# Patient Record
Sex: FEMALE | Race: WHITE | Employment: OTHER | URBAN - METROPOLITAN AREA
[De-identification: names, ages, dates, MRNs, and addresses within clinical notes are randomized per-mention and may not be internally consistent; named-entity substitution may affect disease eponyms.]

---

## 2017-04-10 PROBLEM — Z80.3 FAMILY HISTORY OF BREAST CANCER: Status: ACTIVE | Noted: 2017-04-10

## 2017-09-05 ENCOUNTER — HOSPITAL ENCOUNTER (OUTPATIENT)
Dept: WOMENS IMAGING | Age: 57
Discharge: HOME | End: 2017-09-05
Payer: COMMERCIAL

## 2017-09-05 DIAGNOSIS — Z12.39 SCREENING FOR BREAST CANCER: ICD-10-CM

## 2017-09-05 DIAGNOSIS — Z80.3 FAMILY HISTORY OF BREAST CANCER: ICD-10-CM

## 2017-09-05 PROCEDURE — 77063 BREAST TOMOSYNTHESIS BI: CPT

## 2017-12-06 PROBLEM — H20.9 IRITIS OF BOTH EYES: Status: ACTIVE | Noted: 2017-12-06

## 2018-01-16 ENCOUNTER — ANESTHESIA EVENT (OUTPATIENT)
Dept: OPERATING ROOM | Age: 58
End: 2018-01-16
Payer: COMMERCIAL

## 2018-01-17 ENCOUNTER — HOSPITAL ENCOUNTER (OUTPATIENT)
Age: 58
Setting detail: OUTPATIENT SURGERY
Discharge: HOME HEALTH | End: 2018-01-17
Attending: OPHTHALMOLOGY | Admitting: OPHTHALMOLOGY
Payer: COMMERCIAL

## 2018-01-17 ENCOUNTER — ANESTHESIA (OUTPATIENT)
Dept: OPERATING ROOM | Age: 58
End: 2018-01-17
Payer: COMMERCIAL

## 2018-01-17 VITALS
RESPIRATION RATE: 16 BRPM | WEIGHT: 190 LBS | OXYGEN SATURATION: 100 % | TEMPERATURE: 97.9 F | SYSTOLIC BLOOD PRESSURE: 117 MMHG | HEART RATE: 80 BPM | HEIGHT: 64 IN | BODY MASS INDEX: 32.44 KG/M2 | DIASTOLIC BLOOD PRESSURE: 79 MMHG

## 2018-01-17 VITALS — OXYGEN SATURATION: 98 % | SYSTOLIC BLOOD PRESSURE: 124 MMHG | DIASTOLIC BLOOD PRESSURE: 90 MMHG | TEMPERATURE: 96.8 F

## 2018-01-17 PROBLEM — H20.11: Status: ACTIVE | Noted: 2018-01-17

## 2018-01-17 PROBLEM — H35.371 MACULAR PUCKER, RIGHT EYE: Status: ACTIVE | Noted: 2018-01-17

## 2018-01-17 LAB
EKG ATRIAL RATE: 80 BPM
EKG P AXIS: 33 DEGREES
EKG P-R INTERVAL: 162 MS
EKG Q-T INTERVAL: 408 MS
EKG QRS DURATION: 76 MS
EKG QTC CALCULATION (BAZETT): 470 MS
EKG T AXIS: 2 DEGREES
EKG VENTRICULAR RATE: 80 BPM

## 2018-01-17 PROCEDURE — 6360000002 HC RX W HCPCS: Performed by: ANESTHESIOLOGY

## 2018-01-17 PROCEDURE — 3700000001 HC ADD 15 MINUTES (ANESTHESIA): Performed by: OPHTHALMOLOGY

## 2018-01-17 PROCEDURE — 93005 ELECTROCARDIOGRAM TRACING: CPT

## 2018-01-17 PROCEDURE — 3600000004 HC SURGERY LEVEL 4 BASE: Performed by: OPHTHALMOLOGY

## 2018-01-17 PROCEDURE — 7100000010 HC PHASE II RECOVERY - FIRST 15 MIN: Performed by: OPHTHALMOLOGY

## 2018-01-17 PROCEDURE — 87075 CULTR BACTERIA EXCEPT BLOOD: CPT

## 2018-01-17 PROCEDURE — A6257 TRANSPARENT FILM <= 16 SQ IN: HCPCS | Performed by: OPHTHALMOLOGY

## 2018-01-17 PROCEDURE — 2500000003 HC RX 250 WO HCPCS: Performed by: OPHTHALMOLOGY

## 2018-01-17 PROCEDURE — 3700000000 HC ANESTHESIA ATTENDED CARE: Performed by: OPHTHALMOLOGY

## 2018-01-17 PROCEDURE — 3600000014 HC SURGERY LEVEL 4 ADDTL 15MIN: Performed by: OPHTHALMOLOGY

## 2018-01-17 PROCEDURE — 6360000002 HC RX W HCPCS: Performed by: NURSE ANESTHETIST, CERTIFIED REGISTERED

## 2018-01-17 PROCEDURE — 6360000002 HC RX W HCPCS: Performed by: OPHTHALMOLOGY

## 2018-01-17 PROCEDURE — 6370000000 HC RX 637 (ALT 250 FOR IP): Performed by: OPHTHALMOLOGY

## 2018-01-17 PROCEDURE — 87070 CULTURE OTHR SPECIMN AEROBIC: CPT

## 2018-01-17 PROCEDURE — 2500000003 HC RX 250 WO HCPCS: Performed by: NURSE ANESTHETIST, CERTIFIED REGISTERED

## 2018-01-17 PROCEDURE — 7100000011 HC PHASE II RECOVERY - ADDTL 15 MIN: Performed by: OPHTHALMOLOGY

## 2018-01-17 PROCEDURE — 2580000003 HC RX 258: Performed by: ANESTHESIOLOGY

## 2018-01-17 PROCEDURE — 86403 PARTICLE AGGLUT ANTBDY SCRN: CPT

## 2018-01-17 PROCEDURE — 87205 SMEAR GRAM STAIN: CPT

## 2018-01-17 RX ORDER — SODIUM CHLORIDE, SODIUM LACTATE, POTASSIUM CHLORIDE, CALCIUM CHLORIDE 600; 310; 30; 20 MG/100ML; MG/100ML; MG/100ML; MG/100ML
INJECTION, SOLUTION INTRAVENOUS CONTINUOUS
Status: DISCONTINUED | OUTPATIENT
Start: 2018-01-17 | End: 2018-01-17 | Stop reason: HOSPADM

## 2018-01-17 RX ORDER — MIDAZOLAM HYDROCHLORIDE 1 MG/ML
1 INJECTION INTRAMUSCULAR; INTRAVENOUS EVERY 5 MIN PRN
Status: COMPLETED | OUTPATIENT
Start: 2018-01-17 | End: 2018-01-17

## 2018-01-17 RX ORDER — PHENYLEPHRINE HYDROCHLORIDE 100 MG/ML
1 SOLUTION/ DROPS OPHTHALMIC EVERY 5 MIN PRN
Status: COMPLETED | OUTPATIENT
Start: 2018-01-17 | End: 2018-01-17

## 2018-01-17 RX ORDER — ATROPINE SULFATE 10 MG/ML
1 SOLUTION/ DROPS OPHTHALMIC
Status: COMPLETED | OUTPATIENT
Start: 2018-01-17 | End: 2018-01-17

## 2018-01-17 RX ORDER — CEFAZOLIN SODIUM 500 MG/2.2ML
INJECTION, POWDER, FOR SOLUTION INTRAMUSCULAR; INTRAVENOUS PRN
Status: DISCONTINUED | OUTPATIENT
Start: 2018-01-17 | End: 2018-01-17 | Stop reason: HOSPADM

## 2018-01-17 RX ORDER — BUPIVACAINE HYDROCHLORIDE 7.5 MG/ML
INJECTION, SOLUTION EPIDURAL; RETROBULBAR PRN
Status: DISCONTINUED | OUTPATIENT
Start: 2018-01-17 | End: 2018-01-17 | Stop reason: HOSPADM

## 2018-01-17 RX ORDER — CYCLOPENTOLATE HYDROCHLORIDE 10 MG/ML
1 SOLUTION/ DROPS OPHTHALMIC EVERY 5 MIN PRN
Status: COMPLETED | OUTPATIENT
Start: 2018-01-17 | End: 2018-01-17

## 2018-01-17 RX ORDER — OFLOXACIN 3 MG/ML
1 SOLUTION/ DROPS OPHTHALMIC EVERY 5 MIN PRN
Status: COMPLETED | OUTPATIENT
Start: 2018-01-17 | End: 2018-01-17

## 2018-01-17 RX ORDER — LIDOCAINE HYDROCHLORIDE 10 MG/ML
INJECTION, SOLUTION EPIDURAL; INFILTRATION; INTRACAUDAL; PERINEURAL PRN
Status: DISCONTINUED | OUTPATIENT
Start: 2018-01-17 | End: 2018-01-17 | Stop reason: SDUPTHER

## 2018-01-17 RX ORDER — PROPOFOL 10 MG/ML
INJECTION, EMULSION INTRAVENOUS PRN
Status: DISCONTINUED | OUTPATIENT
Start: 2018-01-17 | End: 2018-01-17 | Stop reason: SDUPTHER

## 2018-01-17 RX ORDER — LIDOCAINE HYDROCHLORIDE 20 MG/ML
INJECTION, SOLUTION INFILTRATION; PERINEURAL PRN
Status: DISCONTINUED | OUTPATIENT
Start: 2018-01-17 | End: 2018-01-17 | Stop reason: HOSPADM

## 2018-01-17 RX ORDER — INDOCYANINE GREEN AND WATER 25 MG
KIT INJECTION PRN
Status: DISCONTINUED | OUTPATIENT
Start: 2018-01-17 | End: 2018-01-17 | Stop reason: HOSPADM

## 2018-01-17 RX ADMIN — PROPOFOL 70 MG: 10 INJECTION, EMULSION INTRAVENOUS at 08:45

## 2018-01-17 RX ADMIN — PHENYLEPHRINE HYDROCHLORIDE 1 DROP: 100 SOLUTION/ DROPS OPHTHALMIC at 07:41

## 2018-01-17 RX ADMIN — OFLOXACIN 1 DROP: 3 SOLUTION OPHTHALMIC at 07:41

## 2018-01-17 RX ADMIN — ATROPINE SULFATE 1 DROP: 10 SOLUTION/ DROPS OPHTHALMIC at 07:56

## 2018-01-17 RX ADMIN — PHENYLEPHRINE HYDROCHLORIDE 1 DROP: 100 SOLUTION/ DROPS OPHTHALMIC at 07:34

## 2018-01-17 RX ADMIN — CYCLOPENTOLATE HYDROCHLORIDE 1 DROP: 10 SOLUTION/ DROPS OPHTHALMIC at 07:47

## 2018-01-17 RX ADMIN — MIDAZOLAM HYDROCHLORIDE 2 MG: 1 INJECTION, SOLUTION INTRAMUSCULAR; INTRAVENOUS at 08:45

## 2018-01-17 RX ADMIN — CYCLOPENTOLATE HYDROCHLORIDE 1 DROP: 10 SOLUTION/ DROPS OPHTHALMIC at 07:41

## 2018-01-17 RX ADMIN — SODIUM CHLORIDE, POTASSIUM CHLORIDE, SODIUM LACTATE AND CALCIUM CHLORIDE: 600; 310; 30; 20 INJECTION, SOLUTION INTRAVENOUS at 07:56

## 2018-01-17 RX ADMIN — CYCLOPENTOLATE HYDROCHLORIDE 1 DROP: 10 SOLUTION/ DROPS OPHTHALMIC at 07:34

## 2018-01-17 RX ADMIN — LIDOCAINE HYDROCHLORIDE 50 MG: 10 INJECTION, SOLUTION EPIDURAL; INFILTRATION; INTRACAUDAL; PERINEURAL at 08:45

## 2018-01-17 RX ADMIN — OFLOXACIN 1 DROP: 3 SOLUTION OPHTHALMIC at 07:35

## 2018-01-17 RX ADMIN — MIDAZOLAM HYDROCHLORIDE 1 MG: 1 INJECTION, SOLUTION INTRAMUSCULAR; INTRAVENOUS at 08:25

## 2018-01-17 RX ADMIN — OFLOXACIN 1 DROP: 3 SOLUTION OPHTHALMIC at 07:47

## 2018-01-17 RX ADMIN — PHENYLEPHRINE HYDROCHLORIDE 1 DROP: 100 SOLUTION/ DROPS OPHTHALMIC at 07:47

## 2018-01-17 RX ADMIN — GENTAMICIN, PREDNISOLONE ACETATE 1 DROP: 3; 10 SUSPENSION/ DROPS OPHTHALMIC at 07:56

## 2018-01-17 ASSESSMENT — PULMONARY FUNCTION TESTS
PIF_VALUE: 0
PIF_VALUE: 1
PIF_VALUE: 0
PIF_VALUE: 1
PIF_VALUE: 0

## 2018-01-17 ASSESSMENT — PAIN SCALES - GENERAL
PAINLEVEL_OUTOF10: 0

## 2018-01-17 ASSESSMENT — PAIN DESCRIPTION - ORIENTATION: ORIENTATION: RIGHT

## 2018-01-17 ASSESSMENT — ENCOUNTER SYMPTOMS
STRIDOR: 0
SHORTNESS OF BREATH: 0

## 2018-01-17 ASSESSMENT — PAIN DESCRIPTION - PAIN TYPE: TYPE: SURGICAL PAIN

## 2018-01-17 ASSESSMENT — PAIN DESCRIPTION - LOCATION: LOCATION: EYE

## 2018-01-17 ASSESSMENT — PAIN - FUNCTIONAL ASSESSMENT
PAIN_FUNCTIONAL_ASSESSMENT: 0-10
PAIN_FUNCTIONAL_ASSESSMENT: 0-10

## 2018-01-17 NOTE — H&P
History and Physical    Pt Name: Devendra Dubon  MRN: [id number]  YOB: 1960  Date of evaluation: 1/17/2018  Primary Care Physician: Swapna Gooden MD    SUBJECTIVE:   History of Chief Complaint:    Devendra Dubon is a 61 y.o. female who is scheduled for right vitrectomy. Pt says she is having this done to remove \"an extra membrane\" . She says she sees shadows around things and blurry for the past couple of months. She has had prior cataract removal as well as YAG procedure bilaterally. Allergies  is allergic to pneumococcal vaccines; protonix [pantoprazole]; and sulfa antibiotics. Medications  Prior to Admission medications    Medication Sig Start Date End Date Taking? Authorizing Provider   fluconazole (DIFLUCAN) 150 MG tablet TAKE ONE TABLET NOW AND ONE IN 72 HOURS. 1/15/18  Yes BABAR Yeager   Cetirizine HCl (ZYRTEC PO) Take 1 tablet by mouth nightly   Yes Historical Provider, MD   etodolac (LODINE) 400 MG tablet TAKE 1 TABLET TWICE A DAY 10/5/17  Yes Swapna Gooden MD   montelukast (SINGULAIR) 10 MG tablet TAKE 1 TABLET AT BEDTIME 8/9/17  Yes Swapna Gooden MD   albuterol sulfate HFA (PROAIR HFA) 108 (90 BASE) MCG/ACT inhaler Inhale 2 puffs into the lungs every 6 hours as needed for Wheezing 4/4/17  Yes Sp Fuentes PA-C   omeprazole (PRILOSEC) 20 MG delayed release capsule Take 1 capsule by mouth 2 times daily 12/14/16  Yes Sp Fuentes PA-C   metroNIDAZOLE (METROCREAM) 0.75 % cream Apply topically 2 times daily. Patient taking differently: Apply topically daily Apply topically 2 times daily.  3/16/16  Yes Swapna Gooden MD   Ascorbic Acid (VITAMIN C) 1000 MG tablet Take 1,000 mg by mouth 2 times daily   Yes Historical Provider, MD   zolpidem (AMBIEN) 10 MG tablet Take by mouth nightly as needed for Sleep (1/2 a pill as needed for sleep)   Yes Historical Provider, MD   aspirin 325 MG tablet Take 325 mg by mouth daily   Yes Historical Provider, MD   Cholecalciferol (VITAMIN D3) 2000 UNITS CAPS Take 1 capsule by mouth daily    Yes Historical Provider, MD   Omega-3 Fatty Acids (FISH OIL) 1000 MG CAPS Take 1,000 mg by mouth 2 times daily   Yes Historical Provider, MD   b complex vitamins capsule Take 1 capsule by mouth daily   Yes Historical Provider, MD   fluticasone (FLONASE) 50 MCG/ACT nasal spray 1 spray by Nasal route daily  Patient taking differently: 1 spray by Nasal route daily as needed  4/4/17   Jaya Thayer PA-C     Past Medical History    has a past medical history of Arthritis; Decreased vision of right eye; Dental bridge present; Deviated septum; Dry skin; GERD (gastroesophageal reflux disease); History of chest pain; Sinus infection; Sleep apnea; Snores; Tingling; and Urgency of urination. Past Surgical History   has a past surgical history that includes knee surgery (Right, 2013); Cataract removal (Bilateral); [place] tooth extraction; eye surgery; e-malignant / benign skin lesion excision; and Colonoscopy. Social History   reports that she has never smoked. She has never used smokeless tobacco.   reports that she drinks alcohol. reports that she does not use drugs. 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 OBJECTIVE:   VITALS:  height is 5' 4\" (1.626 m) and weight is 190 lb (86.2 kg). per [address] & orientated x 3, no acute distress. Friendly. SKIN:  Warm and dry, no rashes   HEAD:  Normocephalic, atraumatic   EYES: PERRLA. EOMI    EARS:  Hearing grossly WNL. NOSE:  Nares patent. No rhinorrhea   MOUTH/THROAT:  Benign  NECK:supple, no lymphadenopathy  LUNGS: Respirations even and non-labored. Clear to auscultation bilaterally, no wheezes, rales, or rhonchi.     CARDIOVASCULAR:

## 2018-01-17 NOTE — ANESTHESIA PRE PROCEDURE
fluticasone (FLONASE) 50 MCG/ACT nasal spray 1 spray by Nasal route daily  Patient taking differently: 1 spray by Nasal route daily as needed  4/4/17   Idalmis Mcguire PA-C       Current medications:    Current Facility-Administered Medications   Medication Dose Route Frequency Provider Last Rate Last Dose    lactated ringers infusion   Intravenous Continuous Sebastian Payan  mL/hr at 01/17/18 0756         Allergies: Allergies   Allergen Reactions    Pneumococcal Vaccines Itching     Itchy, red eyes.      Protonix [Pantoprazole] Hives and Itching    Sulfa Antibiotics Itching       Problem List:    Patient Active Problem List   Diagnosis Code    Abdominal tenderness, epigastric R10.816    Abnormal weight gain R63.5    Allergic rhinitis J30.9    Autonomic nervous system disorder G90.9    Compound nevus D22.9    Contact dermatitis L25.9    Hyperlipidemia E78.5    Insomnia G47.00    Limb pain M79.609    Migraine headache G43.909    Myalgia and myositis [id number]    Neoplasm of uncertain behavior of skin D48.5    Perioral dermatitis L71.0    Polyarthritis M13.0    Shoulder pain, right M25.511    Tension type headache G44.209    Visit for screening mammogram Z12.31    Obstructive sleep apnea syndrome G47.33    Family history of breast cancer Z80.3    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       Past Medical History:        Diagnosis Date    Arthritis     Decreased vision of right eye     Dental bridge present     permanent left upper    Deviated septum     Dry skin     GERD (gastroesophageal reflux disease)     History of chest pain     past,stress test negative,poss acid reflux    Sinus infection 01/04/2018    on antibiotics x 10 days,better no cough or fever    Sleep apnea     mild no machine    Snores     Tingling     Urgency of urination     occas       Past Surgical History:        Procedure Laterality Date    CATARACT REMOVAL Bilateral     COLONOSCOPY      EYE SURGERY      EYE SURGERY Bilateral     YAG- right x3, left x2    KNEE SURGERY Right 2013    orif patella    PRE-MALIGNANT / BENIGN SKIN LESION EXCISION      x2    WISDOM TOOTH EXTRACTION         Social History:    Social History   Substance Use Topics    Smoking status: Never Smoker    Smokeless tobacco: Never Used    Alcohol use 0.0 oz/week                                Counseling given: Not Answered      Vital Signs (Current):   Vitals:    01/12/18 0943 01/17/18 0712 01/17/18 0736   BP:   (!) 130/98   Pulse:   83   Resp:   20   Temp:   98.4 °F (36.9 °C)   TempSrc:   Temporal   SpO2:   97%   Weight: 190 lb (86.2 kg) 190 lb (86.2 kg) 190 lb (86.2 kg)   Height: 5' 4\" (1.626 m) 5' 4\" (1.626 m) 5' 4\" (1.626 m)                                              BP Readings from Last 3 Encounters:   01/17/18 (!) 130/98   12/06/17 126/76   04/04/17 120/78       NPO Status: Time of last liquid consumption: 2330                        Time of last solid consumption: 2030                        Date of last liquid consumption: 01/16/18                        Date of last solid food consumption: 01/16/18    BMI:   Wt Readings from Last 3 Encounters:   01/17/18 190 lb (86.2 kg)   12/06/17 192 lb 3.2 oz (87.2 kg)   04/04/17 181 lb 12.8 oz (82.5 kg)     Body mass index is 32.61 kg/m². CBC: No results found for: WBC, RBC, HGB, HCT, MCV, RDW, PLT    CMP: No results found for: NA, K, CL, CO2, BUN, CREATININE, GFRAA, AGRATIO, LABGLOM, GLUCOSE, PROT, CALCIUM, BILITOT, ALKPHOS, AST, ALT    POC Tests: No results for input(s): POCGLU, POCNA, POCK, POCCL, POCBUN, POCHEMO, POCHCT in the last 72 hours.     Coags: No results found for: PROTIME, INR, APTT    HCG (If Applicable): No results found for: PREGTESTUR, PREGSERUM, HCG, HCGQUANT     ABGs: No results found for: PHART, PO2ART, PMP4VJI, JMH7YAY, BEART, [id number]     Type & Screen (If Applicable):  No results found for: LABABO, LABRH    Anesthesia Evaluation   no history of anesthetic complications:

## 2018-01-18 NOTE — OP NOTE
complications. A complete vitrectomy was then carried out to  limits of visualization under wide-field biomicroscopy. ICG dye was  introduced and removed immediately in its entirety to facilitate staining  of the internal limiting membrane. Under high magnification, a rent was  created in the ILM, and the ILM was peeled in its entirety 360 degrees  around the macula with all overlying epiretinal tissue with 23-gauge  membrane peeling forceps without complications. The eye was examined with  360 degrees of scleral depression with no evidence of new retinal tear or  detachment. The infusion was then turned off, and vancomycin 1 mg/0.1 mL  for a total volume of 0.1 mL was introduced with the 30-gauge needle  through the anterior chamber with an attempt being made to bathe the  capsular remnants and the intraocular lens implant and also achieve  intravitreal delivery. The trocars were removed. The wounds were examined  meticulously for leaks, none were noted. The eye was palpated and noted to  be within normal pressure range. Ancef was instilled. Lid speculum was  removed. The eye was cleaned. Drops and ointments were instilled. The  eye was patched and shielded. SPECIMENS:  1. Right eye undiluted vitreous biopsy. 2.  Right eye undiluted AC aqueous tap biopsy. 3.  Right eye capsular biopsy. DISPOSITION:  The patient was transferred to the PACU without  complications. She was given instructions, prescriptions of medications,  and a followup the next day with [facility]JEREMY.     Carrie Negrete    D: 01/17/2018 9:27:41       T: 01/17/2018 13:39:31     [email or web address]  Job#: [id number]     Doc#: [id number]    CC:

## 2018-01-22 LAB
CULTURE: NORMAL
DIRECT EXAM: NORMAL
Lab: NORMAL
Lab: NORMAL
SPECIMEN DESCRIPTION: NORMAL
SPECIMEN DESCRIPTION: NORMAL
STATUS: NORMAL
STATUS: NORMAL

## 2018-01-23 LAB
CULTURE: ABNORMAL
DIRECT EXAM: ABNORMAL
DIRECT EXAM: ABNORMAL
Lab: ABNORMAL
SPECIMEN DESCRIPTION: ABNORMAL
STATUS: ABNORMAL

## 2018-02-13 ENCOUNTER — HOSPITAL ENCOUNTER (OUTPATIENT)
Age: 58
Setting detail: SPECIMEN
Discharge: HOME | End: 2018-02-13
Payer: COMMERCIAL

## 2018-02-15 LAB — SURGICAL PATHOLOGY REPORT: NORMAL

## 2018-10-03 PROBLEM — N89.8 VAGINAL ITCHING: Status: ACTIVE | Noted: 2018-10-03

## 2018-11-17 PROBLEM — M26.609 TMJ (TEMPOROMANDIBULAR JOINT DISORDER): Status: ACTIVE | Noted: 2018-11-17

## 2019-02-04 ENCOUNTER — OFFICE VISIT (OUTPATIENT)
Dept: PRIMARY CARE CLINIC | Age: 59
End: 2019-02-04
Payer: COMMERCIAL

## 2019-02-04 VITALS
HEART RATE: 102 BPM | OXYGEN SATURATION: 98 % | WEIGHT: 190.2 LBS | BODY MASS INDEX: 32.65 KG/M2 | DIASTOLIC BLOOD PRESSURE: 86 MMHG | SYSTOLIC BLOOD PRESSURE: 132 MMHG

## 2019-02-04 DIAGNOSIS — Z12.39 SCREENING FOR BREAST CANCER: ICD-10-CM

## 2019-02-04 DIAGNOSIS — R20.2 PARESTHESIA OF RIGHT ARM: ICD-10-CM

## 2019-02-04 DIAGNOSIS — Z23 NEED FOR VACCINATION: ICD-10-CM

## 2019-02-04 DIAGNOSIS — Z80.3 FAMILY HISTORY OF BREAST CANCER IN MOTHER: ICD-10-CM

## 2019-02-04 DIAGNOSIS — M25.511 ACUTE PAIN OF RIGHT SHOULDER: Primary | ICD-10-CM

## 2019-02-04 PROCEDURE — 90688 IIV4 VACCINE SPLT 0.5 ML IM: CPT | Performed by: PHYSICIAN ASSISTANT

## 2019-02-04 PROCEDURE — 90471 IMMUNIZATION ADMIN: CPT | Performed by: PHYSICIAN ASSISTANT

## 2019-02-04 PROCEDURE — 99213 OFFICE O/P EST LOW 20 MIN: CPT | Performed by: PHYSICIAN ASSISTANT

## 2019-02-04 RX ORDER — CYCLOBENZAPRINE HCL 10 MG
10 TABLET ORAL NIGHTLY PRN
Qty: 30 TABLET | Refills: 0 | Status: SHIPPED | OUTPATIENT
Start: 2019-02-04 | End: 2019-02-14

## 2019-02-04 RX ORDER — ETODOLAC 400 MG/1
TABLET, FILM COATED ORAL
Qty: 180 TABLET | Refills: 1 | Status: SHIPPED | OUTPATIENT
Start: 2019-02-04 | End: 2019-08-03 | Stop reason: SDUPTHER

## 2019-02-04 RX ORDER — MONTELUKAST SODIUM 10 MG/1
10 TABLET ORAL NIGHTLY
COMMUNITY
End: 2020-04-06 | Stop reason: SDUPTHER

## 2019-02-04 RX ORDER — PREDNISONE 20 MG/1
40 TABLET ORAL DAILY
Qty: 10 TABLET | Refills: 0 | Status: SHIPPED | OUTPATIENT
Start: 2019-02-04 | End: 2019-02-09

## 2019-02-08 ENCOUNTER — HOSPITAL ENCOUNTER (OUTPATIENT)
Dept: GENERAL RADIOLOGY | Age: 59
Discharge: HOME | End: 2019-02-10
Payer: COMMERCIAL

## 2019-02-08 ENCOUNTER — HOSPITAL ENCOUNTER (OUTPATIENT)
Dept: MAMMOGRAPHY | Age: 59
Discharge: HOME | End: 2019-02-10
Payer: COMMERCIAL

## 2019-02-08 DIAGNOSIS — Z12.39 SCREENING FOR BREAST CANCER: ICD-10-CM

## 2019-02-08 DIAGNOSIS — M54.10 RADICULOPATHY OF ARM: ICD-10-CM

## 2019-02-08 DIAGNOSIS — M25.511 ACUTE PAIN OF RIGHT SHOULDER: ICD-10-CM

## 2019-02-08 DIAGNOSIS — Z80.3 FAMILY HISTORY OF BREAST CANCER IN MOTHER: ICD-10-CM

## 2019-02-08 PROCEDURE — 77067 SCR MAMMO BI INCL CAD: CPT

## 2019-02-08 PROCEDURE — 73030 X-RAY EXAM OF SHOULDER: CPT

## 2019-05-20 ENCOUNTER — OFFICE VISIT (OUTPATIENT)
Dept: PRIMARY CARE CLINIC | Age: 59
End: 2019-05-20
Payer: COMMERCIAL

## 2019-05-20 VITALS
SYSTOLIC BLOOD PRESSURE: 136 MMHG | WEIGHT: 190.2 LBS | OXYGEN SATURATION: 98 % | HEART RATE: 94 BPM | DIASTOLIC BLOOD PRESSURE: 84 MMHG | BODY MASS INDEX: 32.65 KG/M2

## 2019-05-20 DIAGNOSIS — M79.632 LEFT FOREARM PAIN: Primary | ICD-10-CM

## 2019-05-20 DIAGNOSIS — M25.542 METACARPOPHALANGEAL JOINT PAIN OF LEFT HAND: ICD-10-CM

## 2019-05-20 PROCEDURE — 99213 OFFICE O/P EST LOW 20 MIN: CPT | Performed by: PHYSICIAN ASSISTANT

## 2019-05-20 RX ORDER — CETIRIZINE HYDROCHLORIDE 10 MG/1
10 TABLET ORAL DAILY
COMMUNITY
End: 2022-04-22 | Stop reason: ALTCHOICE

## 2019-05-20 RX ORDER — SODIUM FLUORIDE 6 MG/ML
PASTE, DENTIFRICE DENTAL
COMMUNITY
Start: 2019-05-18 | End: 2021-06-08

## 2019-05-20 ASSESSMENT — PATIENT HEALTH QUESTIONNAIRE - PHQ9
1. LITTLE INTEREST OR PLEASURE IN DOING THINGS: 0
SUM OF ALL RESPONSES TO PHQ9 QUESTIONS 1 & 2: 0
SUM OF ALL RESPONSES TO PHQ QUESTIONS 1-9: 0
SUM OF ALL RESPONSES TO PHQ QUESTIONS 1-9: 0
2. FEELING DOWN, DEPRESSED OR HOPELESS: 0

## 2019-05-20 NOTE — PROGRESS NOTES
[facility]  [address] [id number] [facility]  Dept: Melyssa Dupont is a 60 y.o. female who presents today for her medical conditions/complaints as noted below. Chief Complaint   Patient presents with    Joint Swelling     Pt states lt painful wrist with knuckle swelling. HPI:     HPI   Shoulder pain improved with PT. Right hand dominant. Pain in dorsal left forearm for a couple months. Says it is constant; not worse with movement. Slightly better over the last few weeks. No known injury. Can't think of a repetitive motion she does with her left hand- hasn't done hooking lately. 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 Pulled a lot of weeds and put out mulch about 2 weeks ago. Left Middle finger MCP joint swelling since then- hurts to pull up pants and use hands to push self up. Aches like toothache all the time. Decreased ROM. No n/t in the hand. Was warm previously. Used ice. No personal hx of gout. Rare alcohol. Not much red meat. No personal or family hx of RA. Chronic thumb pain. Taking Lodine daily. Also taking Tylenol almost daily bid the last couple weeks, which also helps.          LDL Calculated (mg/dL)   Date Value   12/07/2017 138       (goal LDL is <100)   No results found for: AST, ALT, BUN  BP Readings from Last 3 Encounters:   05/20/19 136/84   02/04/19 132/86   11/07/18 (!) 132/90          (goal 120/80)    Past Medical History:   Diagnosis Date    Arthritis     Decreased vision of right eye     Dental bridge present     permanent left upper    Deviated septum     Dry skin     GERD (gastroesophageal reflux disease)     History of chest pain     past,stress test negative,poss acid reflux    Sinus infection 01/04/2018    on antibiotics x 10 days,better no cough or fever    Sleep apnea     mild no machine    Snores     Tingling     Urgency of urination     occas Past Surgical History:   Procedure Laterality Date    CATARACT REMOVAL Bilateral     COLONOSCOPY      EYE SURGERY      EYE SURGERY Bilateral     YAG- right x3, left x2    KNEE SURGERY Right 2013    orif patella    [place] RELEAS VITREOUS,SUBRET/CHOROID FLUID Right 1/17/2018    VITRECTOMY 23 GAUGE, membrane peel performed by Maxim Aviles MD at [facility] PRE-MALIG[facility]NT / [address]      x2    VITRECTOMY Right 01/17/2018    WISDOM TOOTH EXTRACTION         Family History   Problem Relation Age of Onset    Cancer Mother         breast    Heart Disease Father     Diabetes Father        Social History     Tobacco Use    Smoking status: Never Smoker    Smokeless tobacco: Never Used   Substance Use Topics    Alcohol use: Yes     Alcohol/week: 0.0 oz      Current Outpatient Medications   Medication Sig Dispense Refill    cetirizine (ZYRTEC) 10 MG tablet Take 10 mg by mouth daily      Elastic Bandages & Supports (WRIST SPLINT) MISC Wear on left wrist daily for 2 weeks 1 each 0    montelukast (SINGULAIR) 10 MG tablet Take 10 mg by mouth nightly      etodolac (LODINE) 400 MG tablet TAKE 1 TABLET TWICE A  tablet 1    Ascorbic Acid (VITAMIN C) 1000 MG tablet Take 1,000 mg by mouth 2 times daily      zolpidem (AMBIEN) 10 MG tablet Take by mouth nightly as needed for Sleep (1/2 a pill as needed for sleep)      aspirin 325 MG tablet Take 325 mg by mouth daily      Omega-3 Fatty Acids (FISH OIL) 1000 MG CAPS Take 1,000 mg by mouth 2 times daily      b complex vitamins capsule Take 1 capsule by mouth daily      PREVIDENT 5000 BOOSTER PLUS 1.1 % PSTE        No current facility-administered medications for this visit. Allergies   Allergen Reactions    Pneumococcal Vaccines Itching     Itchy, red eyes.      Protonix [Pantoprazole] Hives and Itching    Sulfa Antibiotics Itching       Health Maintenance   Topic Date Due    HIV screen  03/20/1974    Shingles Vaccine (1 of 2) 03/20/2009    Diabetes screen  12/07/2020    Breast cancer screen  02/08/2021    Cervical cancer screen  02/22/2021    Lipid screen  12/07/2022    DTaP/Tdap/Td vaccine (3 - Td) 01/13/2023    Colon cancer screen colonoscopy  02/13/2028    Flu vaccine  Completed    Hepatitis C screen  Completed       Subjective:      Review of Systems   Musculoskeletal: Positive for arthralgias (left middle finger MCP joint), joint swelling and myalgias (left forearm). Neurological: Negative for numbness. Objective:     /84   Pulse 94   Wt 190 lb 3.2 oz (86.3 kg)   SpO2 98%   BMI 32.65 kg/m²   Physical Exam   Constitutional: She appears well-developed and well-nourished. No distress. HENT:   Head: Normocephalic and atraumatic. Cardiovascular: Normal rate, regular rhythm and normal heart sounds. Pulmonary/Chest: Effort normal and breath sounds normal.   Musculoskeletal:        Left wrist: She exhibits normal range of motion, no tenderness and no bony tenderness. Arms:       Left hand: She exhibits decreased range of motion (normal flexion, decreased extension of middle finger). She exhibits no tenderness, no bony tenderness, normal capillary refill and no swelling. Decreased strength with middle finger extension    Skin: She is not diaphoretic. Nursing note and vitals reviewed. Assessment:       Diagnosis Orders   1. Left forearm pain  XR RADIUS ULNA LEFT (2 VIEWS)    Elastic Bandages & Supports (WRIST SPLINT) MISC   2. Metacarpophalangeal joint pain of left hand          Plan:    1. Forearm pain- Xray since this has been going on for months. Try neutral wrist splint for 2 weeks to see if it is a tendonitis. 2. Left middle finger MCP joint pain- no redness, warmth, or swelling today. Did not order xray yet since she has no tenderness, but would do xray if it persists. Continue Lodine/Tylenol for pain. Return if symptoms worsen or fail to improve.     Orders Placed This Encounter   Procedures    XR RADIUS ULNA LEFT (2 VIEWS)     Standing Status:   Future     Standing Expiration Date:   5/20/2020     Order Specific Question:   Reason for exam:     Answer:   left dorsal distal forearm pain for about 3 months     Orders Placed This Encounter   Medications    Elastic Bandages & Supports (WRIST SPLINT) MISC     Sig: Wear on left wrist daily for 2 weeks     Dispense:  1 each     Refill:  0       Patient given educationalmaterials - see patient instructions. Discussed use, benefit, and side effectsof prescribed medications. All patient questions answered. Pt voiced understanding. Reviewed health maintenance. Instructed to continue current medications, diet andexercise. Patient agreed with treatment plan. Follow up as directed.      Electronicallysigned by Dennys Herrera PA-C on 5/26/2019 at 8:17 AM

## 2019-07-11 ENCOUNTER — HOSPITAL ENCOUNTER (OUTPATIENT)
Dept: GENERAL RADIOLOGY | Age: 59
Discharge: HOME | End: 2019-07-13
Payer: COMMERCIAL

## 2019-07-11 ENCOUNTER — HOSPITAL ENCOUNTER (OUTPATIENT)
Age: 59
Discharge: HOME | End: 2019-07-13
Payer: COMMERCIAL

## 2019-07-11 DIAGNOSIS — M79.632 LEFT FOREARM PAIN: ICD-10-CM

## 2019-07-11 PROCEDURE — 73090 X-RAY EXAM OF FOREARM: CPT

## 2019-08-03 DIAGNOSIS — R20.2 PARESTHESIA OF RIGHT ARM: ICD-10-CM

## 2019-08-03 DIAGNOSIS — M25.511 ACUTE PAIN OF RIGHT SHOULDER: ICD-10-CM

## 2019-08-05 RX ORDER — ETODOLAC 400 MG/1
TABLET, FILM COATED ORAL
Qty: 180 TABLET | Refills: 1 | Status: SHIPPED | OUTPATIENT
Start: 2019-08-05 | End: 2021-06-08

## 2019-08-23 ENCOUNTER — OFFICE VISIT (OUTPATIENT)
Dept: PRIMARY CARE CLINIC | Age: 59
End: 2019-08-23
Payer: COMMERCIAL

## 2019-08-23 ENCOUNTER — HOSPITAL ENCOUNTER (OUTPATIENT)
Age: 59
Discharge: HOME | End: 2019-08-23
Payer: COMMERCIAL

## 2019-08-23 VITALS
BODY MASS INDEX: 32.66 KG/M2 | HEART RATE: 103 BPM | RESPIRATION RATE: 16 BRPM | WEIGHT: 184.3 LBS | DIASTOLIC BLOOD PRESSURE: 88 MMHG | SYSTOLIC BLOOD PRESSURE: 122 MMHG | HEIGHT: 63 IN | OXYGEN SATURATION: 98 %

## 2019-08-23 DIAGNOSIS — R10.11 RUQ PAIN: Primary | ICD-10-CM

## 2019-08-23 DIAGNOSIS — R11.2 NON-INTRACTABLE VOMITING WITH NAUSEA, UNSPECIFIED VOMITING TYPE: ICD-10-CM

## 2019-08-23 DIAGNOSIS — R10.12 ACUTE LUQ PAIN: ICD-10-CM

## 2019-08-23 DIAGNOSIS — R10.11 RUQ PAIN: ICD-10-CM

## 2019-08-23 LAB
ABSOLUTE EOS #: 0.3 K/UL (ref 0–0.44)
ABSOLUTE IMMATURE GRANULOCYTE: <0.03 K/UL (ref 0–0.3)
ABSOLUTE LYMPH #: 2.61 K/UL (ref 1.1–3.7)
ABSOLUTE MONO #: 0.46 K/UL (ref 0.1–1.2)
ALBUMIN SERPL-MCNC: 4.5 G/DL (ref 3.5–5.2)
ALBUMIN/GLOBULIN RATIO: 1.6 (ref 1–2.5)
ALP BLD-CCNC: 65 U/L (ref 35–104)
ALT SERPL-CCNC: 26 U/L (ref 5–33)
AMYLASE: 39 U/L (ref 28–100)
ANION GAP SERPL CALCULATED.3IONS-SCNC: 14 MMOL/L (ref 9–17)
AST SERPL-CCNC: 20 U/L
BASOPHILS # BLD: 1 % (ref 0–2)
BASOPHILS ABSOLUTE: 0.06 K/UL (ref 0–0.2)
BILIRUB SERPL-MCNC: 0.38 MG/DL (ref 0.3–1.2)
BILIRUBIN DIRECT: 0.11 MG/DL
BILIRUBIN, INDIRECT: 0.27 MG/DL (ref 0–1)
BUN BLDV-MCNC: 15 MG/DL (ref 8–23)
BUN/CREAT BLD: NORMAL (ref 9–20)
CALCIUM SERPL-MCNC: 9.5 MG/DL (ref 8.6–10.4)
CHLORIDE BLD-SCNC: 101 MMOL/L (ref 98–107)
CO2: 27 MMOL/L (ref 20–31)
CREAT SERPL-MCNC: 0.73 MG/DL (ref 0.5–0.9)
DIFFERENTIAL TYPE: ABNORMAL
EOSINOPHILS RELATIVE PERCENT: 4 % (ref 1–4)
GFR AFRICAN AMERICAN: >60 ML/MIN
GFR NON-AFRICAN AMERICAN: >60 ML/MIN
GFR SERPL CREATININE-BSD FRML MDRD: NORMAL ML/MIN/{1.73_M2}
GFR SERPL CREATININE-BSD FRML MDRD: NORMAL ML/MIN/{1.73_M2}
GLOBULIN: NORMAL G/DL (ref 1.5–3.8)
GLUCOSE BLD-MCNC: 84 MG/DL (ref 70–99)
HCT VFR BLD CALC: 42.9 % (ref 36.3–47.1)
HEMOGLOBIN: 13.5 G/DL (ref 11.9–15.1)
IMMATURE GRANULOCYTES: 0 %
LIPASE: 21 U/L (ref 13–60)
LYMPHOCYTES # BLD: 38 % (ref 24–43)
MCH RBC QN AUTO: 28.1 PG (ref 25.2–33.5)
MCHC RBC AUTO-ENTMCNC: 31.5 G/DL (ref 28.4–34.8)
MCV RBC AUTO: 89.2 FL (ref 82.6–102.9)
MONOCYTES # BLD: 7 % (ref 3–12)
NRBC AUTOMATED: 0 PER 100 WBC
PDW BLD-RTO: 14.6 % (ref 11.8–14.4)
PLATELET # BLD: 342 K/UL (ref 138–453)
PLATELET ESTIMATE: ABNORMAL
PMV BLD AUTO: 10 FL (ref 8.1–13.5)
POTASSIUM SERPL-SCNC: 4.2 MMOL/L (ref 3.7–5.3)
RBC # BLD: 4.81 M/UL (ref 3.95–5.11)
RBC # BLD: ABNORMAL 10*6/UL
SEG NEUTROPHILS: 50 % (ref 36–65)
SEGMENTED NEUTROPHILS ABSOLUTE COUNT: 3.36 K/UL (ref 1.5–8.1)
SODIUM BLD-SCNC: 142 MMOL/L (ref 135–144)
TOTAL PROTEIN: 7.4 G/DL (ref 6.4–8.3)
WBC # BLD: 6.8 K/UL (ref 3.5–11.3)
WBC # BLD: ABNORMAL 10*3/UL

## 2019-08-23 PROCEDURE — 36415 COLL VENOUS BLD VENIPUNCTURE: CPT

## 2019-08-23 PROCEDURE — 85025 COMPLETE CBC W/AUTO DIFF WBC: CPT

## 2019-08-23 PROCEDURE — 80048 BASIC METABOLIC PNL TOTAL CA: CPT

## 2019-08-23 PROCEDURE — 80076 HEPATIC FUNCTION PANEL: CPT

## 2019-08-23 PROCEDURE — 83690 ASSAY OF LIPASE: CPT

## 2019-08-23 PROCEDURE — 99213 OFFICE O/P EST LOW 20 MIN: CPT | Performed by: FAMILY MEDICINE

## 2019-08-23 PROCEDURE — 82150 ASSAY OF AMYLASE: CPT

## 2019-08-23 ASSESSMENT — ENCOUNTER SYMPTOMS
SORE THROAT: 0
RHINORRHEA: 0
VOMITING: 1
COUGH: 0
SHORTNESS OF BREATH: 0
WHEEZING: 0
DIARRHEA: 0
EYE REDNESS: 0
ABDOMINAL PAIN: 1
BLOOD IN STOOL: 0
EYE DISCHARGE: 0
NAUSEA: 1

## 2019-08-23 NOTE — PROGRESS NOTES
[address]  [address]  LOWER LEVEL [place]  [address] [address]  Dept: [phone]    Latosha Erickson is a 60 y.o. female who presents today for her medical conditions/complaintsas noted below. Chief Complaint   Patient presents with    Other     Bowel issues       HPI:     HPI  Patient states has been having recurrent vomiting approximately 1 hour after eating. States occurring about twice a month. Normally her bowel movements are on a very regular basis. He has had no melena or hematochezia. States has been having some dry mouth. Denies any change in weight. No mucus in the stool. Otherwise unremarkable.     LDL Calculated (mg/dL)   Date Value   12/07/2017 138       (goal LDL is <100)   No results found for: AST, ALT, BUN  BP Readings from Last 3 Encounters:   08/23/19 122/88   05/20/19 136/84   02/04/19 132/86          (goal 120/80)    Past Medical History:   Diagnosis Date    Arthritis     Decreased vision of right eye     Dental bridge present     permanent left upper    Deviated septum     Dry skin     GERD (gastroesophageal reflux disease)     History of chest pain     past,stress test negative,poss acid reflux    Sinus infection 01/04/2018    on antibiotics x 10 days,better no cough or fever    Sleep apnea     mild no machine    Snores     Tingling     Urgency of urination     occas      Past Surgical History:   Procedure Laterality Date    CATARACT REMOVAL Bilateral     COLONOSCOPY      EYE SURGERY      EYE SURGERY Bilateral     YAG- right x3, left x2    KNEE SURGERY Right 2013    orif patella    [place] RELEAS VITREOUS,SUBRET/CHOROID FLUID Right 1/17/2018    VITRECTOMY 23 GAUGE, membrane peel performed by Niecy Clarke MD at [address] PRE-MALIGNANT / [address]      x2    VITRECTOMY Right 01/17/2018    WISDOM TOOTH EXTRACTION         Family History   Problem Relation Age of Onset    Cancer Mother

## 2019-08-27 DIAGNOSIS — R10.12 ACUTE LUQ PAIN: ICD-10-CM

## 2019-08-27 DIAGNOSIS — R11.2 NON-INTRACTABLE VOMITING WITH NAUSEA, UNSPECIFIED VOMITING TYPE: ICD-10-CM

## 2019-08-27 DIAGNOSIS — R10.11 RUQ PAIN: ICD-10-CM

## 2019-09-13 DIAGNOSIS — R10.816 EPIGASTRIC ABDOMINAL TENDERNESS, REBOUND TENDERNESS PRESENCE NOT SPECIFIED: Primary | ICD-10-CM

## 2019-09-18 ENCOUNTER — OFFICE VISIT (OUTPATIENT)
Dept: PRIMARY CARE CLINIC | Age: 59
End: 2019-09-18
Payer: COMMERCIAL

## 2019-09-18 VITALS
OXYGEN SATURATION: 97 % | DIASTOLIC BLOOD PRESSURE: 82 MMHG | BODY MASS INDEX: 31.72 KG/M2 | HEART RATE: 98 BPM | WEIGHT: 180.8 LBS | SYSTOLIC BLOOD PRESSURE: 132 MMHG

## 2019-09-18 DIAGNOSIS — J40 BRONCHITIS: Primary | ICD-10-CM

## 2019-09-18 PROCEDURE — 99213 OFFICE O/P EST LOW 20 MIN: CPT | Performed by: FAMILY MEDICINE

## 2019-09-18 RX ORDER — FLUCONAZOLE 150 MG/1
150 TABLET ORAL ONCE
Qty: 1 TABLET | Refills: 0 | Status: SHIPPED | OUTPATIENT
Start: 2019-09-18 | End: 2019-09-18

## 2019-09-18 RX ORDER — AZITHROMYCIN 250 MG/1
250 TABLET, FILM COATED ORAL SEE ADMIN INSTRUCTIONS
Qty: 6 TABLET | Refills: 0 | Status: SHIPPED | OUTPATIENT
Start: 2019-09-18 | End: 2019-09-23

## 2019-09-18 RX ORDER — GUAIFENESIN AND PSEUDOEPHEDRINE HCL 1200; 120 MG/1; MG/1
1 TABLET, EXTENDED RELEASE ORAL 2 TIMES DAILY
Qty: 20 TABLET | Refills: 0 | Status: SHIPPED | OUTPATIENT
Start: 2019-09-18 | End: 2019-10-01 | Stop reason: SDUPTHER

## 2019-09-18 ASSESSMENT — ENCOUNTER SYMPTOMS
DIARRHEA: 0
SHORTNESS OF BREATH: 1
COUGH: 1
VOMITING: 0
ABDOMINAL PAIN: 0
SORE THROAT: 0
EYE DISCHARGE: 0
EYE REDNESS: 0
NAUSEA: 0
WHEEZING: 0
RHINORRHEA: 0

## 2019-09-18 NOTE — PROGRESS NOTES
[facility]  [address] [id number]  Dept: Melyssa Dupont is a 60 y.o. female who presents today for her medical conditions/complaintsas noted below. Chief Complaint   Patient presents with    Head Congestion    Cough    Chest Congestion       HPI:     HPI  Pt states started yesterday with sore throat, headache, cough, pain in shoulders. Have sob. Some chills. No fever. States feels like when she had pneumonia last spring. No wheeze. Tried claritin.      LDL Calculated (mg/dL)   Date Value   12/07/2017 138       (goal LDL is <100)   AST (U/L)   Date Value   08/23/2019 20     ALT (U/L)   Date Value   08/23/2019 26     BUN (mg/dL)   Date Value   08/23/2019 15     BP Readings from Last 3 Encounters:   09/18/19 132/82   08/23/19 122/88   05/20/19 136/84          (goal 120/80)    Past Medical History:   Diagnosis Date    Arthritis     Decreased vision of right eye     Dental bridge present     permanent left upper    Deviated septum     Dry skin     GERD (gastroesophageal reflux disease)     History of chest pain     past,stress test negative,poss acid reflux    Sinus infection 01/04/2018    on antibiotics x 10 days,better no cough or fever    Sleep apnea     mild no machine    Snores     Tingling     Urgency of urination     occas      Past Surgical History:   Procedure Laterality Date    CATARACT REMOVAL Bilateral     COLONOSCOPY      EYE SURGERY      EYE SURGERY Bilateral     YAG- right x3, left x2    KNEE SURGERY Right 2013    orif patella    [place] RELEAS VITREOUS,SUBRET/CHOROID FLUID Right 1/17/2018    VITRECTOMY 23 GAUGE, membrane peel performed by Tiny Iraheta MD at [facility] PRE-MALIGNANT / [address] [place] [address] Avenue      x2    VITRECTOMY Right 01/17/2018    WISDOM TOOTH EXTRACTION         Family History   Problem Relation Age of Onset    Cancer Mother         breast    Heart Disease Father    [employer]

## 2019-09-23 ENCOUNTER — APPOINTMENT (OUTPATIENT)
Dept: NUCLEAR MEDICINE | Age: 59
End: 2019-09-23
Payer: COMMERCIAL

## 2019-10-01 RX ORDER — GUAIFENESIN AND PSEUDOEPHEDRINE HCL 1200; 120 MG/1; MG/1
1 TABLET, EXTENDED RELEASE ORAL 2 TIMES DAILY
Qty: 20 TABLET | Refills: 0 | Status: SHIPPED | OUTPATIENT
Start: 2019-10-01 | End: 2019-12-11

## 2019-10-09 ENCOUNTER — TELEPHONE (OUTPATIENT)
Dept: PRIMARY CARE CLINIC | Age: 59
End: 2019-10-09

## 2019-10-09 DIAGNOSIS — J40 BRONCHITIS: Primary | ICD-10-CM

## 2019-10-10 ENCOUNTER — HOSPITAL ENCOUNTER (OUTPATIENT)
Dept: GENERAL RADIOLOGY | Age: 59
Discharge: HOME | End: 2019-10-12
Payer: COMMERCIAL

## 2019-10-10 ENCOUNTER — HOSPITAL ENCOUNTER (OUTPATIENT)
Age: 59
Discharge: HOME | End: 2019-10-12
Payer: COMMERCIAL

## 2019-10-10 ENCOUNTER — HOSPITAL ENCOUNTER (OUTPATIENT)
Age: 59
Discharge: HOME | End: 2019-10-10
Payer: COMMERCIAL

## 2019-10-10 DIAGNOSIS — J40 BRONCHITIS: ICD-10-CM

## 2019-10-10 LAB
-: NORMAL
REASON FOR REJECTION: NORMAL
ZZ NTE CLEAN UP: ORDERED TEST: NORMAL
ZZ NTE WITH NAME CLEAN UP: SPECIMEN SOURCE: NORMAL

## 2019-10-10 PROCEDURE — 86738 MYCOPLASMA ANTIBODY: CPT

## 2019-10-10 PROCEDURE — 71046 X-RAY EXAM CHEST 2 VIEWS: CPT

## 2019-10-10 PROCEDURE — 36415 COLL VENOUS BLD VENIPUNCTURE: CPT

## 2019-10-11 ENCOUNTER — HOSPITAL ENCOUNTER (OUTPATIENT)
Age: 59
Setting detail: SPECIMEN
Discharge: HOME | End: 2019-10-11
Payer: COMMERCIAL

## 2019-10-11 LAB
ABSOLUTE EOS #: 0.2 K/UL (ref 0–0.44)
ABSOLUTE IMMATURE GRANULOCYTE: <0.03 K/UL (ref 0–0.3)
ABSOLUTE LYMPH #: 2 K/UL (ref 1.1–3.7)
ABSOLUTE MONO #: 0.29 K/UL (ref 0.1–1.2)
BASOPHILS # BLD: 1 % (ref 0–2)
BASOPHILS ABSOLUTE: 0.04 K/UL (ref 0–0.2)
DIFFERENTIAL TYPE: NORMAL
EOSINOPHILS RELATIVE PERCENT: 4 % (ref 1–4)
HCT VFR BLD CALC: 42.8 % (ref 36.3–47.1)
HEMOGLOBIN: 13.7 G/DL (ref 11.9–15.1)
IMMATURE GRANULOCYTES: 0 %
LYMPHOCYTES # BLD: 35 % (ref 24–43)
MCH RBC QN AUTO: 28.4 PG (ref 25.2–33.5)
MCHC RBC AUTO-ENTMCNC: 32 G/DL (ref 28.4–34.8)
MCV RBC AUTO: 88.8 FL (ref 82.6–102.9)
MONOCYTES # BLD: 5 % (ref 3–12)
MYCOPLASMA PNEUMONIAE IGM: 0.27
NRBC AUTOMATED: 0 PER 100 WBC
PDW BLD-RTO: 14.3 % (ref 11.8–14.4)
PLATELET # BLD: 383 K/UL (ref 138–453)
PLATELET ESTIMATE: NORMAL
PMV BLD AUTO: 10.3 FL (ref 8.1–13.5)
RBC # BLD: 4.82 M/UL (ref 3.95–5.11)
RBC # BLD: NORMAL 10*6/UL
SEG NEUTROPHILS: 55 % (ref 36–65)
SEGMENTED NEUTROPHILS ABSOLUTE COUNT: 3.15 K/UL (ref 1.5–8.1)
WBC # BLD: 5.7 K/UL (ref 3.5–11.3)
WBC # BLD: NORMAL 10*3/UL

## 2019-10-11 PROCEDURE — 85025 COMPLETE CBC W/AUTO DIFF WBC: CPT

## 2019-10-24 ENCOUNTER — OFFICE VISIT (OUTPATIENT)
Dept: PRIMARY CARE CLINIC | Age: 59
End: 2019-10-24
Payer: COMMERCIAL

## 2019-10-24 VITALS
OXYGEN SATURATION: 98 % | BODY MASS INDEX: 31.62 KG/M2 | SYSTOLIC BLOOD PRESSURE: 136 MMHG | TEMPERATURE: 98.3 F | WEIGHT: 180.2 LBS | DIASTOLIC BLOOD PRESSURE: 88 MMHG | HEART RATE: 91 BPM

## 2019-10-24 DIAGNOSIS — J20.9 ACUTE BRONCHITIS, UNSPECIFIED ORGANISM: Primary | ICD-10-CM

## 2019-10-24 DIAGNOSIS — Z71.1 PERSON WITH FEARED COMPLAINT, NO DIAGNOSIS MADE: ICD-10-CM

## 2019-10-24 PROCEDURE — 99213 OFFICE O/P EST LOW 20 MIN: CPT | Performed by: PHYSICIAN ASSISTANT

## 2019-10-24 RX ORDER — OMEPRAZOLE 20 MG/1
20 CAPSULE, DELAYED RELEASE ORAL DAILY
Qty: 90 CAPSULE | Refills: 1 | Status: SHIPPED | OUTPATIENT
Start: 2019-10-24 | End: 2020-04-06

## 2019-10-24 RX ORDER — DOXYCYCLINE HYCLATE 100 MG/1
100 CAPSULE ORAL 2 TIMES DAILY
Qty: 20 CAPSULE | Refills: 0 | Status: SHIPPED | OUTPATIENT
Start: 2019-10-24 | End: 2019-11-03

## 2019-10-24 RX ORDER — PREDNISONE 20 MG/1
40 TABLET ORAL DAILY
Qty: 10 TABLET | Refills: 0 | Status: SHIPPED | OUTPATIENT
Start: 2019-10-24 | End: 2019-10-29

## 2019-10-24 RX ORDER — BENZONATATE 100 MG/1
100 CAPSULE ORAL 3 TIMES DAILY PRN
Qty: 30 CAPSULE | Refills: 0 | Status: SHIPPED | OUTPATIENT
Start: 2019-10-24 | End: 2020-11-12

## 2019-10-24 RX ORDER — ALBUTEROL SULFATE 2.5 MG/3ML
2.5 SOLUTION RESPIRATORY (INHALATION) EVERY 6 HOURS PRN
Qty: 60 VIAL | Refills: 1 | Status: SHIPPED | OUTPATIENT
Start: 2019-10-24 | End: 2020-11-12

## 2019-10-24 ASSESSMENT — ENCOUNTER SYMPTOMS
COUGH: 1
CHEST TIGHTNESS: 1
WHEEZING: 1
SHORTNESS OF BREATH: 1

## 2019-10-31 ASSESSMENT — ENCOUNTER SYMPTOMS: SORE THROAT: 0

## 2019-11-11 ENCOUNTER — HOSPITAL ENCOUNTER (OUTPATIENT)
Dept: NUCLEAR MEDICINE | Age: 59
Discharge: HOME | End: 2019-11-13
Payer: COMMERCIAL

## 2019-11-11 VITALS — HEIGHT: 64 IN | WEIGHT: 177 LBS | BODY MASS INDEX: 30.22 KG/M2

## 2019-11-11 DIAGNOSIS — R10.816 EPIGASTRIC ABDOMINAL TENDERNESS, REBOUND TENDERNESS PRESENCE NOT SPECIFIED: ICD-10-CM

## 2019-11-11 PROCEDURE — 3430000000 HC RX DIAGNOSTIC RADIOPHARMACEUTICAL: Performed by: FAMILY MEDICINE

## 2019-11-11 PROCEDURE — A9537 TC99M MEBROFENIN: HCPCS | Performed by: FAMILY MEDICINE

## 2019-11-11 PROCEDURE — 78227 HEPATOBIL SYST IMAGE W/DRUG: CPT

## 2019-11-11 PROCEDURE — 2580000003 HC RX 258: Performed by: FAMILY MEDICINE

## 2019-11-11 RX ORDER — SODIUM CHLORIDE 0.9 % (FLUSH) 0.9 %
10 SYRINGE (ML) INJECTION PRN
Status: DISCONTINUED | OUTPATIENT
Start: 2019-11-11 | End: 2019-11-14 | Stop reason: HOSPADM

## 2019-11-11 RX ADMIN — Medication 5.5 MILLICURIE: at 10:40

## 2019-11-11 RX ADMIN — Medication 10 ML: at 10:40

## 2019-11-25 ENCOUNTER — TELEPHONE (OUTPATIENT)
Dept: PRIMARY CARE CLINIC | Age: 59
End: 2019-11-25

## 2019-11-25 RX ORDER — FLUCONAZOLE 150 MG/1
150 TABLET ORAL
Qty: 2 TABLET | Refills: 0 | Status: SHIPPED | OUTPATIENT
Start: 2019-11-25 | End: 2019-11-27

## 2019-12-11 ENCOUNTER — OFFICE VISIT (OUTPATIENT)
Dept: PRIMARY CARE CLINIC | Age: 59
End: 2019-12-11
Payer: COMMERCIAL

## 2019-12-11 VITALS
OXYGEN SATURATION: 98 % | WEIGHT: 182.6 LBS | HEART RATE: 87 BPM | DIASTOLIC BLOOD PRESSURE: 80 MMHG | SYSTOLIC BLOOD PRESSURE: 122 MMHG | TEMPERATURE: 98.5 F | BODY MASS INDEX: 31.34 KG/M2

## 2019-12-11 DIAGNOSIS — Z23 NEED FOR VACCINATION: ICD-10-CM
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
## 2019-12-11 DIAGNOSIS — R68.2 DRY MOUTH: ICD-10-CM

## 2019-12-11 PROCEDURE — 99213 OFFICE O/P EST LOW 20 MIN: CPT | Performed by: PHYSICIAN ASSISTANT

## 2019-12-11 PROCEDURE — 90471 IMMUNIZATION ADMIN: CPT | Performed by: PHYSICIAN ASSISTANT

## 2019-12-11 PROCEDURE — 90686 IIV4 VACC NO PRSV 0.5 ML IM: CPT | Performed by: PHYSICIAN ASSISTANT

## 2019-12-11 RX ORDER — BUPROPION HYDROCHLORIDE 150 MG/1
150 TABLET, EXTENDED RELEASE ORAL 2 TIMES DAILY
COMMUNITY
End: 2020-11-12

## 2020-02-03 ENCOUNTER — OFFICE VISIT (OUTPATIENT)
Dept: PRIMARY CARE CLINIC | Age: 60
End: 2020-02-03
Payer: COMMERCIAL

## 2020-02-03 ENCOUNTER — TELEPHONE (OUTPATIENT)
Dept: PRIMARY CARE CLINIC | Age: 60
End: 2020-02-03

## 2020-02-03 VITALS
HEIGHT: 64 IN | WEIGHT: 182.8 LBS | TEMPERATURE: 98.7 F | BODY MASS INDEX: 31.21 KG/M2 | OXYGEN SATURATION: 97 % | HEART RATE: 92 BPM | SYSTOLIC BLOOD PRESSURE: 124 MMHG | DIASTOLIC BLOOD PRESSURE: 70 MMHG

## 2020-02-03 PROBLEM — Z91.09 ENVIRONMENTAL ALLERGIES: Status: ACTIVE | Noted: 2020-02-03

## 2020-02-03 PROCEDURE — 99213 OFFICE O/P EST LOW 20 MIN: CPT | Performed by: NURSE PRACTITIONER

## 2020-02-03 RX ORDER — AMOXICILLIN AND CLAVULANATE POTASSIUM 875; 125 MG/1; MG/1
1 TABLET, FILM COATED ORAL 2 TIMES DAILY
Qty: 20 TABLET | Refills: 0 | Status: SHIPPED | OUTPATIENT
Start: 2020-02-03 | End: 2020-02-13

## 2020-02-03 RX ORDER — FLUCONAZOLE 150 MG/1
150 TABLET ORAL WEEKLY
Qty: 2 TABLET | Refills: 0 | Status: SHIPPED | OUTPATIENT
Start: 2020-02-03 | End: 2020-11-12

## 2020-02-03 ASSESSMENT — ENCOUNTER SYMPTOMS
COUGH: 1
HOARSE VOICE: 1
SINUS PAIN: 1
EYE PAIN: 0
NAUSEA: 0
DIARRHEA: 0
EYE REDNESS: 0
SHORTNESS OF BREATH: 0
SORE THROAT: 1
SINUS PRESSURE: 1

## 2020-02-03 ASSESSMENT — PATIENT HEALTH QUESTIONNAIRE - PHQ9
2. FEELING DOWN, DEPRESSED OR HOPELESS: 0
SUM OF ALL RESPONSES TO PHQ QUESTIONS 1-9: 0
1. LITTLE INTEREST OR PLEASURE IN DOING THINGS: 0
SUM OF ALL RESPONSES TO PHQ QUESTIONS 1-9: 0
SUM OF ALL RESPONSES TO PHQ9 QUESTIONS 1 & 2: 0

## 2020-02-03 NOTE — PROGRESS NOTES
VITRECTOMY 23 GAUGE, membrane peel performed by Chin Horne MD at [facility] / [address]      x2    VITRECTOMY Right 01/17/2018    WISDOM TOOTH EXTRACTION         Family History   Problem Relation Age of Onset    Cancer Mother         breast    Heart Disease Father     Diabetes Father        Social History     Tobacco Use    Smoking status: Never Smoker    Smokeless tobacco: Never Used   Substance Use Topics    Alcohol use:  Yes     Alcohol/week: 0.0 standard drinks      Current Outpatient Medications   Medication Sig Dispense Refill    amoxicillin-clavulanate (AUGMENTIN) 875-125 MG per tablet Take 1 tablet by mouth 2 times daily for 10 days 20 tablet 0    fluconazole (DIFLUCAN) 150 MG tablet Take 1 tablet by mouth once a week 2 tablet 0    buPROPion (WELLBUTRIN SR) 150 MG extended release tablet Take 150 mg by mouth 2 times daily      omeprazole (PRILOSEC) 20 MG delayed release capsule Take 1 capsule by mouth Daily 90 capsule 1    albuterol (PROVENTIL) (2.5 MG/3ML) 0.083% nebulizer solution Take 3 mLs by nebulization every 6 hours as needed for Wheezing or Shortness of Breath 60 vial 1    albuterol sulfate (PROAIR RESPICLICK) 516 (90 Base) MCG/ACT aerosol powder inhalation Inhale 2 puffs into the lungs every 6 hours as needed for Wheezing or Shortness of Breath 1 Inhaler 1    etodolac (LODINE) 400 MG tablet TAKE 1 TABLET TWICE A  tablet 1    cetirizine (ZYRTEC) 10 MG tablet Take 10 mg by mouth daily      PREVIDENT 5000 BOOSTER PLUS 1.1 % PSTE       Elastic Bandages & Supports (WRIST SPLINT) MISC Wear on left wrist daily for 2 weeks 1 each 0    montelukast (SINGULAIR) 10 MG tablet Take 10 mg by mouth nightly      Ascorbic Acid (VITAMIN C) 1000 MG tablet Take 1,000 mg by mouth 2 times daily      zolpidem (AMBIEN) 10 MG tablet Take by mouth nightly as needed for Sleep (1/2 a pill as needed for sleep)      aspirin 325 MG tablet Take 325 mg by mouth daily Physical Exam  Constitutional:       Appearance: She is obese. HENT:      Head: Normocephalic and atraumatic. Right Ear: Tympanic membrane, ear canal and external ear normal.      Left Ear: Tympanic membrane, ear canal and external ear normal.      Nose: Mucosal edema and congestion present. Right Sinus: Maxillary sinus tenderness present. No frontal sinus tenderness. Left Sinus: Maxillary sinus tenderness present. No frontal sinus tenderness. Mouth/Throat:      Mouth: Mucous membranes are moist.      Pharynx: Oropharynx is clear. Eyes:      Conjunctiva/sclera: Conjunctivae normal.      Pupils: Pupils are equal, round, and reactive to light. Neck:      Musculoskeletal: Normal range of motion and neck supple. Vascular: No carotid bruit. Cardiovascular:      Rate and Rhythm: Normal rate and regular rhythm. Heart sounds: Normal heart sounds. Pulmonary:      Effort: Pulmonary effort is normal.      Breath sounds: Normal breath sounds. Abdominal:      General: Bowel sounds are normal.      Palpations: Abdomen is soft. Skin:     General: Skin is warm and dry. Capillary Refill: Capillary refill takes less than 2 seconds. Neurological:      Mental Status: She is alert and oriented to person, place, and time. Cranial Nerves: No cranial nerve deficit. Psychiatric:         Mood and Affect: Mood normal.         Thought Content: Thought content normal.         Judgment: Judgment normal.         Assessment:       Diagnosis Orders   1. Antibiotic-induced yeast infection  fluconazole (DIFLUCAN) 150 MG tablet   2. Suppurative otitis media of right ear, unspecified chronicity  amoxicillin-clavulanate (AUGMENTIN) 875-125 MG per tablet        Plan:    amoxicillin-clavulanate  Fluconazole  Benzonatate     Return if symptoms worsen or fail to improve. No orders of the defined types were placed in this encounter.     Orders Placed This Encounter   Medications    amoxicillin-clavulanate (AUGMENTIN) 875-125 MG per tablet     Sig: Take 1 tablet by mouth 2 times daily for 10 days     Dispense:  20 tablet     Refill:  0    fluconazole (DIFLUCAN) 150 MG tablet     Sig: Take 1 tablet by mouth once a week     Dispense:  2 tablet     Refill:  0       Patient given educationalmaterials - see patient instructions. Discussed use, benefit, and side effectsof prescribed medications. All patient questions answered. Pt voiced understanding. Reviewed health maintenance. Instructed to continue current medications, diet andexercise. Patient agreed with treatment plan. Follow up as directed.      Electronicallysigned by COLTON Jimenez CNP on 2/3/2020 at 11:51 AM

## 2020-02-03 NOTE — PATIENT INSTRUCTIONS
Patient Education        Saline Nasal Washes: Care Instructions  Your Care Instructions  Saline nasal washes help keep the nasal passages open by washing out thick or dried mucus. This simple remedy can help relieve symptoms of allergies, sinusitis, and colds. It also can make the nose feel more comfortable by keeping the mucous membranes moist. You may notice a little burning sensation in your nose the first few times you use the solution, but this usually gets better in a few days. Follow-up care is a key part of your treatment and safety. Be sure to make and go to all appointments, and call your doctor if you are having problems. It's also a good idea to know your test results and keep a list of the medicines you take. How can you care for yourself at home? · You can buy premixed saline solution in a squeeze bottle or other sinus rinse products at a drugstore. Read and follow the instructions on the label. · You also can make your own saline solution by adding 1 teaspoon of salt and 1 teaspoon of baking soda to 2 cups of distilled water. · If you use a homemade solution, pour a small amount into a clean bowl. Using a rubber bulb syringe, squeeze the syringe and place the tip in the salt water. Pull a small amount of the salt water into the syringe by relaxing your hand. · Sit down with your head tilted slightly back. Do not lie down. Put the tip of the bulb syringe or the squeeze bottle a little way into one of your nostrils. Gently drip or squirt a few drops into the nostril. Repeat with the other nostril. Some sneezing and gagging are normal at first.  · Gently blow your nose. · Wipe the syringe or bottle tip clean after each use. · Repeat this 2 or 3 times a day. · Use nasal washes gently if you have nosebleeds often. When should you call for help?   Watch closely for changes in your health, and be sure to contact your doctor if:    · You often get nosebleeds.     · You have problems doing the nasal washes. Where can you learn more? Go to [email or web address]. org and sign in to your [employer] account. Enter [phone] in the [facility] box to learn more about \"Saline Nasal Washes: Care Instructions. \"     If you do not have an account, please click on the \"Sign Up Now\" link. Current as of: July 28, 2019  Content Version: 12.3  © 8054-3937 [employer]. Care instructions adapted under license by [facility] ([facility]). If you have questions about a medical condition or this instruction, always ask your healthcare professional. [address] any warranty or liability for your use of this information.

## 2020-04-06 RX ORDER — MONTELUKAST SODIUM 10 MG/1
10 TABLET ORAL NIGHTLY
Qty: 90 TABLET | Refills: 1 | Status: SHIPPED | OUTPATIENT
Start: 2020-04-06 | End: 2020-10-05

## 2020-04-06 RX ORDER — OMEPRAZOLE 20 MG/1
CAPSULE, DELAYED RELEASE ORAL
Qty: 90 CAPSULE | Refills: 3 | Status: SHIPPED | OUTPATIENT
Start: 2020-04-06 | End: 2021-06-22

## 2020-04-06 RX ORDER — BUPROPION HCL 150 MG
TABLET,SUSTAINED-RELEASE 12 HR ORAL
Qty: 180 TABLET | Refills: 1 | Status: SHIPPED | OUTPATIENT
Start: 2020-04-06 | End: 2020-11-12

## 2020-06-29 ENCOUNTER — TELEPHONE (OUTPATIENT)
Dept: PRIMARY CARE CLINIC | Age: 60
End: 2020-06-29

## 2020-06-29 RX ORDER — FLUCONAZOLE 150 MG/1
150 TABLET ORAL DAILY
Qty: 2 TABLET | Refills: 0 | Status: SHIPPED | OUTPATIENT
Start: 2020-06-29 | End: 2020-11-12

## 2020-09-28 ENCOUNTER — NURSE TRIAGE (OUTPATIENT)
Dept: OTHER | Facility: CLINIC | Age: 60
End: 2020-09-28

## 2020-10-01 ENCOUNTER — OFFICE VISIT (OUTPATIENT)
Dept: PRIMARY CARE CLINIC | Age: 60
End: 2020-10-01
Payer: COMMERCIAL

## 2020-10-01 VITALS
WEIGHT: 184.6 LBS | HEART RATE: 97 BPM | BODY MASS INDEX: 31.51 KG/M2 | TEMPERATURE: 98.2 F | SYSTOLIC BLOOD PRESSURE: 148 MMHG | DIASTOLIC BLOOD PRESSURE: 100 MMHG | HEIGHT: 64 IN | OXYGEN SATURATION: 97 %

## 2020-10-01 PROCEDURE — 99214 OFFICE O/P EST MOD 30 MIN: CPT | Performed by: FAMILY MEDICINE

## 2020-10-01 PROCEDURE — 90471 IMMUNIZATION ADMIN: CPT | Performed by: FAMILY MEDICINE

## 2020-10-01 PROCEDURE — 90686 IIV4 VACC NO PRSV 0.5 ML IM: CPT | Performed by: FAMILY MEDICINE

## 2020-10-01 RX ORDER — AMOXICILLIN AND CLAVULANATE POTASSIUM 875; 125 MG/1; MG/1
1 TABLET, FILM COATED ORAL 2 TIMES DAILY
Qty: 20 TABLET | Refills: 0 | Status: SHIPPED | OUTPATIENT
Start: 2020-10-01 | End: 2020-10-11

## 2020-10-01 RX ORDER — FLUCONAZOLE 150 MG/1
150 TABLET ORAL WEEKLY
Qty: 2 TABLET | Refills: 0 | Status: SHIPPED | OUTPATIENT
Start: 2020-10-01 | End: 2020-11-12

## 2020-10-01 ASSESSMENT — PATIENT HEALTH QUESTIONNAIRE - PHQ9
1. LITTLE INTEREST OR PLEASURE IN DOING THINGS: 0
2. FEELING DOWN, DEPRESSED OR HOPELESS: 0
SUM OF ALL RESPONSES TO PHQ9 QUESTIONS 1 & 2: 0
SUM OF ALL RESPONSES TO PHQ QUESTIONS 1-9: 0
SUM OF ALL RESPONSES TO PHQ QUESTIONS 1-9: 0

## 2020-10-01 ASSESSMENT — ENCOUNTER SYMPTOMS
SHORTNESS OF BREATH: 0
EYE DISCHARGE: 0
NAUSEA: 0
ABDOMINAL PAIN: 0
EYE REDNESS: 0
DIARRHEA: 0
COUGH: 0
WHEEZING: 0
RHINORRHEA: 0
SORE THROAT: 0
VOMITING: 0

## 2020-10-01 NOTE — PROGRESS NOTES
[facility] PRIMARY CARE  [address] [facility] [place] [address]  Dept: Melyssa Dupont is a 63 y.o. female who presents today for her medical conditions/complaintsas noted below. Chief Complaint   Patient presents with    Other     Pain right side of throat    Flu Vaccine       HPI:     HPI  Pt with pain on right side of throat. States started around June. No mass or swelling. States feels \"thick\". Pain worse in am.  No smoking. Patient states he has been having persistent headaches as well.     LDL Calculated (mg/dL)   Date Value   12/07/2017 138       (goal LDL is <100)   AST (U/L)   Date Value   08/23/2019 20     ALT (U/L)   Date Value   08/23/2019 26     BUN (mg/dL)   Date Value   08/23/2019 15     BP Readings from Last 3 Encounters:   10/01/20 (!) 148/100   02/03/20 124/70   12/11/19 122/80          (goal 120/80)    Past Medical History:   Diagnosis Date    Arthritis     Decreased vision of right eye     Dental bridge present     permanent left upper    Deviated septum     Dry skin     GERD (gastroesophageal reflux disease)     History of chest pain     past,stress test negative,poss acid reflux    Sinus infection 01/04/2018    on antibiotics x 10 days,better no cough or fever    Sleep apnea     mild no machine    Snores     Tingling     Urgency of urination     occas      Past Surgical History:   Procedure Laterality Date    CATARACT REMOVAL Bilateral     COLONOSCOPY      EYE SURGERY      EYE SURGERY Bilateral     YAG- right x3, left x2    KNEE SURGERY Right 2013    orif patella    [place] RELEAS VITREOUS,SUBRET/CHOROID FLUID Right 1/17/2018    VITRECTOMY 23 GAUGE, membrane peel performed by Sue Monte MD at [address] [facility] / [address]      x2    VITRECTOMY Right 01/17/2018    WISDOM TOOTH EXTRACTION         Family History   Problem Relation Age of Onset    Cancer Mother         breast    Heart Disease Father     Diabetes Father        Social History     Tobacco Use    Smoking status: Never Smoker    Smokeless tobacco: Never Used   Substance Use Topics    Alcohol use:  Yes     Alcohol/week: 0.0 standard drinks      Current Outpatient Medications   Medication Sig Dispense Refill    amoxicillin-clavulanate (AUGMENTIN) 875-125 MG per tablet Take 1 tablet by mouth 2 times daily for 10 days 20 tablet 0    fluconazole (DIFLUCAN) 150 MG tablet Take 1 tablet by mouth once a week 2 tablet 0    fluconazole (DIFLUCAN) 150 MG tablet Take 1 tablet by mouth daily 2 tablet 0    omeprazole (PRILOSEC) 20 MG delayed release capsule TAKE 1 CAPSULE DAILY 90 capsule 3    montelukast (SINGULAIR) 10 MG tablet Take 1 tablet by mouth nightly 90 tablet 1    WELLBUTRIN  MG extended release tablet TAKE 1 TABLET TWICE A  tablet 1    fluconazole (DIFLUCAN) 150 MG tablet Take 1 tablet by mouth once a week 2 tablet 0    buPROPion (WELLBUTRIN SR) 150 MG extended release tablet Take 150 mg by mouth 2 times daily      albuterol (PROVENTIL) (2.5 MG/3ML) 0.083% nebulizer solution Take 3 mLs by nebulization every 6 hours as needed for Wheezing or Shortness of Breath 60 vial 1    benzonatate (TESSALON) 100 MG capsule Take 1 capsule by mouth 3 times daily as needed for Cough 30 capsule 0    albuterol sulfate (PROAIR RESPICLICK) 074 (90 Base) MCG/ACT aerosol powder inhalation Inhale 2 puffs into the lungs every 6 hours as needed for Wheezing or Shortness of Breath 1 Inhaler 1    etodolac (LODINE) 400 MG tablet TAKE 1 TABLET TWICE A  tablet 1    cetirizine (ZYRTEC) 10 MG tablet Take 10 mg by mouth daily      PREVIDENT 5000 BOOSTER PLUS 1.1 % PSTE       Elastic Bandages & Supports (WRIST SPLINT) MISC Wear on left wrist daily for 2 weeks 1 each 0    Ascorbic Acid (VITAMIN C) 1000 MG tablet Take 1,000 mg by mouth 2 times daily      zolpidem (AMBIEN) 10 MG tablet Take by mouth nightly as needed for Sleep (1/2 a pill as needed for sleep)      aspirin 325 MG tablet Take 325 mg by mouth daily      Omega-3 Fatty Acids (FISH OIL) 1000 MG CAPS Take 1,000 mg by mouth 2 times daily      b complex vitamins capsule Take 1 capsule by mouth daily       No current facility-administered medications for this visit. Allergies   Allergen Reactions    Protonix [Pantoprazole] Hives and Itching    Sulfa Antibiotics Itching       Health Maintenance   Topic Date Due    HIV screen  03/20/1974    Shingles Vaccine (2 of 3) 02/03/2016    Flu vaccine (1) 09/01/2020    Breast cancer screen  02/08/2021    Cervical cancer screen  02/22/2021    Lipid screen  12/07/2022    DTaP/Tdap/Td vaccine (4 - Td) 01/13/2024    Colon cancer screen colonoscopy  02/13/2028    Hepatitis C screen  Completed    Hepatitis A vaccine  Aged Out    Hepatitis B vaccine  Aged Out    Hib vaccine  Aged Out    Meningococcal (ACWY) vaccine  Aged Out    Pneumococcal 0-64 years Vaccine  Aged Out       Subjective:      Review of Systems   Constitutional: Negative for chills and fever. HENT: Positive for postnasal drip. Negative for rhinorrhea and sore throat. Eyes: Negative for discharge and redness. Respiratory: Negative for cough, shortness of breath and wheezing. Cardiovascular: Negative for chest pain and palpitations. Gastrointestinal: Negative for abdominal pain, diarrhea, nausea and vomiting. Genitourinary: Negative for dysuria and frequency. Musculoskeletal: Negative for arthralgias and myalgias. Neurological: Negative for dizziness, light-headedness and headaches. Psychiatric/Behavioral: Negative for sleep disturbance. Objective:     BP (!) 148/100   Pulse 97   Temp 98.2 °F (36.8 °C)   Ht 5' 3.96\" (1.625 m)   Wt 184 lb 9.6 oz (83.7 kg)   SpO2 97%   BMI 31.73 kg/m²   Physical Exam  Vitals signs and nursing note reviewed. Constitutional:       General: She is not in acute distress. Appearance: She is well-developed. She is not ill-appearing. HENT:      Head: Normocephalic and atraumatic. Right Ear: External ear normal.      Left Ear: External ear normal.   Eyes:      General: No scleral icterus. Right eye: No discharge. Left eye: No discharge. Conjunctiva/sclera: Conjunctivae normal.      Pupils: Pupils are equal, round, and reactive to light. Neck:      Thyroid: No thyromegaly. Trachea: No tracheal deviation. Comments: Slight fullness noted on the right side of the neck but no discrete mass could be appreciated. Cardiovascular:      Rate and Rhythm: Normal rate and regular rhythm. Heart sounds: Normal heart sounds. Pulmonary:      Effort: Pulmonary effort is normal. No respiratory distress. Breath sounds: Normal breath sounds. No wheezing. Lymphadenopathy:      Cervical: No cervical adenopathy. Skin:     General: Skin is warm. Findings: No rash. Neurological:      Mental Status: She is alert and oriented to person, place, and time. Psychiatric:         Mood and Affect: Mood normal.         Behavior: Behavior normal.         Thought Content: Thought content normal.         Assessment:       Diagnosis Orders   1. Need for vaccination  INFLUENZA, QUADV, 0.5ML, 6 MO AND OLDER, IM, PF, PREFILL SYR OR SDV (FLUZONE QUADV, PF)   2. Encounter for screening mammogram for malignant neoplasm of breast  RIC DIGITAL SCREEN W OR WO CAD BILATERAL   3. Neck fullness  CBC With Auto Differential    Sedimentation Rate    CT SOFT TISSUE NECK W CONTRAST    amoxicillin-clavulanate (AUGMENTIN) 875-125 MG per tablet    TSH    T4, Free   4. Encounter for lipid screening for cardiovascular disease  Lipid, Fasting   5. Annual physical exam  Basic Metabolic Panel, Fasting    fluconazole (DIFLUCAN) 150 MG tablet        Plan:    Blood work ordered. Mammogram ordered. Soft tissue CT neck. Augmentin for potential infectious lymphadenopathy. Flu shot today.     Return in about 6 months (around 4/1/2021). Orders Placed This Encounter   Procedures    RIC DIGITAL SCREEN W OR WO CAD BILATERAL     Standing Status:   Future     Standing Expiration Date:   12/1/2021     Order Specific Question:   Reason for exam:     Answer:   screen    CT SOFT TISSUE NECK W CONTRAST     Standing Status:   Future     Standing Expiration Date:   10/1/2021     Order Specific Question:   Reason for exam:     Answer:   right sided neck fullness    INFLUENZA, QUADV, 0.5ML, 6 MO AND OLDER, IM, PF, PREFILL SYR OR SDV (FLUZONE QUADV, PF)    Lipid, Fasting     Standing Status:   Future     Standing Expiration Date:   10/1/2021    Basic Metabolic Panel, Fasting     Standing Status:   Future     Standing Expiration Date:   10/1/2021    CBC With Auto Differential     Standing Status:   Future     Standing Expiration Date:   10/1/2021    Sedimentation Rate     Standing Status:   Future     Standing Expiration Date:   10/1/2021    TSH     Standing Status:   Future     Standing Expiration Date:   10/1/2021    T4, Free     Standing Status:   Future     Standing Expiration Date:   10/1/2021     Orders Placed This Encounter   Medications    amoxicillin-clavulanate (AUGMENTIN) 875-125 MG per tablet     Sig: Take 1 tablet by mouth 2 times daily for 10 days     Dispense:  20 tablet     Refill:  0    fluconazole (DIFLUCAN) 150 MG tablet     Sig: Take 1 tablet by mouth once a week     Dispense:  2 tablet     Refill:  0       Patient given educationalmaterials - see patient instructions. Discussed use, benefit, and side effectsof prescribed medications. All patient questions answered. Pt voiced understanding. Reviewed health maintenance. Instructed to continue current medications, diet andexercise. Patient agreed with treatment plan. Follow up as directed.      Electronicallysigned by Willian Brunner, MD on 10/1/2020 at 10:09 AM

## 2020-10-03 ENCOUNTER — HOSPITAL ENCOUNTER (OUTPATIENT)
Age: 60
Discharge: HOME | End: 2020-10-03
Payer: COMMERCIAL

## 2020-10-03 LAB
ABSOLUTE EOS #: 0.24 K/UL (ref 0–0.44)
ABSOLUTE IMMATURE GRANULOCYTE: <0.03 K/UL (ref 0–0.3)
ABSOLUTE LYMPH #: 1.76 K/UL (ref 1.1–3.7)
ABSOLUTE MONO #: 0.28 K/UL (ref 0.1–1.2)
ANION GAP SERPL CALCULATED.3IONS-SCNC: 13 MMOL/L (ref 9–17)
BASOPHILS # BLD: 1 % (ref 0–2)
BASOPHILS ABSOLUTE: 0.06 K/UL (ref 0–0.2)
BUN BLDV-MCNC: 13 MG/DL (ref 8–23)
BUN/CREAT BLD: NORMAL (ref 9–20)
CALCIUM SERPL-MCNC: 9.7 MG/DL (ref 8.6–10.4)
CHLORIDE BLD-SCNC: 103 MMOL/L (ref 98–107)
CHOLESTEROL, FASTING: 195 MG/DL
CHOLESTEROL/HDL RATIO: 4
CO2: 23 MMOL/L (ref 20–31)
CREAT SERPL-MCNC: 0.59 MG/DL (ref 0.5–0.9)
DIFFERENTIAL TYPE: ABNORMAL
EOSINOPHILS RELATIVE PERCENT: 4 % (ref 1–4)
GFR AFRICAN AMERICAN: >60 ML/MIN
GFR NON-AFRICAN AMERICAN: >60 ML/MIN
GFR SERPL CREATININE-BSD FRML MDRD: NORMAL ML/MIN/{1.73_M2}
GFR SERPL CREATININE-BSD FRML MDRD: NORMAL ML/MIN/{1.73_M2}
GLUCOSE FASTING: 98 MG/DL (ref 70–99)
HCT VFR BLD CALC: 43.8 % (ref 36.3–47.1)
HDLC SERPL-MCNC: 49 MG/DL
HEMOGLOBIN: 14.1 G/DL (ref 11.9–15.1)
IMMATURE GRANULOCYTES: 0 %
LDL CHOLESTEROL: 104 MG/DL (ref 0–130)
LYMPHOCYTES # BLD: 29 % (ref 24–43)
MCH RBC QN AUTO: 28.3 PG (ref 25.2–33.5)
MCHC RBC AUTO-ENTMCNC: 32.2 G/DL (ref 28.4–34.8)
MCV RBC AUTO: 87.8 FL (ref 82.6–102.9)
MONOCYTES # BLD: 5 % (ref 3–12)
NRBC AUTOMATED: 0 PER 100 WBC
PDW BLD-RTO: 14.6 % (ref 11.8–14.4)
PLATELET # BLD: 367 K/UL (ref 138–453)
PLATELET ESTIMATE: ABNORMAL
PMV BLD AUTO: 10.2 FL (ref 8.1–13.5)
POTASSIUM SERPL-SCNC: 4.7 MMOL/L (ref 3.7–5.3)
RBC # BLD: 4.99 M/UL (ref 3.95–5.11)
RBC # BLD: ABNORMAL 10*6/UL
SEDIMENTATION RATE, ERYTHROCYTE: 20 MM (ref 0–30)
SEG NEUTROPHILS: 61 % (ref 36–65)
SEGMENTED NEUTROPHILS ABSOLUTE COUNT: 3.78 K/UL (ref 1.5–8.1)
SODIUM BLD-SCNC: 139 MMOL/L (ref 135–144)
THYROXINE, FREE: 1.3 NG/DL (ref 0.93–1.7)
TRIGLYCERIDE, FASTING: 212 MG/DL
TSH SERPL DL<=0.05 MIU/L-ACNC: 0.81 MIU/L (ref 0.3–5)
VLDLC SERPL CALC-MCNC: ABNORMAL MG/DL (ref 1–30)
WBC # BLD: 6.1 K/UL (ref 3.5–11.3)
WBC # BLD: ABNORMAL 10*3/UL

## 2020-10-03 PROCEDURE — 84439 ASSAY OF FREE THYROXINE: CPT

## 2020-10-03 PROCEDURE — 84443 ASSAY THYROID STIM HORMONE: CPT

## 2020-10-03 PROCEDURE — 85652 RBC SED RATE AUTOMATED: CPT

## 2020-10-03 PROCEDURE — 85025 COMPLETE CBC W/AUTO DIFF WBC: CPT

## 2020-10-03 PROCEDURE — 80061 LIPID PANEL: CPT

## 2020-10-03 PROCEDURE — 80048 BASIC METABOLIC PNL TOTAL CA: CPT

## 2020-10-03 PROCEDURE — 36415 COLL VENOUS BLD VENIPUNCTURE: CPT

## 2020-10-05 RX ORDER — MONTELUKAST SODIUM 10 MG/1
TABLET ORAL
Qty: 90 TABLET | Refills: 3 | Status: SHIPPED | OUTPATIENT
Start: 2020-10-05 | End: 2022-03-08

## 2020-10-15 ENCOUNTER — HOSPITAL ENCOUNTER (OUTPATIENT)
Dept: CT IMAGING | Age: 60
Discharge: HOME | End: 2020-10-17
Payer: COMMERCIAL

## 2020-10-15 PROCEDURE — 2580000003 HC RX 258: Performed by: FAMILY MEDICINE

## 2020-10-15 PROCEDURE — 6360000004 HC RX CONTRAST MEDICATION: Performed by: FAMILY MEDICINE

## 2020-10-15 PROCEDURE — 70491 CT SOFT TISSUE NECK W/DYE: CPT

## 2020-10-15 RX ORDER — 0.9 % SODIUM CHLORIDE 0.9 %
80 INTRAVENOUS SOLUTION INTRAVENOUS ONCE
Status: COMPLETED | OUTPATIENT
Start: 2020-10-15 | End: 2020-10-15

## 2020-10-15 RX ORDER — SODIUM CHLORIDE 0.9 % (FLUSH) 0.9 %
10 SYRINGE (ML) INJECTION ONCE
Status: COMPLETED | OUTPATIENT
Start: 2020-10-15 | End: 2020-10-15

## 2020-10-15 RX ADMIN — SODIUM CHLORIDE 80 ML: 9 INJECTION, SOLUTION INTRAVENOUS at 11:02

## 2020-10-15 RX ADMIN — Medication 10 ML: at 11:01

## 2020-10-15 RX ADMIN — IOHEXOL 75 ML: 350 INJECTION, SOLUTION INTRAVENOUS at 11:01

## 2020-11-12 ENCOUNTER — HOSPITAL ENCOUNTER (OUTPATIENT)
Age: 60
Discharge: HOME | End: 2020-11-12
Payer: COMMERCIAL

## 2020-11-12 ENCOUNTER — OFFICE VISIT (OUTPATIENT)
Dept: PRIMARY CARE CLINIC | Age: 60
End: 2020-11-12
Payer: COMMERCIAL

## 2020-11-12 ENCOUNTER — HOSPITAL ENCOUNTER (OUTPATIENT)
Dept: GENERAL RADIOLOGY | Age: 60
Discharge: HOME | End: 2020-11-14
Payer: COMMERCIAL

## 2020-11-12 VITALS
WEIGHT: 184.8 LBS | TEMPERATURE: 97.2 F | DIASTOLIC BLOOD PRESSURE: 84 MMHG | BODY MASS INDEX: 31.76 KG/M2 | SYSTOLIC BLOOD PRESSURE: 130 MMHG

## 2020-11-12 PROCEDURE — 73502 X-RAY EXAM HIP UNI 2-3 VIEWS: CPT

## 2020-11-12 PROCEDURE — 72100 X-RAY EXAM L-S SPINE 2/3 VWS: CPT

## 2020-11-12 PROCEDURE — 99214 OFFICE O/P EST MOD 30 MIN: CPT | Performed by: NURSE PRACTITIONER

## 2020-11-12 RX ORDER — PREDNISONE 20 MG/1
20 TABLET ORAL 2 TIMES DAILY
Qty: 6 TABLET | Refills: 0 | Status: SHIPPED | OUTPATIENT
Start: 2020-11-12 | End: 2020-11-15

## 2020-11-12 RX ORDER — CYCLOBENZAPRINE HCL 5 MG
5 TABLET ORAL 2 TIMES DAILY PRN
Qty: 10 TABLET | Refills: 0 | Status: SHIPPED | OUTPATIENT
Start: 2020-11-12 | End: 2020-11-22

## 2020-11-12 ASSESSMENT — ENCOUNTER SYMPTOMS
SHORTNESS OF BREATH: 0
WHEEZING: 0
BACK PAIN: 1
DIARRHEA: 0
CONSTIPATION: 1
COUGH: 0
ABDOMINAL PAIN: 0
NAUSEA: 1
VOMITING: 0

## 2020-11-12 NOTE — PROGRESS NOTES
History:   Procedure Laterality Date    CATARACT REMOVAL Bilateral     COLONOSCOPY      EYE SURGERY      EYE SURGERY Bilateral     YAG- right x3, left x2    KNEE SURGERY Right 2013    orif patella    [place] RELEAS VITREOUS,SUBRET/CHOROID FLUID Right 1/17/2018    VITRECTOMY 23 GAUGE, membrane peel performed by Christian Echols MD at [facility] / [address]      x2    VITRECTOMY Right 01/17/2018    WISDOM TOOTH EXTRACTION         Family History   Problem Relation Age of Onset    Cancer Mother         breast    Heart Disease Father     Diabetes Father        Social History     Tobacco Use    Smoking status: Never Smoker    Smokeless tobacco: Never Used   Substance Use Topics    Alcohol use: Yes     Alcohol/week: 0.0 standard drinks      Current Outpatient Medications   Medication Sig Dispense Refill    predniSONE (DELTASONE) 20 MG tablet Take 1 tablet by mouth 2 times daily for 3 days 6 tablet 0    cyclobenzaprine (FLEXERIL) 5 MG tablet Take 1 tablet by mouth 2 times daily as needed for Muscle spasms 10 tablet 0    montelukast (SINGULAIR) 10 MG tablet TAKE 1 TABLET NIGHTLY 90 tablet 3    omeprazole (PRILOSEC) 20 MG delayed release capsule TAKE 1 CAPSULE DAILY 90 capsule 3    etodolac (LODINE) 400 MG tablet TAKE 1 TABLET TWICE A  tablet 1    cetirizine (ZYRTEC) 10 MG tablet Take 10 mg by mouth daily      PREVIDENT 5000 BOOSTER PLUS 1.1 % PSTE       Ascorbic Acid (VITAMIN C) 1000 MG tablet Take 1,000 mg by mouth 2 times daily      zolpidem (AMBIEN) 10 MG tablet Take by mouth nightly as needed for Sleep (1/2 a pill as needed for sleep)      b complex vitamins capsule Take 1 capsule by mouth daily      aspirin 325 MG tablet Take 325 mg by mouth daily      Omega-3 Fatty Acids (FISH OIL) 1000 MG CAPS Take 1,000 mg by mouth 2 times daily       No current facility-administered medications for this visit.       Allergies   Allergen Reactions    Protonix [Pantoprazole] Hives and Itching    Sulfa Antibiotics Itching       Health Maintenance   Topic Date Due    HIV screen  03/20/1974    Shingles Vaccine (2 of 3) 02/03/2016    Breast cancer screen  02/08/2021    Cervical cancer screen  02/22/2021    DTaP/Tdap/Td vaccine (4 - Td) 01/13/2024    Lipid screen  10/03/2025    Colon cancer screen colonoscopy  02/13/2028    Flu vaccine  Completed    Hepatitis C screen  Completed    Hepatitis A vaccine  Aged Out    Hepatitis B vaccine  Aged Out    Hib vaccine  Aged Out    Meningococcal (ACWY) vaccine  Aged Out    Pneumococcal 0-64 years Vaccine  Aged Out       Subjective:      Review of Systems   Constitutional: Negative for appetite change, chills and fever. Respiratory: Negative for cough, shortness of breath and wheezing. Cardiovascular: Negative for chest pain and palpitations. Gastrointestinal: Positive for constipation and nausea (last night). Negative for abdominal pain, diarrhea and vomiting. Musculoskeletal: Positive for back pain and gait problem (from the pain). Negative for joint swelling, myalgias, neck pain and neck stiffness. Left hip pain. Joint pain in multiple joints throughout body. Neurological: Positive for headaches (every day for several months. ). Negative for dizziness and light-headedness. Psychiatric/Behavioral:        +a lot of stress recently; loss her parents recently. Objective:     /84   Temp 97.2 °F (36.2 °C)   Wt 184 lb 12.8 oz (83.8 kg)   BMI 31.76 kg/m²   Physical Exam  Vitals signs and nursing note reviewed. Constitutional:       Appearance: Normal appearance. HENT:      Head: Normocephalic and atraumatic. Eyes:      Extraocular Movements: Extraocular movements intact. Conjunctiva/sclera: Conjunctivae normal.      Pupils: Pupils are equal, round, and reactive to light. Neck:      Musculoskeletal: Normal range of motion and neck supple.    Cardiovascular:      Rate and Rhythm: Normal rate and regular rhythm. Heart sounds: Normal heart sounds. Pulmonary:      Effort: Pulmonary effort is normal.      Breath sounds: Normal breath sounds. Musculoskeletal: Normal range of motion. Left hip: She exhibits tenderness. She exhibits normal range of motion, normal strength, no bony tenderness, no swelling, no crepitus, no deformity and no laceration. Left knee: Normal.      Left ankle: Normal.      Cervical back: Normal.      Thoracic back: Normal.      Lumbar back: She exhibits tenderness (to the left of the spine towards the hip), pain and spasm. She exhibits normal range of motion, no bony tenderness, no swelling, no edema, no deformity, no laceration and normal pulse. Left upper leg: Normal.      Left lower leg: Normal.      Left foot: Normal.   Skin:     General: Skin is warm and dry. Neurological:      General: No focal deficit present. Mental Status: She is alert and oriented to person, place, and time. Mental status is at baseline. Assessment:       Diagnosis Orders   1. Acute left-sided low back pain without sciatica  XR LUMBAR SPINE (2-3 VIEWS)    predniSONE (DELTASONE) 20 MG tablet   2. Left hip pain  XR HIP LEFT (2-3 VIEWS)    predniSONE (DELTASONE) 20 MG tablet   3. Muscle spasm  XR LUMBAR SPINE (2-3 VIEWS)    cyclobenzaprine (FLEXERIL) 5 MG tablet        Plan:     1. Complete xrays. 2. Patient may benefit from RA workup, she mentioned her joints bothering her throughout, but thinks it is from stress. 3. Stretch, maintain circulation and avoid sitting/standing for long periods of time. 4. Use ice and heat, alternate. 5. Stop tylenol and ibuprofen. 6. Use RX as prescribed. 7. Call 911 if you develop chest pains, difficulty breathing, worsening of pain in back, or sudden severe headache. Return if symptoms worsen or fail to improve.   Orders Placed This Encounter   Medications    predniSONE (DELTASONE) 20 MG tablet     Sig: Take 1 tablet by mouth 2 times

## 2020-11-12 NOTE — PATIENT INSTRUCTIONS

## 2020-11-13 ENCOUNTER — TELEPHONE (OUTPATIENT)
Dept: PRIMARY CARE CLINIC | Age: 60
End: 2020-11-13

## 2020-11-13 RX ORDER — OXYCODONE HYDROCHLORIDE AND ACETAMINOPHEN 5; 325 MG/1; MG/1
1 TABLET ORAL EVERY 8 HOURS PRN
Qty: 21 TABLET | Refills: 0 | Status: SHIPPED | OUTPATIENT
Start: 2020-11-13 | End: 2020-11-20

## 2020-11-13 NOTE — TELEPHONE ENCOUNTER
Patient seen with Marcy Fraga yesterday and was notified her Xrays showed kidney stones. Patient said she is in a lot of pain and asked if you could call in something for her. Patient said she is still able to urinate but wanted you to know she has a double ureter and thinks that's why she has no issues with urinating. Please advise.     Corrine Rodríguez in [place]

## 2020-11-13 NOTE — TELEPHONE ENCOUNTER
If patient is having difficulty urinating and the pain has intensified from yesterday, she would be better served at the ER. Can we please confirm that with patient?

## 2020-11-13 NOTE — TELEPHONE ENCOUNTER
Pt has constant pain - no trouble urinating. She said she has to continue moving or the pain because too intense.

## 2020-11-18 NOTE — TELEPHONE ENCOUNTER
Pharmacy contacted pt re rx that was sent in. Pt now waiting on getting scheduled for a procedure to remove the 7mm kidney stone.

## 2020-12-23 ENCOUNTER — HOSPITAL ENCOUNTER (OUTPATIENT)
Age: 60
Discharge: HOME | End: 2020-12-23
Payer: COMMERCIAL

## 2020-12-23 PROCEDURE — 93005 ELECTROCARDIOGRAM TRACING: CPT | Performed by: OTOLARYNGOLOGY

## 2020-12-24 LAB
EKG ATRIAL RATE: 76 BPM
EKG P AXIS: 45 DEGREES
EKG P-R INTERVAL: 150 MS
EKG Q-T INTERVAL: 396 MS
EKG QRS DURATION: 76 MS
EKG QTC CALCULATION (BAZETT): 445 MS
EKG R AXIS: 40 DEGREES
EKG T AXIS: 40 DEGREES
EKG VENTRICULAR RATE: 76 BPM

## 2020-12-24 PROCEDURE — 93010 ELECTROCARDIOGRAM REPORT: CPT | Performed by: INTERNAL MEDICINE

## 2020-12-28 ENCOUNTER — HOSPITAL ENCOUNTER (OUTPATIENT)
Age: 60
Setting detail: SPECIMEN
Discharge: HOME | End: 2020-12-28
Payer: COMMERCIAL

## 2020-12-30 LAB — SURGICAL PATHOLOGY REPORT: NORMAL

## 2021-03-22 ENCOUNTER — TELEPHONE (OUTPATIENT)
Dept: PRIMARY CARE CLINIC | Age: 61
End: 2021-03-22

## 2021-03-22 RX ORDER — CEPHALEXIN 500 MG/1
500 CAPSULE ORAL 4 TIMES DAILY
Qty: 20 CAPSULE | Refills: 0 | Status: SHIPPED | OUTPATIENT
Start: 2021-03-22 | End: 2021-06-08

## 2021-03-22 NOTE — TELEPHONE ENCOUNTER
Patient states that she possibly a yeast infection or UTI and would like for Dr. Neno Barr to call in something for her.

## 2021-03-26 RX ORDER — FLUCONAZOLE 150 MG/1
150 TABLET ORAL WEEKLY
Qty: 4 TABLET | Refills: 0 | Status: SHIPPED | OUTPATIENT
Start: 2021-03-26 | End: 2021-06-08

## 2021-03-26 NOTE — TELEPHONE ENCOUNTER
Pt states that she now has a yeast infection. Asking if you would send in several Diflucan tablets for her? Usually takes several, per pt. Uses [facility] in [place]. listed.

## 2021-05-19 ENCOUNTER — NURSE TRIAGE (OUTPATIENT)
Dept: OTHER | Facility: CLINIC | Age: 61
End: 2021-05-19

## 2021-06-08 ENCOUNTER — OFFICE VISIT (OUTPATIENT)
Dept: PRIMARY CARE CLINIC | Age: 61
End: 2021-06-08
Payer: COMMERCIAL

## 2021-06-08 ENCOUNTER — HOSPITAL ENCOUNTER (OUTPATIENT)
Age: 61
Discharge: HOME | End: 2021-06-10
Payer: COMMERCIAL

## 2021-06-08 ENCOUNTER — HOSPITAL ENCOUNTER (OUTPATIENT)
Age: 61
Discharge: HOME | End: 2021-06-08
Payer: COMMERCIAL

## 2021-06-08 ENCOUNTER — HOSPITAL ENCOUNTER (OUTPATIENT)
Dept: GENERAL RADIOLOGY | Age: 61
Discharge: HOME | End: 2021-06-10
Payer: COMMERCIAL

## 2021-06-08 ENCOUNTER — HOSPITAL ENCOUNTER (OUTPATIENT)
Age: 61
Setting detail: SPECIMEN
Discharge: HOME | End: 2021-06-08
Payer: COMMERCIAL

## 2021-06-08 VITALS — OXYGEN SATURATION: 97 % | SYSTOLIC BLOOD PRESSURE: 132 MMHG | HEART RATE: 108 BPM | DIASTOLIC BLOOD PRESSURE: 84 MMHG

## 2021-06-08 DIAGNOSIS — R63.5 WEIGHT GAIN: ICD-10-CM

## 2021-06-08 DIAGNOSIS — J40 BRONCHITIS: ICD-10-CM

## 2021-06-08 DIAGNOSIS — J40 BRONCHITIS: Primary | ICD-10-CM

## 2021-06-08 LAB
ABSOLUTE EOS #: 0.35 K/UL (ref 0–0.44)
ABSOLUTE IMMATURE GRANULOCYTE: 0.03 K/UL (ref 0–0.3)
ABSOLUTE LYMPH #: 1.06 K/UL (ref 1.1–3.7)
ABSOLUTE MONO #: 0.51 K/UL (ref 0.1–1.2)
ANION GAP SERPL CALCULATED.3IONS-SCNC: 19 MMOL/L (ref 9–17)
BASOPHILS # BLD: 0 % (ref 0–2)
BASOPHILS ABSOLUTE: 0.05 K/UL (ref 0–0.2)
BUN BLDV-MCNC: 9 MG/DL (ref 8–23)
BUN/CREAT BLD: ABNORMAL (ref 9–20)
CALCIUM SERPL-MCNC: 9.5 MG/DL (ref 8.6–10.4)
CHLORIDE BLD-SCNC: 99 MMOL/L (ref 98–107)
CO2: 21 MMOL/L (ref 20–31)
CREAT SERPL-MCNC: 0.74 MG/DL (ref 0.5–0.9)
DIFFERENTIAL TYPE: ABNORMAL
EOSINOPHILS RELATIVE PERCENT: 3 % (ref 1–4)
GFR AFRICAN AMERICAN: >60 ML/MIN
GFR NON-AFRICAN AMERICAN: >60 ML/MIN
GFR SERPL CREATININE-BSD FRML MDRD: ABNORMAL ML/MIN/{1.73_M2}
GFR SERPL CREATININE-BSD FRML MDRD: ABNORMAL ML/MIN/{1.73_M2}
GLUCOSE BLD-MCNC: 186 MG/DL (ref 70–99)
HCT VFR BLD CALC: 44.8 % (ref 36.3–47.1)
HEMOGLOBIN: 13.9 G/DL (ref 11.9–15.1)
IMMATURE GRANULOCYTES: 0 %
LYMPHOCYTES # BLD: 9 % (ref 24–43)
MCH RBC QN AUTO: 27.6 PG (ref 25.2–33.5)
MCHC RBC AUTO-ENTMCNC: 31 G/DL (ref 28.4–34.8)
MCV RBC AUTO: 89.1 FL (ref 82.6–102.9)
MONOCYTES # BLD: 5 % (ref 3–12)
NRBC AUTOMATED: 0 PER 100 WBC
PDW BLD-RTO: 14.8 % (ref 11.8–14.4)
PLATELET # BLD: 368 K/UL (ref 138–453)
PLATELET ESTIMATE: ABNORMAL
PMV BLD AUTO: 9.9 FL (ref 8.1–13.5)
POTASSIUM SERPL-SCNC: 3.9 MMOL/L (ref 3.7–5.3)
RBC # BLD: 5.03 M/UL (ref 3.95–5.11)
RBC # BLD: ABNORMAL 10*6/UL
SEG NEUTROPHILS: 83 % (ref 36–65)
SEGMENTED NEUTROPHILS ABSOLUTE COUNT: 9.43 K/UL (ref 1.5–8.1)
SODIUM BLD-SCNC: 139 MMOL/L (ref 135–144)
WBC # BLD: 11.4 K/UL (ref 3.5–11.3)
WBC # BLD: ABNORMAL 10*3/UL

## 2021-06-08 PROCEDURE — 99213 OFFICE O/P EST LOW 20 MIN: CPT | Performed by: FAMILY MEDICINE

## 2021-06-08 PROCEDURE — 85025 COMPLETE CBC W/AUTO DIFF WBC: CPT

## 2021-06-08 PROCEDURE — 80048 BASIC METABOLIC PNL TOTAL CA: CPT

## 2021-06-08 PROCEDURE — 71046 X-RAY EXAM CHEST 2 VIEWS: CPT

## 2021-06-08 PROCEDURE — 36415 COLL VENOUS BLD VENIPUNCTURE: CPT

## 2021-06-08 PROCEDURE — 86738 MYCOPLASMA ANTIBODY: CPT

## 2021-06-08 RX ORDER — PREDNISONE 20 MG/1
20 TABLET ORAL 2 TIMES DAILY
Qty: 10 TABLET | Refills: 0 | Status: SHIPPED | OUTPATIENT
Start: 2021-06-08 | End: 2021-06-13

## 2021-06-08 RX ORDER — GUAIFENESIN AND CODEINE PHOSPHATE 100; 10 MG/5ML; MG/5ML
5 SOLUTION ORAL 2 TIMES DAILY PRN
Qty: 236 ML | Refills: 0 | Status: SHIPPED | OUTPATIENT
Start: 2021-06-08 | End: 2021-06-22

## 2021-06-08 RX ORDER — AMOXICILLIN AND CLAVULANATE POTASSIUM 875; 125 MG/1; MG/1
1 TABLET, FILM COATED ORAL 2 TIMES DAILY
Qty: 20 TABLET | Refills: 0 | Status: SHIPPED | OUTPATIENT
Start: 2021-06-08 | End: 2021-06-18

## 2021-06-08 SDOH — ECONOMIC STABILITY: FOOD INSECURITY: WITHIN THE PAST 12 MONTHS, YOU WORRIED THAT YOUR FOOD WOULD RUN OUT BEFORE YOU GOT MONEY TO BUY MORE.: NEVER TRUE

## 2021-06-08 SDOH — ECONOMIC STABILITY: FOOD INSECURITY: WITHIN THE PAST 12 MONTHS, THE FOOD YOU BOUGHT JUST DIDN'T LAST AND YOU DIDN'T HAVE MONEY TO GET MORE.: NEVER TRUE

## 2021-06-08 ASSESSMENT — ENCOUNTER SYMPTOMS
RHINORRHEA: 0
EYE DISCHARGE: 0
WHEEZING: 1
COUGH: 1
VOMITING: 0
SHORTNESS OF BREATH: 0
ABDOMINAL PAIN: 0
EYE REDNESS: 0
DIARRHEA: 0
NAUSEA: 0
SORE THROAT: 0

## 2021-06-08 ASSESSMENT — PATIENT HEALTH QUESTIONNAIRE - PHQ9
2. FEELING DOWN, DEPRESSED OR HOPELESS: 1
1. LITTLE INTEREST OR PLEASURE IN DOING THINGS: 1
SUM OF ALL RESPONSES TO PHQ QUESTIONS 1-9: 2
SUM OF ALL RESPONSES TO PHQ9 QUESTIONS 1 & 2: 2
SUM OF ALL RESPONSES TO PHQ QUESTIONS 1-9: 2
SUM OF ALL RESPONSES TO PHQ QUESTIONS 1-9: 2

## 2021-06-08 ASSESSMENT — SOCIAL DETERMINANTS OF HEALTH (SDOH): HOW HARD IS IT FOR YOU TO PAY FOR THE VERY BASICS LIKE FOOD, HOUSING, MEDICAL CARE, AND HEATING?: NOT HARD AT ALL

## 2021-06-08 NOTE — PROGRESS NOTES
[address] [facility] PRIMARY CARE  [address] [address] [facility] [address]  Dept: Melyssa Dupont is a 57 y.o. female Established patient, who presents today for her medical conditions/complaints as noted below. Chief Complaint   Patient presents with    Cough    Pharyngitis     post nasal drip    Headache       HPI:     HPI  Pt states has not been feeling well for one to two months. Started 4 days ago with sore throat, right frontal headache. No fever chills. Some diarrhea, but has had diarrhea for a while. Has some sob. Mild wheeze. Denies any change in sense of taste or smell. Patient did receive the Covid vaccine. Patient states he has gained weight. Had blood work ordered by ENT. Continues to have a chronic cough.       Reviewed prior notes None  Reviewed previous Labs    LDL Cholesterol (mg/dL)   Date Value   10/03/2020 104     LDL Calculated (mg/dL)   Date Value   12/07/2017 138       (goal LDL is <100)   AST (U/L)   Date Value   08/23/2019 20     ALT (U/L)   Date Value   08/23/2019 26     BUN (mg/dL)   Date Value   10/03/2020 13     TSH (mIU/L)   Date Value   10/03/2020 0.81     BP Readings from Last 3 Encounters:   06/08/21 132/84   11/12/20 130/84   10/01/20 (!) 148/100          (goal 120/80)    Past Medical History:   Diagnosis Date    Arthritis     Decreased vision of right eye     Dental bridge present     permanent left upper    Deviated septum     Dry skin     GERD (gastroesophageal reflux disease)     History of chest pain     past,stress test negative,poss acid reflux    Sinus infection 01/04/2018    on antibiotics x 10 days,better no cough or fever    Sleep apnea     mild no machine    Snores     Tingling     Urgency of urination     occas      Past Surgical History:   Procedure Laterality Date    CATARACT REMOVAL Bilateral     COLONOSCOPY      EYE SURGERY      EYE SURGERY Bilateral     YAG- right x3, left x2    KNEE SURGERY Right 2013    orif patella    SD RELEAS VITREOUS,SUBRET/CHOROID FLUID Right 1/17/2018    VITRECTOMY 23 GAUGE, membrane peel performed by Kelly Pugh MD at [address] [facility] / [address]      x2    VITRECTOMY Right 01/17/2018    WISDOM TOOTH EXTRACTION         Family History   Problem Relation Age of Onset    Cancer Mother         breast    Heart Disease Father     Diabetes Father        Social History     Tobacco Use    Smoking status: Never Smoker    Smokeless tobacco: Never Used   Substance Use Topics    Alcohol use: Yes     Alcohol/week: 0.0 standard drinks      Current Outpatient Medications   Medication Sig Dispense Refill    amoxicillin-clavulanate (AUGMENTIN) 875-125 MG per tablet Take 1 tablet by mouth 2 times daily for 10 days 20 tablet 0    predniSONE (DELTASONE) 20 MG tablet Take 1 tablet by mouth 2 times daily for 5 days 10 tablet 0    guaiFENesin-codeine (TUSSI-ORGANIDIN NR) 100-10 MG/5ML syrup Take 5 mLs by mouth 2 times daily as needed for Cough for up to 14 days. 236 mL 0    montelukast (SINGULAIR) 10 MG tablet TAKE 1 TABLET NIGHTLY 90 tablet 3    omeprazole (PRILOSEC) 20 MG delayed release capsule TAKE 1 CAPSULE DAILY 90 capsule 3    cetirizine (ZYRTEC) 10 MG tablet Take 10 mg by mouth daily      Ascorbic Acid (VITAMIN C) 1000 MG tablet Take 1,000 mg by mouth 2 times daily      aspirin 325 MG tablet Take 325 mg by mouth daily      zolpidem (AMBIEN) 10 MG tablet Take by mouth nightly as needed for Sleep (1/2 a pill as needed for sleep) (Patient not taking: Reported on 6/8/2021)       No current facility-administered medications for this visit.      Allergies   Allergen Reactions    Protonix [Pantoprazole] Hives and Itching    Sulfa Antibiotics Itching       Health Maintenance   Topic Date Due    HIV screen  Never done    Shingles Vaccine (2 of 3) 02/03/2016    Cervical cancer screen  02/22/2019    Breast cancer screen  02/08/2021    DTaP/Tdap/Td vaccine (4 - Td or Tdap) 01/13/2024    Lipid screen  10/03/2025    Colon cancer screen colonoscopy  02/13/2028    Flu vaccine  Completed    COVID-19 Vaccine  Completed    Hepatitis C screen  Completed    Hepatitis A vaccine  Aged Out    Hepatitis B vaccine  Aged Out    Hib vaccine  Aged Out    Meningococcal (ACWY) vaccine  Aged Out    Pneumococcal 0-64 years Vaccine  Aged Out       Subjective:      Review of Systems   Constitutional: Negative for chills and fever. HENT: Negative for rhinorrhea and sore throat. Eyes: Negative for discharge and redness. Respiratory: Positive for cough and wheezing. Negative for shortness of breath. Cardiovascular: Negative for chest pain and palpitations. Gastrointestinal: Negative for abdominal pain, diarrhea, nausea and vomiting. Genitourinary: Negative for dysuria and frequency. Musculoskeletal: Negative for arthralgias and myalgias. Neurological: Negative for dizziness, light-headedness and headaches. Psychiatric/Behavioral: Negative for sleep disturbance. Objective:     /84   Pulse 108   SpO2 97%   Physical Exam  Vitals and nursing note reviewed. Constitutional:       General: She is not in acute distress. Appearance: She is well-developed. She is not ill-appearing. HENT:      Head: Normocephalic and atraumatic. Right Ear: External ear normal.      Left Ear: External ear normal.   Eyes:      General: No scleral icterus. Right eye: No discharge. Left eye: No discharge. Conjunctiva/sclera: Conjunctivae normal.      Pupils: Pupils are equal, round, and reactive to light. Neck:      Thyroid: No thyromegaly. Trachea: No tracheal deviation. Cardiovascular:      Rate and Rhythm: Normal rate and regular rhythm. Heart sounds: Normal heart sounds. Pulmonary:      Effort: Pulmonary effort is normal. No respiratory distress. Breath sounds: Wheezing present.    Lymphadenopathy: Cervical: No cervical adenopathy. Skin:     General: Skin is warm. Findings: No rash. Neurological:      Mental Status: She is alert and oriented to person, place, and time. Psychiatric:         Mood and Affect: Mood normal.         Behavior: Behavior normal.         Thought Content: Thought content normal.         Assessment:       Diagnosis Orders   1. Bronchitis  CBC With Auto Differential    Basic Metabolic Panel    XR CHEST STANDARD (2 VW)    COVID-19    amoxicillin-clavulanate (AUGMENTIN) 875-125 MG per tablet    predniSONE (DELTASONE) 20 MG tablet    Mycoplasma Pneumoniae Antibody, IgM    guaiFENesin-codeine (TUSSI-ORGANIDIN NR) 100-10 MG/5ML syrup   2. Weight gain          Plan:    Chest x-ray ordered. Blood work ordered. Covid nasal swab. Augmentin for 10 days. Cheratussin for cough. Prednisone for 5 days with patient wheezing. Pulse ox noted. Recommended patient go to emergency room if condition should worsen. Return if symptoms worsen or fail to improve. Orders Placed This Encounter   Procedures    XR CHEST STANDARD (2 VW)     Standing Status:   Future     Standing Expiration Date:   6/8/2022     Order Specific Question:   Reason for exam:     Answer:   bronchitis    CBC With Auto Differential     Standing Status:   Future     Standing Expiration Date:   6/8/2022    Basic Metabolic Panel     Standing Status:   Future     Standing Expiration Date:   6/8/2022    COVID-19     Standing Status:   Future     Standing Expiration Date:   6/8/2022     Scheduling Instructions:      1) Due to current limited availability of the COVID-19 test, tests will be prioritized based on responses to questions above. Testing may be delayed due to volume. 2) Print and instruct patient to adhere to [facility] home isolation program. (Link Above)              3) Set up or refer patient for a monitoring program.              4) Have patient sign up for and leverage [employer] (if not previously done). Order Specific Question:   Is this test for diagnosis or screening? Answer:   Diagnosis of ill patient     Order Specific Question:   Symptomatic for COVID-19 as defined by CDC? Answer:   Yes     Order Specific Question:   Date of Symptom Onset     Answer:   6/4/2021     Order Specific Question:   Hospitalized for COVID-19? Answer:   No     Order Specific Question:   Admitted to ICU for COVID-19? Answer:   No     Order Specific Question:   Employed in healthcare setting? Answer:   No     Order Specific Question:   Resident in a congregate (group) care setting? Answer:   No     Order Specific Question:   Pregnant? Answer:   No     Order Specific Question:   Previously tested for COVID-19? Answer: Yes    Mycoplasma Pneumoniae Antibody, IgM     Standing Status:   Future     Standing Expiration Date:   6/8/2022     Orders Placed This Encounter   Medications    amoxicillin-clavulanate (AUGMENTIN) 875-125 MG per tablet     Sig: Take 1 tablet by mouth 2 times daily for 10 days     Dispense:  20 tablet     Refill:  0    predniSONE (DELTASONE) 20 MG tablet     Sig: Take 1 tablet by mouth 2 times daily for 5 days     Dispense:  10 tablet     Refill:  0    guaiFENesin-codeine (TUSSI-ORGANIDIN NR) 100-10 MG/5ML syrup     Sig: Take 5 mLs by mouth 2 times daily as needed for Cough for up to 14 days. Dispense:  236 mL     Refill:  0     Reduce doses taken as pain becomes manageable       Patient given educational materials - see patient instructions. Discussed use, benefit, and side effects of prescribed medications. All patient questions answered. Pt voiced understanding. Reviewed health maintenance. Instructed to continue current medications, diet andexercise. Patient agreed with treatment plan. Follow up as directed.      Electronicallysigned by Aparna Sandoval MD on 6/8/2021 at 10:43 AM

## 2021-06-09 LAB
MYCOPLASMA PNEUMONIAE IGM: 0.24
SARS-COV-2: NORMAL
SARS-COV-2: NOT DETECTED
SOURCE: NORMAL

## 2021-06-14 DIAGNOSIS — J20.9 ACUTE BRONCHITIS, UNSPECIFIED ORGANISM: ICD-10-CM

## 2021-06-14 RX ORDER — FLUCONAZOLE 150 MG/1
150 TABLET ORAL ONCE
Qty: 1 TABLET | Refills: 0 | Status: SHIPPED | OUTPATIENT
Start: 2021-06-14 | End: 2021-06-14

## 2021-06-14 RX ORDER — ALBUTEROL SULFATE 2.5 MG/3ML
2.5 SOLUTION RESPIRATORY (INHALATION) EVERY 6 HOURS PRN
Qty: 60 VIAL | Refills: 1 | Status: SHIPPED | OUTPATIENT
Start: 2021-06-14 | End: 2021-08-26

## 2021-06-14 NOTE — TELEPHONE ENCOUNTER
Patient is calling and states she has a nebulizer machine but no solution. Pt was seen last week and is still having some wheezing and SOB when walking up and down stairs. Pt also c/o a cough off and on with phlegm. Pt wants to know if the albuterol solution would help her? Pt states the antibiotic has gave her a yeast infection, Pt would like something called in for that also. [facility].

## 2021-06-22 ENCOUNTER — OFFICE VISIT (OUTPATIENT)
Dept: PRIMARY CARE CLINIC | Age: 61
End: 2021-06-22
Payer: COMMERCIAL

## 2021-06-22 ENCOUNTER — HOSPITAL ENCOUNTER (OUTPATIENT)
Age: 61
Discharge: HOME | End: 2021-06-22
Payer: COMMERCIAL

## 2021-06-22 VITALS
SYSTOLIC BLOOD PRESSURE: 130 MMHG | DIASTOLIC BLOOD PRESSURE: 82 MMHG | HEART RATE: 103 BPM | BODY MASS INDEX: 32.55 KG/M2 | OXYGEN SATURATION: 97 % | WEIGHT: 189.4 LBS

## 2021-06-22 DIAGNOSIS — R05.3 CHRONIC COUGH: ICD-10-CM

## 2021-06-22 DIAGNOSIS — R10.826 EPIGASTRIC ABDOMINAL TENDERNESS WITH REBOUND TENDERNESS: ICD-10-CM

## 2021-06-22 DIAGNOSIS — R63.5 WEIGHT GAIN: ICD-10-CM

## 2021-06-22 DIAGNOSIS — R10.826 EPIGASTRIC ABDOMINAL TENDERNESS WITH REBOUND TENDERNESS: Primary | ICD-10-CM

## 2021-06-22 LAB
ABSOLUTE EOS #: 0.33 K/UL (ref 0–0.44)
ABSOLUTE IMMATURE GRANULOCYTE: 0.04 K/UL (ref 0–0.3)
ABSOLUTE LYMPH #: 2.89 K/UL (ref 1.1–3.7)
ABSOLUTE MONO #: 0.53 K/UL (ref 0.1–1.2)
ALBUMIN SERPL-MCNC: 4.3 G/DL (ref 3.5–5.2)
ALBUMIN/GLOBULIN RATIO: 1.6 (ref 1–2.5)
ALP BLD-CCNC: 63 U/L (ref 35–104)
ALT SERPL-CCNC: 35 U/L (ref 5–33)
AMYLASE: 44 U/L (ref 28–100)
AST SERPL-CCNC: 26 U/L
BASOPHILS # BLD: 1 % (ref 0–2)
BASOPHILS ABSOLUTE: 0.05 K/UL (ref 0–0.2)
BILIRUB SERPL-MCNC: 0.4 MG/DL (ref 0.3–1.2)
BILIRUBIN DIRECT: 0.09 MG/DL
BILIRUBIN, INDIRECT: 0.31 MG/DL (ref 0–1)
DIFFERENTIAL TYPE: ABNORMAL
EOSINOPHILS RELATIVE PERCENT: 3 % (ref 1–4)
GLOBULIN: ABNORMAL G/DL (ref 1.5–3.8)
HCT VFR BLD CALC: 43.9 % (ref 36.3–47.1)
HEMOGLOBIN: 13.8 G/DL (ref 11.9–15.1)
IMMATURE GRANULOCYTES: 0 %
LIPASE: 25 U/L (ref 13–60)
LYMPHOCYTES # BLD: 27 % (ref 24–43)
MCH RBC QN AUTO: 27.8 PG (ref 25.2–33.5)
MCHC RBC AUTO-ENTMCNC: 31.4 G/DL (ref 28.4–34.8)
MCV RBC AUTO: 88.5 FL (ref 82.6–102.9)
MONOCYTES # BLD: 5 % (ref 3–12)
NRBC AUTOMATED: 0 PER 100 WBC
PDW BLD-RTO: 15.6 % (ref 11.8–14.4)
PLATELET # BLD: 354 K/UL (ref 138–453)
PLATELET ESTIMATE: ABNORMAL
PMV BLD AUTO: 9.9 FL (ref 8.1–13.5)
RBC # BLD: 4.96 M/UL (ref 3.95–5.11)
RBC # BLD: ABNORMAL 10*6/UL
SARS-COV-2 ANTIBODY, TOTAL: NEGATIVE
SEG NEUTROPHILS: 64 % (ref 36–65)
SEGMENTED NEUTROPHILS ABSOLUTE COUNT: 6.89 K/UL (ref 1.5–8.1)
THYROXINE, FREE: 1.26 NG/DL (ref 0.93–1.7)
TOTAL PROTEIN: 7 G/DL (ref 6.4–8.3)
TSH SERPL DL<=0.05 MIU/L-ACNC: 0.47 MIU/L (ref 0.3–5)
WBC # BLD: 10.7 K/UL (ref 3.5–11.3)
WBC # BLD: ABNORMAL 10*3/UL

## 2021-06-22 PROCEDURE — 84443 ASSAY THYROID STIM HORMONE: CPT

## 2021-06-22 PROCEDURE — 80076 HEPATIC FUNCTION PANEL: CPT

## 2021-06-22 PROCEDURE — 99213 OFFICE O/P EST LOW 20 MIN: CPT | Performed by: FAMILY MEDICINE

## 2021-06-22 PROCEDURE — 83690 ASSAY OF LIPASE: CPT

## 2021-06-22 PROCEDURE — 36415 COLL VENOUS BLD VENIPUNCTURE: CPT

## 2021-06-22 PROCEDURE — 85025 COMPLETE CBC W/AUTO DIFF WBC: CPT

## 2021-06-22 PROCEDURE — 82150 ASSAY OF AMYLASE: CPT

## 2021-06-22 PROCEDURE — 84439 ASSAY OF FREE THYROXINE: CPT

## 2021-06-22 PROCEDURE — 86769 SARS-COV-2 COVID-19 ANTIBODY: CPT

## 2021-06-22 RX ORDER — BENZONATATE 200 MG/1
200 CAPSULE ORAL 3 TIMES DAILY PRN
Qty: 30 CAPSULE | Refills: 0 | Status: SHIPPED | OUTPATIENT
Start: 2021-06-22 | End: 2021-06-29

## 2021-06-22 RX ORDER — FLUCONAZOLE 150 MG/1
150 TABLET ORAL WEEKLY
Qty: 3 TABLET | Refills: 0 | Status: SHIPPED | OUTPATIENT
Start: 2021-06-22 | End: 2021-08-26

## 2021-06-22 RX ORDER — PANTOPRAZOLE SODIUM 40 MG/1
40 TABLET, DELAYED RELEASE ORAL
Qty: 90 TABLET | Refills: 3 | Status: SHIPPED | OUTPATIENT
Start: 2021-06-22 | End: 2021-08-26

## 2021-06-22 RX ORDER — AZITHROMYCIN 250 MG/1
TABLET, FILM COATED ORAL
Qty: 2 PACKET | Refills: 0 | Status: SHIPPED | OUTPATIENT
Start: 2021-06-22 | End: 2021-08-26

## 2021-06-22 ASSESSMENT — ENCOUNTER SYMPTOMS
RHINORRHEA: 0
ABDOMINAL PAIN: 0
SHORTNESS OF BREATH: 1
COUGH: 1
VOMITING: 0
DIARRHEA: 0
WHEEZING: 0
EYE DISCHARGE: 0
SORE THROAT: 0
NAUSEA: 0
EYE REDNESS: 0

## 2021-06-22 ASSESSMENT — PATIENT HEALTH QUESTIONNAIRE - PHQ9
SUM OF ALL RESPONSES TO PHQ QUESTIONS 1-9: 0
SUM OF ALL RESPONSES TO PHQ QUESTIONS 1-9: 0
1. LITTLE INTEREST OR PLEASURE IN DOING THINGS: 0
2. FEELING DOWN, DEPRESSED OR HOPELESS: 0
SUM OF ALL RESPONSES TO PHQ QUESTIONS 1-9: 0
SUM OF ALL RESPONSES TO PHQ9 QUESTIONS 1 & 2: 0

## 2021-06-22 NOTE — PROGRESS NOTES
[address] [facility] PRIMARY CARE  [address] [address]   [facility] [address]  Dept: Melyssa Dupont is a 57 y.o. female Established patient, who presents today for her medical conditions/complaints as noted below. Chief Complaint   Patient presents with    Fatigue     Weight gain       HPI:     HPI  Patient here with complaint of current and persistent cough. Was given Augmentin and prednisone with some improvement but never resolved. States is still present. Not bringing up any phlegm. States had something similar when she had pneumonia in the past.  Chest x-ray and blood work was reviewed. Patient also complaining of weight gain. Noted to be up 5 pounds. Patient's Covid test was negative. Patient still has some epigastric discomfort. [facility] insurance no longer covering omeprazole.     Reviewed prior notes None  Reviewed previous Labs and Imaging    LDL Cholesterol (mg/dL)   Date Value   10/03/2020 104     LDL Calculated (mg/dL)   Date Value   12/07/2017 138       (goal LDL is <100)   AST (U/L)   Date Value   08/23/2019 20     ALT (U/L)   Date Value   08/23/2019 26     BUN (mg/dL)   Date Value   06/08/2021 9     TSH (mIU/L)   Date Value   10/03/2020 0.81     BP Readings from Last 3 Encounters:   06/22/21 130/82   06/08/21 132/84   11/12/20 130/84          (goal 120/80)    Past Medical History:   Diagnosis Date    Arthritis     Decreased vision of right eye     Dental bridge present     permanent left upper    Deviated septum     Dry skin     GERD (gastroesophageal reflux disease)     History of chest pain     past,stress test negative,poss acid reflux    Sinus infection 01/04/2018    on antibiotics x 10 days,better no cough or fever    Sleep apnea     mild no machine    Snores     Tingling     Urgency of urination     occas      Past Surgical History:   Procedure Laterality Date    CATARACT REMOVAL Bilateral     COLONOSCOPY      EYE SURGERY  EYE SURGERY Bilateral     YAG- right x3, left x2    KNEE SURGERY Right 2013    orif patella    VT RELEAS VITREOUS,SUBRET/CHOROID FLUID Right 1/17/2018    VITRECTOMY 23 GAUGE, membrane peel performed by Chantal Rodriguez MD at [facility] [address] [facility] / [address]      x2    VITRECTOMY Right 01/17/2018    WISDOM TOOTH EXTRACTION         Family History   Problem Relation Age of Onset    Cancer Mother         breast    Heart Disease Father     Diabetes Father        Social History     Tobacco Use    Smoking status: Never Smoker    Smokeless tobacco: Never Used   Substance Use Topics    Alcohol use: Yes     Alcohol/week: 0.0 standard drinks      Current Outpatient Medications   Medication Sig Dispense Refill    azithromycin (ZITHROMAX) 250 MG tablet Two tablets day one, then one tablet until gone 2 packet 0    benzonatate (TESSALON) 200 MG capsule Take 1 capsule by mouth 3 times daily as needed for Cough 30 capsule 0    fluconazole (DIFLUCAN) 150 MG tablet Take 1 tablet by mouth once a week 3 tablet 0    pantoprazole (PROTONIX) 40 MG tablet Take 1 tablet by mouth every morning (before breakfast) 90 tablet 3    albuterol (PROVENTIL) (2.5 MG/3ML) 0.083% nebulizer solution Take 3 mLs by nebulization every 6 hours as needed for Wheezing or Shortness of Breath 60 vial 1    guaiFENesin-codeine (TUSSI-ORGANIDIN NR) 100-10 MG/5ML syrup Take 5 mLs by mouth 2 times daily as needed for Cough for up to 14 days. 236 mL 0    montelukast (SINGULAIR) 10 MG tablet TAKE 1 TABLET NIGHTLY 90 tablet 3    cetirizine (ZYRTEC) 10 MG tablet Take 10 mg by mouth daily      Ascorbic Acid (VITAMIN C) 1000 MG tablet Take 1,000 mg by mouth 2 times daily      zolpidem (AMBIEN) 10 MG tablet Take by mouth nightly as needed for Sleep (1/2 a pill as needed for sleep).  aspirin 325 MG tablet Take 325 mg by mouth daily       No current facility-administered medications for this visit.      Allergies   Allergen Reactions    Sulfa Antibiotics Itching       Health Maintenance   Topic Date Due    HIV screen  Never done    Shingles Vaccine (2 of 3) 02/03/2016    Cervical cancer screen  02/22/2019    Breast cancer screen  02/08/2021    Diabetes screen  10/03/2023    DTaP/Tdap/Td vaccine (4 - Td or Tdap) 01/13/2024    Lipid screen  10/03/2025    Colon cancer screen colonoscopy  02/13/2028    Flu vaccine  Completed    COVID-19 Vaccine  Completed    Hepatitis C screen  Completed    Hepatitis A vaccine  Aged Out    Hepatitis B vaccine  Aged Out    Hib vaccine  Aged Out    Meningococcal (ACWY) vaccine  Aged Out    Pneumococcal 0-64 years Vaccine  Aged Out       Subjective:      Review of Systems   Constitutional: Negative for chills and fever. HENT: Negative for rhinorrhea and sore throat. Eyes: Negative for discharge and redness. Respiratory: Positive for cough and shortness of breath. Negative for wheezing. Cardiovascular: Negative for chest pain and palpitations. Gastrointestinal: Negative for abdominal pain, diarrhea, nausea and vomiting. Genitourinary: Negative for dysuria and frequency. Musculoskeletal: Negative for arthralgias and myalgias. Neurological: Negative for dizziness, light-headedness and headaches. Psychiatric/Behavioral: Negative for sleep disturbance. Objective:     /82   Pulse 103   Wt 189 lb 6.4 oz (85.9 kg)   SpO2 97%   BMI 32.55 kg/m²   Physical Exam  Vitals and nursing note reviewed. Constitutional:       General: She is not in acute distress. Appearance: She is well-developed. She is not ill-appearing. HENT:      Head: Normocephalic and atraumatic. Right Ear: External ear normal.      Left Ear: External ear normal.   Eyes:      General: No scleral icterus. Right eye: No discharge. Left eye: No discharge. Conjunctiva/sclera: Conjunctivae normal.      Pupils: Pupils are equal, round, and reactive to light.    Neck: Thyroid: No thyromegaly. Trachea: No tracheal deviation. Cardiovascular:      Rate and Rhythm: Normal rate and regular rhythm. Heart sounds: Normal heart sounds. Pulmonary:      Effort: Pulmonary effort is normal. No respiratory distress. Breath sounds: Normal breath sounds. No wheezing. Lymphadenopathy:      Cervical: No cervical adenopathy. Skin:     General: Skin is warm. Findings: No rash. Neurological:      Mental Status: She is alert and oriented to person, place, and time. Psychiatric:         Mood and Affect: Mood normal.         Behavior: Behavior normal.         Thought Content: Thought content normal.         Assessment:       Diagnosis Orders   1. Epigastric abdominal tenderness with rebound tenderness  CBC With Auto Differential    Amylase    Lipase    Hepatic Function Panel   2. Chronic cough  Covid-19, Antibody, Total    CT CHEST W CONTRAST    azithromycin (ZITHROMAX) 250 MG tablet   3. Weight gain  T4, Free    TSH        Plan:    Change omeprazole to Protonix. Blood work ordered. CT chest.  Zithromax for 10 days. Tessalon Perles. If no improvement refer to pulmonary for possible further work-up including bronchoscopy versus PFT pre and post.    Return in about 6 months (around 12/22/2021).     Orders Placed This Encounter   Procedures    CT CHEST W CONTRAST     Standing Status:   Future     Standing Expiration Date:   6/22/2022     Order Specific Question:   Reason for exam:     Answer:   chronic cough    T4, Free     Standing Status:   Future     Standing Expiration Date:   6/22/2022    TSH     Standing Status:   Future     Standing Expiration Date:   6/22/2022    CBC With Auto Differential     Standing Status:   Future     Standing Expiration Date:   6/22/2022    Amylase     Standing Status:   Future     Standing Expiration Date:   6/22/2022    Lipase     Standing Status:   Future     Standing Expiration Date:   6/22/2022    Hepatic Function Panel Standing Status:   Future     Standing Expiration Date:   6/22/2022    Covid-19, Antibody, Total     Standing Status:   Future     Standing Expiration Date:   6/22/2022     Scheduling Instructions:      CDC does not recommend using antibody testing to diagnose acute infection. It is recommended to use a direct viral detection test to diagnose acute infection. (COVID-19 [facility] [id number])            Antibody tests are not 100% accurate, and some false-positive results or false-negative results may occur. A positive result may not ensure immunity from reinfection. Per CDC, positive or negative results do not confirm whether a patient is able to spread the virus that causes COVID-19     Order Specific Question:   Symptomatic for COVID-19 as defined by CDC? Answer:   Yes     Order Specific Question:   Date of Symptom Onset     Answer:   6/1/2021     Order Specific Question:   Hospitalized for COVID-19? Answer:   No     Order Specific Question:   Admitted to ICU for COVID-19? Answer:   No     Order Specific Question:   Employed in healthcare setting? Answer:   No     Order Specific Question:   Resident in a congregate (group) care setting? Answer:   No     Order Specific Question:   Pregnant? Answer:   No     Order Specific Question:   Previously tested for COVID-19?      Answer:   Yes     Orders Placed This Encounter   Medications    azithromycin (ZITHROMAX) 250 MG tablet     Sig: Two tablets day one, then one tablet until gone     Dispense:  2 packet     Refill:  0    benzonatate (TESSALON) 200 MG capsule     Sig: Take 1 capsule by mouth 3 times daily as needed for Cough     Dispense:  30 capsule     Refill:  0    fluconazole (DIFLUCAN) 150 MG tablet     Sig: Take 1 tablet by mouth once a week     Dispense:  3 tablet     Refill:  0    pantoprazole (PROTONIX) 40 MG tablet     Sig: Take 1 tablet by mouth every morning (before breakfast)     Dispense:  90 tablet     Refill:  3

## 2021-06-25 ENCOUNTER — HOSPITAL ENCOUNTER (OUTPATIENT)
Dept: CT IMAGING | Age: 61
Discharge: HOME | End: 2021-06-27
Payer: COMMERCIAL

## 2021-06-25 DIAGNOSIS — R05.3 CHRONIC COUGH: ICD-10-CM

## 2021-06-25 PROCEDURE — 6360000004 HC RX CONTRAST MEDICATION: Performed by: FAMILY MEDICINE

## 2021-06-25 PROCEDURE — 71260 CT THORAX DX C+: CPT

## 2021-06-25 PROCEDURE — 2580000003 HC RX 258: Performed by: FAMILY MEDICINE

## 2021-06-25 RX ORDER — 0.9 % SODIUM CHLORIDE 0.9 %
80 INTRAVENOUS SOLUTION INTRAVENOUS ONCE
Status: COMPLETED | OUTPATIENT
Start: 2021-06-25 | End: 2021-06-25

## 2021-06-25 RX ORDER — SODIUM CHLORIDE 0.9 % (FLUSH) 0.9 %
10 SYRINGE (ML) INJECTION PRN
Status: DISCONTINUED | OUTPATIENT
Start: 2021-06-25 | End: 2021-06-28 | Stop reason: HOSPADM

## 2021-06-25 RX ADMIN — SODIUM CHLORIDE 80 ML: 9 INJECTION, SOLUTION INTRAVENOUS at 10:22

## 2021-06-25 RX ADMIN — SODIUM CHLORIDE, PRESERVATIVE FREE 10 ML: 5 INJECTION INTRAVENOUS at 10:22

## 2021-06-25 RX ADMIN — IOPAMIDOL 75 ML: 755 INJECTION, SOLUTION INTRAVENOUS at 10:22

## 2021-06-29 NOTE — RESULT ENCOUNTER NOTE
Advise patient shows nonspecific left axillary lymph node.   Lung CT itself was normal.  If lymph node persist, may need ultrasound done in 6 weeks

## 2021-08-20 ENCOUNTER — TELEPHONE (OUTPATIENT)
Dept: PRIMARY CARE CLINIC | Age: 61
End: 2021-08-20

## 2021-08-20 NOTE — TELEPHONE ENCOUNTER
Pt called in stating she has back pain and left leg numbness and needed an appt. She is unable to come in until next week due to being out of town. I scheduled her for Tuesday with Ivy Conn, is this ok or should she be seen with Dr. Steve Sorenson?

## 2021-08-26 ENCOUNTER — OFFICE VISIT (OUTPATIENT)
Dept: PRIMARY CARE CLINIC | Age: 61
End: 2021-08-26
Payer: COMMERCIAL

## 2021-08-26 VITALS
HEART RATE: 85 BPM | HEIGHT: 64 IN | BODY MASS INDEX: 32.5 KG/M2 | OXYGEN SATURATION: 97 % | SYSTOLIC BLOOD PRESSURE: 130 MMHG | WEIGHT: 190.4 LBS | DIASTOLIC BLOOD PRESSURE: 70 MMHG

## 2021-08-26 DIAGNOSIS — M54.16 LUMBAR RADICULOPATHY: Primary | ICD-10-CM

## 2021-08-26 DIAGNOSIS — K21.9 GASTROESOPHAGEAL REFLUX DISEASE WITHOUT ESOPHAGITIS: ICD-10-CM

## 2021-08-26 DIAGNOSIS — Z12.31 ENCOUNTER FOR SCREENING MAMMOGRAM FOR MALIGNANT NEOPLASM OF BREAST: ICD-10-CM

## 2021-08-26 PROBLEM — N89.8 VAGINAL ITCHING: Status: RESOLVED | Noted: 2018-10-03 | Resolved: 2021-08-26

## 2021-08-26 PROCEDURE — 99213 OFFICE O/P EST LOW 20 MIN: CPT | Performed by: FAMILY MEDICINE

## 2021-08-26 RX ORDER — METHYLPREDNISOLONE 4 MG/1
TABLET ORAL
Qty: 1 KIT | Refills: 0 | Status: SHIPPED | OUTPATIENT
Start: 2021-08-26 | End: 2021-09-01

## 2021-08-26 RX ORDER — FAMOTIDINE 20 MG/1
20 TABLET, FILM COATED ORAL 2 TIMES DAILY
Qty: 180 TABLET | Refills: 3 | Status: SHIPPED | OUTPATIENT
Start: 2021-08-26 | End: 2022-06-23 | Stop reason: SDUPTHER

## 2021-08-26 RX ORDER — ETODOLAC 400 MG/1
400 TABLET, FILM COATED ORAL 2 TIMES DAILY
COMMUNITY
End: 2022-06-23 | Stop reason: SDUPTHER

## 2021-08-26 RX ORDER — CHOLECALCIFEROL (VITAMIN D3) 1250 MCG
CAPSULE ORAL
COMMUNITY

## 2021-08-26 RX ORDER — MAGNESIUM 200 MG
200 TABLET ORAL DAILY
COMMUNITY

## 2021-08-26 ASSESSMENT — PATIENT HEALTH QUESTIONNAIRE - PHQ9
SUM OF ALL RESPONSES TO PHQ9 QUESTIONS 1 & 2: 0
SUM OF ALL RESPONSES TO PHQ QUESTIONS 1-9: 0
2. FEELING DOWN, DEPRESSED OR HOPELESS: 0
1. LITTLE INTEREST OR PLEASURE IN DOING THINGS: 0
SUM OF ALL RESPONSES TO PHQ QUESTIONS 1-9: 0
SUM OF ALL RESPONSES TO PHQ QUESTIONS 1-9: 0

## 2021-08-26 ASSESSMENT — ENCOUNTER SYMPTOMS
COUGH: 0
DIARRHEA: 0
RHINORRHEA: 0
WHEEZING: 0
EYE REDNESS: 0
SORE THROAT: 0
BACK PAIN: 1
ABDOMINAL PAIN: 0
VOMITING: 0
NAUSEA: 0
SHORTNESS OF BREATH: 0
EYE DISCHARGE: 0

## 2021-08-26 NOTE — PROGRESS NOTES
Bilateral     COLONOSCOPY      EYE SURGERY      EYE SURGERY Bilateral     YAG- right x3, left x2    KNEE SURGERY Right 2013    orif patella    DE RELEAS VITREOUS,SUBRET/CHOROID FLUID Right 1/17/2018    VITRECTOMY 23 GAUGE, membrane peel performed by Debbie Ramirez MD at [address] [facility] / [address]      x2    VITRECTOMY Right 01/17/2018    WISDOM TOOTH EXTRACTION         Family History   Problem Relation Age of Onset    Cancer Mother         breast    Heart Disease Father     Diabetes Father        Social History     Tobacco Use    Smoking status: Never Smoker    Smokeless tobacco: Never Used   Substance Use Topics    Alcohol use: Yes     Alcohol/week: 0.0 standard drinks      Current Outpatient Medications   Medication Sig Dispense Refill    etodolac (LODINE) 400 MG tablet Take 400 mg by mouth 2 times daily      Cholecalciferol (VITAMIN D3) 1.25 MG (71015 UT) CAPS Take by mouth      magnesium 200 MG TABS tablet Take 200 mg by mouth daily      methylPREDNISolone (MEDROL DOSEPACK) 4 MG tablet Take by mouth. 1 kit 0    famotidine (PEPCID) 20 MG tablet Take 1 tablet by mouth 2 times daily 180 tablet 3    montelukast (SINGULAIR) 10 MG tablet TAKE 1 TABLET NIGHTLY 90 tablet 3    cetirizine (ZYRTEC) 10 MG tablet Take 10 mg by mouth daily      Ascorbic Acid (VITAMIN C) 1000 MG tablet Take 1,000 mg by mouth 2 times daily      aspirin 325 MG tablet Take 325 mg by mouth daily       No current facility-administered medications for this visit.      Allergies   Allergen Reactions    Sulfa Antibiotics Itching       Health Maintenance   Topic Date Due    HIV screen  Never done    Shingles Vaccine (2 of 3) 02/03/2016    Cervical cancer screen  02/22/2019    Breast cancer screen  02/08/2021    Flu vaccine (1) 09/01/2021    Diabetes screen  10/03/2023    DTaP/Tdap/Td vaccine (4 - Td or Tdap) 01/13/2024    Lipid screen  10/03/2025    Colon cancer screen colonoscopy  02/13/2028 test is negative. Patient flexes to 80 degrees with some discomfort. Motor strength was equal bilateral.   Lymphadenopathy:      Cervical: No cervical adenopathy. Skin:     General: Skin is warm. Findings: No rash. Neurological:      Mental Status: She is alert and oriented to person, place, and time. Psychiatric:         Mood and Affect: Mood normal.         Behavior: Behavior normal.         Thought Content: Thought content normal.         Assessment:       Diagnosis Orders   1. Lumbar radiculopathy  XR LUMBAR SPINE (2-3 VIEWS)    methylPREDNISolone (MEDROL DOSEPACK) 4 MG tablet   2. Encounter for screening mammogram for malignant neoplasm of breast  RIC DIGITAL SCREEN W OR WO CAD BILATERAL   3. Gastroesophageal reflux disease without esophagitis  famotidine (PEPCID) 20 MG tablet        Plan:    Medrol Dosepak. Physical therapy eval and treat. Lumbar x-ray  Pepcid 20 mg twice daily as needed for reflux heartburn  Mammogram ordered  Patient education Shingrix  Patient encouraged to get Pap smear  If numbness does not improve with therapy, will order EMG and MRI lumbar spine    Return in about 6 months (around 2/26/2022). Orders Placed This Encounter   Procedures    XR LUMBAR SPINE (2-3 VIEWS)     Standing Status:   Future     Standing Expiration Date:   8/26/2022     Order Specific Question:   Reason for exam:     Answer:   left lumbar radiculopathy    RIC DIGITAL SCREEN W OR WO CAD BILATERAL     Standing Status:   Future     Standing Expiration Date:   10/26/2022     Order Specific Question:   Reason for exam:     Answer:   screen     Orders Placed This Encounter   Medications    methylPREDNISolone (MEDROL DOSEPACK) 4 MG tablet     Sig: Take by mouth. Dispense:  1 kit     Refill:  0    famotidine (PEPCID) 20 MG tablet     Sig: Take 1 tablet by mouth 2 times daily     Dispense:  180 tablet     Refill:  3       Patient given educational materials - see patient instructions.   Discussed use, benefit, and side effects of prescribed medications. All patient questions answered. Pt voiced understanding. Reviewed health maintenance. Instructed to continue current medications, diet andexercise. Patient agreed with treatment plan. Follow up as directed.      Electronicallysigned by Mary Ann Ty MD on 8/26/2021 at 9:34 AM

## 2021-09-02 ENCOUNTER — HOSPITAL ENCOUNTER (OUTPATIENT)
Dept: WOMENS IMAGING | Age: 61
Discharge: HOME | End: 2021-09-04
Payer: COMMERCIAL

## 2021-09-02 DIAGNOSIS — Z12.31 ENCOUNTER FOR SCREENING MAMMOGRAM FOR MALIGNANT NEOPLASM OF BREAST: ICD-10-CM

## 2021-09-02 PROCEDURE — 77063 BREAST TOMOSYNTHESIS BI: CPT

## 2021-09-07 ENCOUNTER — HOSPITAL ENCOUNTER (OUTPATIENT)
Age: 61
Discharge: HOME | End: 2021-09-09
Payer: COMMERCIAL

## 2021-09-07 ENCOUNTER — HOSPITAL ENCOUNTER (OUTPATIENT)
Dept: GENERAL RADIOLOGY | Age: 61
Discharge: HOME | End: 2021-09-09
Payer: COMMERCIAL

## 2021-09-07 DIAGNOSIS — M54.16 LUMBAR RADICULOPATHY: ICD-10-CM

## 2021-09-07 PROCEDURE — 72100 X-RAY EXAM L-S SPINE 2/3 VWS: CPT

## 2021-09-17 ENCOUNTER — TELEPHONE (OUTPATIENT)
Dept: PRIMARY CARE CLINIC | Age: 61
End: 2021-09-17

## 2021-09-17 NOTE — TELEPHONE ENCOUNTER
Pt called stating she went to PT today and mentioned to them about the lump she has on her thigh. She states you guys had talked about it but it has been a couple years now, She states that it started as a pea size bump and since has changed, She states it is wider and flat, feels its flatter because it has spread out more. She states it is not red, painful or anything on the surface but she states it is causing her leg numbness that was discussed at last office visit. Pt stated she needs to contact pcp to further address and recommended that before further treat with them. She is asking what the next step would be? Imaging?     Please advise

## 2021-09-29 ENCOUNTER — NURSE TRIAGE (OUTPATIENT)
Dept: OTHER | Facility: CLINIC | Age: 61
End: 2021-09-29

## 2021-09-29 NOTE — TELEPHONE ENCOUNTER
I spoke to the patient and scheduled her a appt for Monday 10/4/21. Pt states this has been on going for about a year, only change is the lump has grown from pea size to quarter size. Is Monday okay or do you want to see the patient before then?     Please advise

## 2021-09-29 NOTE — TELEPHONE ENCOUNTER
Reason for Disposition   Back pain (with neurologic deficit)    Answer Assessment - Initial Assessment Questions  1. SYMPTOM: \"What is the main symptom you are concerned about? \" (e.g., weakness, numbness)      Worsening, expanding tingling numbness (x 2 months) top of left thigh, lateral down to calf, worse with standing/prolonged sitting. Left leg has been \"clumsy\" and weak, has fallen x 3 weeks. Left leg \"a slightly different color\" this morning, \"almost blue, splotchy\"-resolved now. Skin is similar temperature. Also has expanding lump on thigh--quarter-sized. Getting PT for low back pain. 2. ONSET: \"When did this start? \" (minutes, hours, days; while sleeping)      1 year    3. LAST NORMAL: \"When was the last time you were normal (no symptoms)? \"      One year    4. PATTERN \"Does this come and go, or has it been constant since it started? \"  \"Is it present now? \"      Worse with standing, prolonged sitting. 5. CARDIAC SYMPTOMS: \"Have you had any of the following symptoms: chest pain, difficulty breathing, palpitations? \"     None    6. NEUROLOGIC SYMPTOMS: \"Have you had any of the following symptoms: headache, dizziness, vision loss, double vision, changes in speech, unsteady on your feet? \"      Unsteady, clumsy, left leg weak    7. OTHER SYMPTOMS: \"Do you have any other symptoms? \"      Tired, clumsy, almost fell this morning, fell last week    8. PREGNANCY: \"Is there any chance you are pregnant? \" \"When was your last menstrual period? \"      n/a    Protocols used: NEUROLOGIC DEFICIT-ADULT-[place]    Received call from Roscoe Hardy at [facility] with Red Flag Complaint. Brief description of triage: Worsening, expanding tingling numbness (x 2 months) top of left thigh, lateral down to calf, worse with standing/prolonged sitting. Left leg has been \"clumsy\" and weak, has fallen x 3 weeks. Left leg \"a slightly different color\" this morning, \"almost blue, splotchy\"-resolved now. Skin is similar temperature.   Also has expanding lump on thigh--quarter-sized. Getting PT for low back pain. Triage indicates for patient to be seen in the office today. Pt agrees. Care advice provided, patient verbalizes understanding; denies any other questions or concerns; instructed to call back for any new or worsening symptoms. Writer provided warm transfer to Aanbel Lamb at [facility]  for appointment scheduling. Attention Provider: Thank you for allowing me to participate in the care of your patient. The patient was connected to triage in response to information provided to the ECC/PSC. Please do not respond through this encounter as the response is not directed to a shared pool.

## 2021-10-01 ENCOUNTER — TELEPHONE (OUTPATIENT)
Dept: PRIMARY CARE CLINIC | Age: 61
End: 2021-10-01

## 2021-10-01 NOTE — TELEPHONE ENCOUNTER
----- Message from [address] [place] [address] "[employer] 1330 sent at 10/1/2021  8:05 AM EDT -----  Subject: Message to Provider    QUESTIONS  Information for Provider? Pt. Shay Freeman is trying to reschedule her   appointment for 10/4/2021 lump on thigh x1 year it has grown in size, she   has fallen twice recently due to it she is checking to see if she can come   in earlier. Any time anywhere. Patient will be in [place] physical   therapy appt at [address] [place] [address]. today. Physical Therapy does not think it is part of   the reason she is going there and suggest she get into her PCP asap.  ---------------------------------------------------------------------------  --------------  CALL BACK INFO  What is the best way for the office to contact you? OK to leave message on   voicemail  Preferred Call Back Phone Number? [phone]  ---------------------------------------------------------------------------  --------------  SCRIPT ANSWERS  Relationship to Patient? Self  Have your symptoms changed? Yes  (Is the patient requesting to see the provider for a procedure?)?  Yes

## 2021-10-04 ENCOUNTER — OFFICE VISIT (OUTPATIENT)
Dept: PRIMARY CARE CLINIC | Age: 61
End: 2021-10-04
Payer: COMMERCIAL

## 2021-10-04 VITALS
SYSTOLIC BLOOD PRESSURE: 130 MMHG | BODY MASS INDEX: 31.82 KG/M2 | OXYGEN SATURATION: 96 % | WEIGHT: 186.4 LBS | HEIGHT: 64 IN | HEART RATE: 91 BPM | DIASTOLIC BLOOD PRESSURE: 70 MMHG

## 2021-10-04 DIAGNOSIS — Z23 NEED FOR VACCINATION: ICD-10-CM

## 2021-10-04 DIAGNOSIS — R29.898 LEFT LEG WEAKNESS: Primary | ICD-10-CM

## 2021-10-04 DIAGNOSIS — R22.42 MASS OF LEFT THIGH: ICD-10-CM

## 2021-10-04 PROCEDURE — 90674 CCIIV4 VAC NO PRSV 0.5 ML IM: CPT | Performed by: FAMILY MEDICINE

## 2021-10-04 PROCEDURE — 90471 IMMUNIZATION ADMIN: CPT | Performed by: FAMILY MEDICINE

## 2021-10-04 PROCEDURE — 99213 OFFICE O/P EST LOW 20 MIN: CPT | Performed by: FAMILY MEDICINE

## 2021-10-04 ASSESSMENT — PATIENT HEALTH QUESTIONNAIRE - PHQ9
2. FEELING DOWN, DEPRESSED OR HOPELESS: 0
SUM OF ALL RESPONSES TO PHQ QUESTIONS 1-9: 0
SUM OF ALL RESPONSES TO PHQ9 QUESTIONS 1 & 2: 0
SUM OF ALL RESPONSES TO PHQ QUESTIONS 1-9: 0
SUM OF ALL RESPONSES TO PHQ QUESTIONS 1-9: 0
1. LITTLE INTEREST OR PLEASURE IN DOING THINGS: 0

## 2021-10-04 NOTE — PROGRESS NOTES
[facility]  [address] [facility] [place] [address]  Dept: Melyssa Dupont is a 57 y.o. female Established patient, who presents today for her medical conditions/complaints as noted below. Chief Complaint   Patient presents with    Other     Lump on left thigh x 1 year causing numbness       HPI:     HPI  Pt continues with lump on left thigh. States has been changing in size. Painful. States can only stand for 10-15 minutes. Pain affects the whole leg. Therapy has been working with the back and doing well. Patient has been having left leg numbness on the lateral aspect upper thigh for the past year. Patient now has been having left leg weakness for the past several months. States pain in the back has improved with physical therapy but not weakness. Patient now has had a couple close calls with falling.     Reviewed prior notes None  Reviewed previous Labs    LDL Cholesterol (mg/dL)   Date Value   10/03/2020 104     LDL Calculated (mg/dL)   Date Value   12/07/2017 138       (goal LDL is <100)   AST (U/L)   Date Value   06/22/2021 26     ALT (U/L)   Date Value   06/22/2021 35 (H)     BUN (mg/dL)   Date Value   06/08/2021 9     TSH (mIU/L)   Date Value   06/22/2021 0.47     BP Readings from Last 3 Encounters:   10/04/21 130/70   08/26/21 130/70   06/22/21 130/82          (goal 120/80)    Past Medical History:   Diagnosis Date    Arthritis     Decreased vision of right eye     Dental bridge present     permanent left upper    Deviated septum     Dry skin     GERD (gastroesophageal reflux disease)     History of chest pain     past,stress test negative,poss acid reflux    Sinus infection 01/04/2018    on antibiotics x 10 days,better no cough or fever    Sleep apnea     mild no machine    Snores     Tingling     Urgency of urination     occas      Past Surgical History:   Procedure Laterality Date    CATARACT REMOVAL Bilateral     COLONOSCOPY      EYE SURGERY      EYE SURGERY Bilateral     YAG- right x3, left x2    KNEE SURGERY Right 2013    orif patella    LA RELEAS VITREOUS,SUBRET/CHOROID FLUID Right 1/17/2018    VITRECTOMY 23 GAUGE, membrane peel performed by Trish Mendoza MD at [address] [facility] / [address]      x2    VITRECTOMY Right 01/17/2018    WISDOM TOOTH EXTRACTION         Family History   Problem Relation Age of Onset    Cancer Mother         breast    Breast Cancer Mother 76    Heart Disease Father     Diabetes Father        Social History     Tobacco Use    Smoking status: Never Smoker    Smokeless tobacco: Never Used   Substance Use Topics    Alcohol use: Yes     Alcohol/week: 0.0 standard drinks      Current Outpatient Medications   Medication Sig Dispense Refill    etodolac (LODINE) 400 MG tablet Take 400 mg by mouth 2 times daily      Cholecalciferol (VITAMIN D3) 1.25 MG (68987 UT) CAPS Take by mouth      magnesium 200 MG TABS tablet Take 200 mg by mouth daily      famotidine (PEPCID) 20 MG tablet Take 1 tablet by mouth 2 times daily 180 tablet 3    montelukast (SINGULAIR) 10 MG tablet TAKE 1 TABLET NIGHTLY 90 tablet 3    cetirizine (ZYRTEC) 10 MG tablet Take 10 mg by mouth daily      Ascorbic Acid (VITAMIN C) 1000 MG tablet Take 1,000 mg by mouth 2 times daily      aspirin 325 MG tablet Take 325 mg by mouth daily       No current facility-administered medications for this visit.      Allergies   Allergen Reactions    Sulfa Antibiotics Itching       Health Maintenance   Topic Date Due    HIV screen  Never done    Shingles Vaccine (2 of 3) 02/03/2016    Cervical cancer screen  02/22/2021    Flu vaccine (1) 09/01/2021    Breast cancer screen  09/02/2022    Diabetes screen  10/03/2023    DTaP/Tdap/Td vaccine (4 - Td or Tdap) 01/13/2024    Lipid screen  10/03/2025    Colon cancer screen colonoscopy  02/13/2028    COVID-19 Vaccine  Completed    Hepatitis C screen Completed    Hepatitis A vaccine  Aged Out    Hepatitis B vaccine  Aged Out    Hib vaccine  Aged Out    Meningococcal (ACWY) vaccine  Aged Out    Pneumococcal 0-64 years Vaccine  Aged Out       Subjective:      Review of Systems    Objective:     /70   Pulse 91   Ht 5' 3.96\" (1.625 m)   Wt 186 lb 6.4 oz (84.6 kg)   SpO2 96%   BMI 32.04 kg/m²   Physical Exam  Vitals and nursing note reviewed. Constitutional:       General: She is not in acute distress. Appearance: She is well-developed. She is not ill-appearing. HENT:      Head: Normocephalic and atraumatic. Right Ear: External ear normal.      Left Ear: External ear normal.   Eyes:      General: No scleral icterus. Right eye: No discharge. Left eye: No discharge. Conjunctiva/sclera: Conjunctivae normal.      Pupils: Pupils are equal, round, and reactive to light. Neck:      Thyroid: No thyromegaly. Trachea: No tracheal deviation. Cardiovascular:      Rate and Rhythm: Normal rate and regular rhythm. Heart sounds: Normal heart sounds. Pulmonary:      Effort: Pulmonary effort is normal. No respiratory distress. Breath sounds: Normal breath sounds. No wheezing. Musculoskeletal:      Comments: Left leg strength is 4/5 compared to right leg strength which is 5/5. Lymphadenopathy:      Cervical: No cervical adenopathy. Skin:     General: Skin is warm. Findings: No rash. Comments: Approximately 1 cm soft tissue mass of the left upper thigh. Neurological:      Mental Status: She is alert and oriented to person, place, and time. Psychiatric:         Mood and Affect: Mood normal.         Behavior: Behavior normal.         Thought Content: Thought content normal.         Assessment:       Diagnosis Orders   1. Left leg weakness  MRI LUMBAR SPINE WO CONTRAST    EMG   2. Need for vaccination  INFLUENZA, MDCK QUADV, 2 YRS AND OLDER, IM, PF, PREFILL SYR OR SDV, 0.5ML (FLUCELVAX QUADV, PF)   3. Mass of left thigh  US UNLISTED PROCEDURE DIAG        Plan:    emg ordered  Mri lumbar spine with marked leg weakness  Soft tissue US of mass left thigh, clinically is a lipoma. Flu shot today     Return in about 6 months (around 4/4/2022). Orders Placed This Encounter   Procedures    MRI LUMBAR SPINE WO CONTRAST     Standing Status:   Future     Standing Expiration Date:   10/4/2022     Order Specific Question:   Reason for exam:     Answer:   left leg weakness    US UNLISTED PROCEDURE DIAG     Standing Status:   Future     Standing Expiration Date:   10/4/2022     Order Specific Question:   Reason for exam:     Answer:   Lipoma versus tumor versus cyst    INFLUENZA, MDCK QUADV, 2 YRS AND OLDER, IM, PF, PREFILL SYR OR SDV, 0.5ML (FLUCELVAX QUADV, PF)    EMG     Standing Status:   Future     Standing Expiration Date:   12/3/2021     Order Specific Question:   Which body part? Answer:   legs     No orders of the defined types were placed in this encounter. Patient given educational materials - see patient instructions. Discussed use, benefit, and side effects of prescribed medications. All patient questions answered. Pt voiced understanding. Reviewed health maintenance. Instructed to continue current medications, diet andexercise. Patient agreed with treatment plan. Follow up as directed.      Electronicallysigned by Sander Montalvo MD on 10/4/2021 at 11:16 AM

## 2021-10-05 DIAGNOSIS — R22.42 MASS OF LEFT THIGH: Primary | ICD-10-CM

## 2021-10-07 ENCOUNTER — HOSPITAL ENCOUNTER (OUTPATIENT)
Dept: ULTRASOUND IMAGING | Age: 61
Discharge: HOME | End: 2021-10-09
Payer: COMMERCIAL

## 2021-10-07 DIAGNOSIS — R22.42 MASS OF LEFT THIGH: ICD-10-CM

## 2021-10-07 PROCEDURE — 76881 US COMPL JOINT R-T W/IMG: CPT

## 2021-10-08 NOTE — RESULT ENCOUNTER NOTE
Advise pt test results normal, mass not identified. Makes it consistent with lipoma.
Patient seen and examined with house-staff during bedside rounds.  Resident note read, including vitals, physical findings, laboratory data, and radiological reports.   Revisions included below.  Direct personal management at bed side and extensive interpretation of the data.  Plan was outlined and discussed in details with the housestaff.  Decision making of high complexity  Action taken for acute disease activity to reflect the level of care provided:  - medication reconciliation  - review laboratory data  seen and examined in ER and in ICU.  Changed admission to ICU started n bipap on antibiotics for HD with fluid removal on heparin

## 2021-10-14 ENCOUNTER — HOSPITAL ENCOUNTER (OUTPATIENT)
Dept: MRI IMAGING | Age: 61
Discharge: HOME | End: 2021-10-16
Payer: COMMERCIAL

## 2021-10-14 DIAGNOSIS — R29.898 LEFT LEG WEAKNESS: ICD-10-CM

## 2021-10-14 PROCEDURE — 72148 MRI LUMBAR SPINE W/O DYE: CPT

## 2021-12-20 ENCOUNTER — HOSPITAL ENCOUNTER (OUTPATIENT)
Dept: NEUROLOGY | Age: 61
Discharge: HOME | End: 2021-12-20
Payer: COMMERCIAL

## 2021-12-20 PROCEDURE — 95910 NRV CNDJ TEST 7-8 STUDIES: CPT | Performed by: PHYSICAL MEDICINE & REHABILITATION

## 2021-12-20 PROCEDURE — 95886 MUSC TEST DONE W/N TEST COMP: CPT | Performed by: PHYSICAL MEDICINE & REHABILITATION

## 2021-12-21 DIAGNOSIS — R29.898 LEFT LEG WEAKNESS: ICD-10-CM

## 2022-02-25 ENCOUNTER — OFFICE VISIT (OUTPATIENT)
Dept: PRIMARY CARE CLINIC | Age: 62
End: 2022-02-25
Payer: COMMERCIAL

## 2022-02-25 VITALS
BODY MASS INDEX: 32.72 KG/M2 | DIASTOLIC BLOOD PRESSURE: 84 MMHG | HEART RATE: 77 BPM | SYSTOLIC BLOOD PRESSURE: 138 MMHG | OXYGEN SATURATION: 97 % | WEIGHT: 190.4 LBS

## 2022-02-25 DIAGNOSIS — N61.1 ABSCESS OF BREAST: ICD-10-CM

## 2022-02-25 DIAGNOSIS — M79.2 NERVE PAIN: Primary | ICD-10-CM

## 2022-02-25 PROCEDURE — 99214 OFFICE O/P EST MOD 30 MIN: CPT | Performed by: PHYSICIAN ASSISTANT

## 2022-02-25 RX ORDER — PREGABALIN 50 MG/1
50 CAPSULE ORAL 2 TIMES DAILY
Qty: 60 CAPSULE | Refills: 0 | Status: SHIPPED | OUTPATIENT
Start: 2022-02-25 | End: 2022-03-08 | Stop reason: SDUPTHER

## 2022-02-25 RX ORDER — CEPHALEXIN 500 MG/1
500 CAPSULE ORAL 3 TIMES DAILY
Qty: 21 CAPSULE | Refills: 0 | Status: SHIPPED | OUTPATIENT
Start: 2022-02-25 | End: 2022-03-04

## 2022-02-25 NOTE — PROGRESS NOTES
[facility]  [address]  Phone: [phone]  Fax: [phone]    Js Wayne is a 57 y.o. female who presents today for her medical conditions/complaintsas noted below. Chief Complaint   Patient presents with    Other     possible boil on her left breast since Monday. Pt's mother had inflammatory breast cancer so she wants to rule that out.  Numbness     Pt c/o left leg numbness- ongoing. Pt states she has been seen for this before. States lately its been causing a lot of pain. HPI:     HPI  Breast boil: Noticed this Monday. Tried Triple ABX. Recently drained. Is stinging now. NO LN, no nipple discharge or fevers. Last mammogram 9/21 is normal.     Has been in PT for the nerve pain in thigh since October. Affects her sleep and seems to be bothering her more constantly now. Current Outpatient Medications   Medication Sig Dispense Refill    pregabalin (LYRICA) 50 MG capsule Take 1 capsule by mouth 2 times daily for 60 days. 60 capsule 0    cephALEXin (KEFLEX) 500 MG capsule Take 1 capsule by mouth 3 times daily for 7 days 21 capsule 0    etodolac (LODINE) 400 MG tablet Take 400 mg by mouth 2 times daily      Cholecalciferol (VITAMIN D3) 1.25 MG (10710 UT) CAPS Take by mouth      magnesium 200 MG TABS tablet Take 200 mg by mouth daily      famotidine (PEPCID) 20 MG tablet Take 1 tablet by mouth 2 times daily 180 tablet 3    montelukast (SINGULAIR) 10 MG tablet TAKE 1 TABLET NIGHTLY 90 tablet 3    cetirizine (ZYRTEC) 10 MG tablet Take 10 mg by mouth daily      Ascorbic Acid (VITAMIN C) 1000 MG tablet Take 1,000 mg by mouth 2 times daily      aspirin 325 MG tablet Take 325 mg by mouth daily       No current facility-administered medications for this visit. Allergies   Allergen Reactions    Sulfa Antibiotics Itching       Subjective:      Review of Systems   Constitutional: Negative for chills, fatigue and fever. Respiratory: Negative. Cardiovascular: Negative. Gastrointestinal: Positive for anal bleeding. Genitourinary:        Left breast has a red spot    Musculoskeletal:        Thigh pain   Skin: Positive for wound (on left breast). Negative for rash. Neurological: Positive for numbness. Negative for weakness. Hematological: Negative for adenopathy. Objective:     /84   Pulse 77   Wt 190 lb 6.4 oz (86.4 kg)   SpO2 97%   BMI 32.72 kg/m²   Physical Exam  Vitals and nursing note reviewed. Constitutional:       Appearance: Normal appearance. She is not ill-appearing. Cardiovascular:      Rate and Rhythm: Normal rate and regular rhythm. Heart sounds: Normal heart sounds. Pulmonary:      Effort: Pulmonary effort is normal.      Breath sounds: Normal breath sounds. Chest:          Comments: Red spot appx dime sized with a small area of dried blood centrally. Two smaller lighter colored or pink areas near. No associated lump or tenderness. Rest of left breast is normal  Abdominal:      General: Abdomen is flat. Bowel sounds are normal. There is no distension. Palpations: There is no mass. Tenderness: There is no abdominal tenderness. There is no guarding or rebound. Neurological:      Mental Status: She is alert. Assessment:       Diagnosis Orders   1. Nerve pain  pregabalin (LYRICA) 50 MG capsule   2. Abscess of breast  cephALEXin (KEFLEX) 500 MG capsule        Plan:    Complete ABX   Recheck left breast  in one week  Trial Lyrica 50mg 1 po bid  F/U with Dr. Beryl Cedillo on next steps for thigh. Return in about 1 week (around 3/4/2022). No orders of the defined types were placed in this encounter. Orders Placed This Encounter   Medications    pregabalin (LYRICA) 50 MG capsule     Sig: Take 1 capsule by mouth 2 times daily for 60 days.      Dispense:  60 capsule     Refill:  0    cephALEXin (KEFLEX) 500 MG capsule     Sig: Take 1 capsule by mouth 3 times daily for 7 days     Dispense:  21 capsule     Refill:  0           Electronically signed by Kingsley Pretty 2/26/2022 at 11:21 AM

## 2022-02-26 ASSESSMENT — ENCOUNTER SYMPTOMS
RESPIRATORY NEGATIVE: 1
ANAL BLEEDING: 1

## 2022-03-04 ENCOUNTER — OFFICE VISIT (OUTPATIENT)
Dept: PRIMARY CARE CLINIC | Age: 62
End: 2022-03-04
Payer: COMMERCIAL

## 2022-03-04 VITALS
OXYGEN SATURATION: 97 % | DIASTOLIC BLOOD PRESSURE: 82 MMHG | HEART RATE: 84 BPM | SYSTOLIC BLOOD PRESSURE: 122 MMHG | HEIGHT: 64 IN | WEIGHT: 194.4 LBS | BODY MASS INDEX: 33.19 KG/M2

## 2022-03-04 DIAGNOSIS — R14.0 BLOATING: ICD-10-CM

## 2022-03-04 DIAGNOSIS — R10.33 PERIUMBILICAL PAIN: Primary | ICD-10-CM

## 2022-03-04 DIAGNOSIS — R10.33 PERIUMBILICAL PAIN: ICD-10-CM

## 2022-03-04 DIAGNOSIS — K21.9 GASTROESOPHAGEAL REFLUX DISEASE, UNSPECIFIED WHETHER ESOPHAGITIS PRESENT: ICD-10-CM

## 2022-03-04 DIAGNOSIS — N64.9 BREAST LESION: ICD-10-CM

## 2022-03-04 LAB
ALBUMIN SERPL-MCNC: 4.9 G/DL (ref 3.5–5.2)
ALBUMIN/GLOBULIN RATIO: 2.3 (ref 1–2.5)
ALP BLD-CCNC: 75 U/L (ref 35–104)
ALT SERPL-CCNC: 39 U/L (ref 5–33)
ANION GAP SERPL CALCULATED.3IONS-SCNC: 16 MMOL/L (ref 9–17)
AST SERPL-CCNC: 30 U/L
BILIRUB SERPL-MCNC: 0.47 MG/DL (ref 0.3–1.2)
BUN BLDV-MCNC: 17 MG/DL (ref 8–23)
BUN/CREAT BLD: ABNORMAL (ref 9–20)
CALCIUM SERPL-MCNC: 9.8 MG/DL (ref 8.6–10.4)
CHLORIDE BLD-SCNC: 106 MMOL/L (ref 98–107)
CO2: 24 MMOL/L (ref 20–31)
CREAT SERPL-MCNC: 0.62 MG/DL (ref 0.5–0.9)
GFR AFRICAN AMERICAN: >60 ML/MIN
GFR NON-AFRICAN AMERICAN: >60 ML/MIN
GFR SERPL CREATININE-BSD FRML MDRD: ABNORMAL ML/MIN/{1.73_M2}
GFR SERPL CREATININE-BSD FRML MDRD: ABNORMAL ML/MIN/{1.73_M2}
GLUCOSE FASTING: 88 MG/DL (ref 70–99)
LIPASE: 21 U/L (ref 13–60)
POTASSIUM SERPL-SCNC: 4.7 MMOL/L (ref 3.7–5.3)
SODIUM BLD-SCNC: 146 MMOL/L (ref 135–144)
TOTAL PROTEIN: 7 G/DL (ref 6.4–8.3)

## 2022-03-04 PROCEDURE — 99213 OFFICE O/P EST LOW 20 MIN: CPT | Performed by: PHYSICIAN ASSISTANT

## 2022-03-04 ASSESSMENT — ENCOUNTER SYMPTOMS
RESPIRATORY NEGATIVE: 1
NAUSEA: 1
COLOR CHANGE: 0
EYES NEGATIVE: 1
ABDOMINAL PAIN: 0

## 2022-03-04 NOTE — PROGRESS NOTES
[address] [facility] PRIMARY CARE  [address]  [facility] [address]  Dept: Melyssa Dupont is a 57 y.o. female who presents today for her medical conditions/complaints as noted below. Chief Complaint   Patient presents with    Follow-up     patient is here today for 1 week f/u on left breast- patient states getting better- still slight rash        HPI:     HPI  Finished ABX. Rash has improved but not gone. No new symptoms with it. Mom had inflammatory breast cancer hx. She is still having abdominal bloat and nausea and reflux is worse. Had this a few years ago and work up was ok. Keeps gaining weight and always bloated.      Lyrica is helping a lot     LDL Cholesterol (mg/dL)   Date Value   10/03/2020 104     LDL Calculated (mg/dL)   Date Value   12/07/2017 138       (goal LDL is <100)   AST (U/L)   Date Value   06/22/2021 26     ALT (U/L)   Date Value   06/22/2021 35 (H)     BUN (mg/dL)   Date Value   06/08/2021 9     BP Readings from Last 3 Encounters:   03/04/22 122/82   02/25/22 138/84   10/04/21 130/70          (goal 120/80)    Past Medical History:   Diagnosis Date    Arthritis     Decreased vision of right eye     Dental bridge present     permanent left upper    Deviated septum     Dry skin     GERD (gastroesophageal reflux disease)     History of chest pain     past,stress test negative,poss acid reflux    Sinus infection 01/04/2018    on antibiotics x 10 days,better no cough or fever    Sleep apnea     mild no machine    Snores     Tingling     Urgency of urination     occas      Past Surgical History:   Procedure Laterality Date    CATARACT REMOVAL Bilateral     COLONOSCOPY      EYE SURGERY      EYE SURGERY Bilateral     YAG- right x3, left x2    KNEE SURGERY Right 2013    orif patella    CO RELEAS VITREOUS,SUBRET/CHOROID FLUID Right 1/17/2018    VITRECTOMY 23 GAUGE, membrane peel performed by Isaias Rowell MD at [place] [address]  PRE-MALIGNANT / BENIGN SKIN LESION EXCISION      x2    VITRECTOMY Right 01/17/2018    WISDOM TOOTH EXTRACTION         Family History   Problem Relation Age of Onset    Cancer Mother         breast    Breast Cancer Mother 76    Heart Disease Father     Diabetes Father        Social History     Tobacco Use    Smoking status: Never Smoker    Smokeless tobacco: Never Used   Substance Use Topics    Alcohol use: Yes     Alcohol/week: 0.0 standard drinks      Current Outpatient Medications   Medication Sig Dispense Refill    pregabalin (LYRICA) 50 MG capsule Take 1 capsule by mouth 2 times daily for 60 days. 60 capsule 0    cephALEXin (KEFLEX) 500 MG capsule Take 1 capsule by mouth 3 times daily for 7 days 21 capsule 0    etodolac (LODINE) 400 MG tablet Take 400 mg by mouth 2 times daily      Cholecalciferol (VITAMIN D3) 1.25 MG (65838 UT) CAPS Take by mouth      magnesium 200 MG TABS tablet Take 200 mg by mouth daily      famotidine (PEPCID) 20 MG tablet Take 1 tablet by mouth 2 times daily 180 tablet 3    montelukast (SINGULAIR) 10 MG tablet TAKE 1 TABLET NIGHTLY 90 tablet 3    cetirizine (ZYRTEC) 10 MG tablet Take 10 mg by mouth daily      Ascorbic Acid (VITAMIN C) 1000 MG tablet Take 1,000 mg by mouth 2 times daily      aspirin 325 MG tablet Take 325 mg by mouth daily       No current facility-administered medications for this visit.      Allergies   Allergen Reactions    Sulfa Antibiotics Itching       Health Maintenance   Topic Date Due    HIV screen  Never done    Shingles Vaccine (2 of 3) 02/03/2016    Cervical cancer screen  02/22/2021    COVID-19 Vaccine (3 - Booster for Pfizer series) 09/07/2021    Breast cancer screen  09/02/2022    Depression Screen  10/04/2022    Diabetes screen  10/03/2023    DTaP/Tdap/Td vaccine (4 - Td or Tdap) 01/13/2024    Lipid screen  10/03/2025    Colorectal Cancer Screen  02/13/2028    Flu vaccine  Completed    Hepatitis C screen  Completed    Hepatitis A vaccine  Aged Out    Hepatitis B vaccine  Aged Out    Hib vaccine  Aged Out    Meningococcal (ACWY) vaccine  Aged Out    Pneumococcal 0-64 years Vaccine  Aged Out       Subjective:      Review of Systems   Constitutional: Negative for chills, diaphoresis, fever and unexpected weight change. HENT: Negative. Negative for mouth sores. Eyes: Negative. Respiratory: Negative. Cardiovascular: Negative. Gastrointestinal: Positive for nausea. Negative for abdominal pain. Reflux   Musculoskeletal: Negative for arthralgias and myalgias. Skin: Negative for color change, pallor, rash and wound. Allergic/Immunologic: Negative for environmental allergies, food allergies and immunocompromised state. Hematological: Negative for adenopathy. Objective:     /82   Pulse 84   Ht 5' 3.96\" (1.625 m)   Wt 194 lb 6.4 oz (88.2 kg)   SpO2 97%   BMI 33.41 kg/m²   Physical Exam  Vitals and nursing note reviewed. Constitutional:       Appearance: Normal appearance. She is not ill-appearing. Cardiovascular:      Rate and Rhythm: Normal rate and regular rhythm. Heart sounds: Normal heart sounds. Pulmonary:      Effort: Pulmonary effort is normal.      Breath sounds: Normal breath sounds. Chest:   Breasts:      Left: Skin change present. No swelling, bleeding, inverted nipple, mass, nipple discharge, tenderness, axillary adenopathy or supraclavicular adenopathy. Comments: Rash is smaller but deep red and peeled looking--she has not picked it or been scratching it. Abdominal:      General: Bowel sounds are normal. There is no distension. Palpations: There is no mass. Tenderness: There is abdominal tenderness (periumbilical). There is no guarding or rebound. Lymphadenopathy:      Cervical: No cervical adenopathy. Upper Body:      Left upper body: No supraclavicular or axillary adenopathy. Neurological:      Mental Status: She is alert. Assessment:       Diagnosis Orders   1. Periumbilical pain  Lipase    Comprehensive Metabolic Panel, Fasting    US ABDOMEN LIMITED   2. Bloating  Lipase    Comprehensive Metabolic Panel, Fasting    US ABDOMEN LIMITED   3. Gastroesophageal reflux disease, unspecified whether esophagitis present  Lipase    Comprehensive Metabolic Panel, Fasting    US ABDOMEN LIMITED   4. Breast lesion  [address] [facility]        Plan:    See breast surgeon  US of GB and pancreas  BW ordered    No follow-ups on file. Orders Placed This Encounter   Procedures    US ABDOMEN LIMITED     This procedure can be scheduled via [employer]. Access your [employer] account by visiting [email or web address]. Standing Status:   Future     Standing Expiration Date:   3/4/2023     Order Specific Question:   Specify organ? Answer:   GALLBLADDER     Order Specific Question:   Specify organ? Answer:   PANCREAS    Lipase     Standing Status:   Future     Number of Occurrences:   1     Standing Expiration Date:   3/4/2023    Comprehensive Metabolic Panel, Fasting     Standing Status:   Future     Number of Occurrences:   1     Standing Expiration Date:   3/4/2023   [address] [facility]     Referral Priority:   Routine     Referral Type:   Eval and Treat     Referral Reason:   Specialty Services Required     Referred to Provider:   Moe Kothari DO     Requested Specialty:   General Surgery     Number of Visits Requested:   1     No orders of the defined types were placed in this encounter. Patient given educational materials - see patient instructions. Discussed use, benefit, and side effects of prescribed medications. All patient questions answered. Pt voiced understanding. Reviewed health maintenance. Instructed to continue current medications, diet and exercise. Patient agreed with treatment plan. Follow up as directed.      Electronically signed by Bee Bryant PA-C on 3/4/2022 at 10:26 AM

## 2022-03-08 RX ORDER — MONTELUKAST SODIUM 10 MG/1
TABLET ORAL
Qty: 90 TABLET | Refills: 3 | Status: SHIPPED | OUTPATIENT
Start: 2022-03-08 | End: 2022-10-11 | Stop reason: SDUPTHER

## 2022-03-14 ENCOUNTER — HOSPITAL ENCOUNTER (OUTPATIENT)
Dept: ULTRASOUND IMAGING | Age: 62
Discharge: HOME | End: 2022-03-16
Payer: COMMERCIAL

## 2022-03-14 DIAGNOSIS — R10.33 PERIUMBILICAL PAIN: ICD-10-CM

## 2022-03-14 DIAGNOSIS — R14.0 BLOATING: ICD-10-CM

## 2022-03-14 DIAGNOSIS — K21.9 GASTROESOPHAGEAL REFLUX DISEASE, UNSPECIFIED WHETHER ESOPHAGITIS PRESENT: ICD-10-CM

## 2022-03-14 PROCEDURE — 76705 ECHO EXAM OF ABDOMEN: CPT

## 2022-03-15 DIAGNOSIS — R10.33 PERIUMBILICAL ABDOMINAL PAIN: Primary | ICD-10-CM

## 2022-04-07 ENCOUNTER — HOSPITAL ENCOUNTER (OUTPATIENT)
Dept: CT IMAGING | Age: 62
Discharge: HOME | End: 2022-04-09
Payer: COMMERCIAL

## 2022-04-07 DIAGNOSIS — R10.33 PERIUMBILICAL ABDOMINAL PAIN: ICD-10-CM

## 2022-04-07 PROCEDURE — 74177 CT ABD & PELVIS W/CONTRAST: CPT

## 2022-04-07 PROCEDURE — 6360000004 HC RX CONTRAST MEDICATION: Performed by: PHYSICIAN ASSISTANT

## 2022-04-07 PROCEDURE — 2580000003 HC RX 258: Performed by: PHYSICIAN ASSISTANT

## 2022-04-07 RX ORDER — 0.9 % SODIUM CHLORIDE 0.9 %
80 INTRAVENOUS SOLUTION INTRAVENOUS ONCE
Status: COMPLETED | OUTPATIENT
Start: 2022-04-07 | End: 2022-04-07

## 2022-04-07 RX ORDER — SODIUM CHLORIDE 0.9 % (FLUSH) 0.9 %
10 SYRINGE (ML) INJECTION PRN
Status: DISCONTINUED | OUTPATIENT
Start: 2022-04-07 | End: 2022-04-10 | Stop reason: HOSPADM

## 2022-04-07 RX ADMIN — SODIUM CHLORIDE, PRESERVATIVE FREE 10 ML: 5 INJECTION INTRAVENOUS at 14:15

## 2022-04-07 RX ADMIN — SODIUM CHLORIDE 80 ML: 9 INJECTION, SOLUTION INTRAVENOUS at 14:15

## 2022-04-07 RX ADMIN — IOPAMIDOL 75 ML: 755 INJECTION, SOLUTION INTRAVENOUS at 14:15

## 2022-04-07 RX ADMIN — IOHEXOL 50 ML: 240 INJECTION, SOLUTION INTRATHECAL; INTRAVASCULAR; INTRAVENOUS; ORAL at 14:15

## 2022-04-13 ENCOUNTER — OFFICE VISIT (OUTPATIENT)
Dept: SURGERY | Age: 62
End: 2022-04-13
Payer: COMMERCIAL

## 2022-04-13 VITALS
TEMPERATURE: 97.7 F | DIASTOLIC BLOOD PRESSURE: 76 MMHG | HEIGHT: 64 IN | HEART RATE: 82 BPM | SYSTOLIC BLOOD PRESSURE: 116 MMHG | WEIGHT: 192.2 LBS | BODY MASS INDEX: 32.81 KG/M2

## 2022-04-13 DIAGNOSIS — R23.4 BREAST SKIN CHANGES: ICD-10-CM

## 2022-04-13 DIAGNOSIS — Z80.3 FAMILY HISTORY OF BREAST CANCER: Primary | ICD-10-CM

## 2022-04-13 PROCEDURE — 99203 OFFICE O/P NEW LOW 30 MIN: CPT | Performed by: SURGERY

## 2022-04-13 NOTE — PROGRESS NOTES
History and Physical  [facility]    Patient's Name/Date of Birth: Stefania Bustillos / 1960 (12 y.o.)    Date: April 13, 2022     HPI: Pt is a 60 y.o. female who presents to [facility] for evaluation of a skin change to her left lateral breast at the 3 o'clock position. The patient states that in February she noticed a boil on this area associated with some burning around it. This resolved after a couple days and the boil popped on its own. She does not recall any significant amount of drainage from the area. No prior history of skin issues with her breast. She notes there was previous erythema of this area that has since resolved. She saw her PCP when this occurred and was prescribed a course of oral Keflex. No fevers or chills. The patient does have a significant family history of intraductal breast cancer and then inflammatory breast cancer in her mother. She believes her mother was diagnosed a few years younger than her current age-likely early 63's. Breast cancer also present in her great great grandmother on her fathers side and a cousin on her fathers side. She has had several prior mammograms without issues. Most recently September of 2021 revealed a normal mammogram, BIRADS-1. She has not had any prior genetic testing.      Past Medical History:   Diagnosis Date    Arthritis     Decreased vision of right eye     Dental bridge present     permanent left upper    Deviated septum     Dry skin     GERD (gastroesophageal reflux disease)     History of chest pain     past,stress test negative,poss acid reflux    Sinus infection 01/04/2018    on antibiotics x 10 days,better no cough or fever    Sleep apnea     mild no machine    Snores     Tingling     Urgency of urination     occas       Past Surgical History:   Procedure Laterality Date    CATARACT REMOVAL Bilateral     COLONOSCOPY      EYE SURGERY      EYE SURGERY Bilateral     YAG- right x3, left x2    KNEE SURGERY Right 2013    orif patella    SD RELEAS VITREOUS,SUBRET/CHOROID FLUID Right 1/17/2018    VITRECTOMY 23 GAUGE, membrane peel performed by Marva Butler MD at [facility] [address] PRE-MALIGNANT / [address]      x2    VITRECTOMY Right 01/17/2018    WISDOM TOOTH EXTRACTION         Current Outpatient Medications   Medication Sig Dispense Refill    pregabalin (LYRICA) 50 MG capsule Take 1 capsule by mouth 2 times daily for 90 days. 180 capsule 1    montelukast (SINGULAIR) 10 MG tablet TAKE 1 TABLET NIGHTLY 90 tablet 3    etodolac (LODINE) 400 MG tablet Take 400 mg by mouth 2 times daily      Cholecalciferol (VITAMIN D3) 1.25 MG (29676 UT) CAPS Take by mouth      magnesium 200 MG TABS tablet Take 200 mg by mouth daily      famotidine (PEPCID) 20 MG tablet Take 1 tablet by mouth 2 times daily 180 tablet 3    cetirizine (ZYRTEC) 10 MG tablet Take 10 mg by mouth daily      Ascorbic Acid (VITAMIN C) 1000 MG tablet Take 1,000 mg by mouth 2 times daily (Patient not taking: Reported on 4/13/2022)      aspirin 325 MG tablet Take 325 mg by mouth daily (Patient not taking: Reported on 4/13/2022)       No current facility-administered medications for this visit. Allergies   Allergen Reactions    Sulfa Antibiotics Itching       Family History   Problem Relation Age of Onset    Cancer Mother         breast    Breast Cancer Mother 76    Heart Disease Father     Diabetes Father        Social History     Socioeconomic History    Marital status:      Spouse name: Not on file    Number of children: Not on file    Years of education: Not on file    Highest education level: Not on file   Occupational History    Not on file   Tobacco Use    Smoking status: Never Smoker    Smokeless tobacco: Never Used   Substance and Sexual Activity    Alcohol use:  Yes     Alcohol/week: 0.0 standard drinks    Drug use: No    Sexual activity: Not on file   Other Topics Concern    Not on file   Social History Narrative    Not on file     Social Determinants of Health     Financial Resource Strain: Low Risk     Difficulty of Paying Living Expenses: Not hard at all   Food Insecurity: No Food Insecurity    Worried About Running Out of Food in the Last Year: Never true    [address] in the Last Year: Never true   Transportation Needs:     Lack of Transportation (Medical): Not on file    Lack of Transportation (Non-Medical): Not on file   Physical Activity:     Days of Exercise per Week: Not on file    Minutes of Exercise per Session: Not on file   Stress:     Feeling of Stress : Not on file   Social Connections:     Frequency of Communication with Friends and Family: Not on file    Frequency of Social Gatherings with Friends and Family: Not on file    Attends Sabianist Services: Not on file    Active Member of [address] [place] [address] [employer] or Organizations: Not on file    Attends Club or Organization Meetings: Not on file    Marital Status: Not on file   Intimate Partner Violence:     Fear of Current or Ex-Partner: Not on file    Emotionally Abused: Not on file    Physically Abused: Not on file    Sexually Abused: Not on file   Housing Stability:     Unable to Pay for Housing in the Last Year: Not on file    Number of [place] in the Last Year: Not on file    Unstable Housing in the Last Year: Not on file       ROS:   GEN: Denies recent weight loss, fatigue, fevers, chills. Endorses weight gain. HEENT: No vision or hearing changes   CV: No chest pain, palpitations  RESP: Denies shortness of breath, wheezing   GI: Denies abdominal pain, nausea, emesis   : Denies increased or decreased frequency or dysuria.   HEM[de-identified] Denies easy bruising or bleeding   ENDO: Denies polydipsia, no heat or cold intolerance  NEURO: Denies weakness, dizziness, HA    Physical Exam:  Vitals:    04/13/22 0913   BP: 116/76   Pulse: 82   Temp: 97.7 °F (36.5 °C)     General: A & O x3, not acutely distressed  HEENT:  NCAT, EOMI, oral mucus membrane pink and moist  Breast:   Heart: Regular rate   Lungs: Normal effort, no respiratory distress, no accessory muscle use   Abdomen: Soft, nondistended  Extremity: Normal, without deformities, edema, or skin discoloration  SKIN: Skin color, texture, turgor normal. No rashes or lesions. Neuro: CN II-XII grossly intact. No motor or sensory deficits appreciated. [address]: normal throughout upper and lower extremities    Assessment      Diagnosis Orders   1. Family history of breast cancer  [facility]   2. Breast skin changes       Plan   1. Bilateral breast without palpable masses, no palpable lymphadenopathy in axillas. Mammogram reviewed 9/2021 and without abnormalities. Small area of scarring on the lateral breast without associated erythema, induration, or fluctuance. The patient likely had a folliculitis or small abscess that has since resolved. 2. Discussed the option of her seeing a genetic counselor to further discuss her family history and if genetic testing is indicated in her scenario. Referral sent. 3. Discussed returning the the clinic with any breast skin changes or concerns for developing abscesses  4. Continue with annual mammograms for breast cancer screening kathy. considering patient's family history. Neda Fuentes DO  General Surgery PGY-3  Attending Physician Statement  I have discussed the case with Dr Marcio Weinstein, including pertinent history and exam findings with the resident. I have seen and examined the patient and the key elements of the encounter have been performed by me. I agree with the assessment, plan and orders as documented by the resident.       Electronically signed by Lucian Saunders DO  on 4/25/2022 at 1:14 PM

## 2022-04-13 NOTE — PATIENT INSTRUCTIONS
Thank you letting us take care of you today. We hope that all your questions were addressed. If a question was overlooked or something else comes to mind after you return home, please call our office at [phone]. If you need to cancel or change an appointment, surgery or procedure, please contact the office at [phone].

## 2022-04-13 NOTE — PROGRESS NOTES
Visit Information    Have you changed or started any medications since your last visit including any over-the-counter medicines, vitamins, or herbal medicines? no   Have you stopped taking any of your medications? Is so, why? -  no  Are you having any side effects from any of your medications? - no    Have you seen any other physician or provider since your last visit?  no   Have you had any other diagnostic tests since your last visit?  no   Have you been seen in the emergency room and/or had an admission in a hospital since we last saw you?  no   Have you had your routine dental cleaning in the past 6 months?  no     Do you have an active MyChart account? If no, what is the barrier?   Yes    Patient Care Team:  Sherlyn Ramires MD as PCP - General (Family Medicine)  Sherlyn Ramires MD as PCP - [facility]    Medical History Review  Past Medical, Family, and Social History reviewed and does not contribute to the patient presenting condition    Health Maintenance   Topic Date Due    HIV screen  Never done    Shingles Vaccine (2 of 3) 02/03/2016    Cervical cancer screen  02/22/2021    COVID-19 Vaccine (3 - Booster for Pfizer series) 09/07/2021    Breast cancer screen  09/02/2022    Depression Screen  10/04/2022    DTaP/Tdap/Td vaccine (4 - Td or Tdap) 01/13/2024    Diabetes screen  03/04/2025    Lipid screen  10/03/2025    Colorectal Cancer Screen  02/13/2028    Flu vaccine  Completed    Hepatitis C screen  Completed    Hepatitis A vaccine  Aged Out    Hepatitis B vaccine  Aged Out    Hib vaccine  Aged Out    Meningococcal (ACWY) vaccine  Aged Out    Pneumococcal 0-64 years Vaccine  Aged Out

## 2022-04-22 ENCOUNTER — OFFICE VISIT (OUTPATIENT)
Dept: PRIMARY CARE CLINIC | Age: 62
End: 2022-04-22
Payer: COMMERCIAL

## 2022-04-22 VITALS
HEART RATE: 72 BPM | OXYGEN SATURATION: 98 % | HEIGHT: 60 IN | DIASTOLIC BLOOD PRESSURE: 80 MMHG | WEIGHT: 192.8 LBS | SYSTOLIC BLOOD PRESSURE: 130 MMHG | BODY MASS INDEX: 37.85 KG/M2

## 2022-04-22 DIAGNOSIS — R68.2 DRY MOUTH: Primary | ICD-10-CM

## 2022-04-22 DIAGNOSIS — R53.82 CHRONIC FATIGUE: ICD-10-CM

## 2022-04-22 DIAGNOSIS — M25.50 ARTHRALGIA, UNSPECIFIED JOINT: ICD-10-CM

## 2022-04-22 DIAGNOSIS — R06.89 GASPING FOR BREATH: ICD-10-CM

## 2022-04-22 DIAGNOSIS — H04.123 DRY EYES: ICD-10-CM

## 2022-04-22 DIAGNOSIS — R68.2 DRY MOUTH: ICD-10-CM

## 2022-04-22 LAB
C-REACTIVE PROTEIN: 9.7 MG/L (ref 0–5)
RHEUMATOID FACTOR: <10 IU/ML

## 2022-04-22 PROCEDURE — 99214 OFFICE O/P EST MOD 30 MIN: CPT | Performed by: PHYSICIAN ASSISTANT

## 2022-04-22 ASSESSMENT — ENCOUNTER SYMPTOMS: ABDOMINAL PAIN: 1

## 2022-04-22 NOTE — PROGRESS NOTES
[address] [facility] PRIMARY CARE  [address] [facility] [address]  Dept: Melyssa Dupont is a 60 y.o. female who presents today for her medical conditions/complaints as noted below. Chief Complaint   Patient presents with    Results     Pt is here to discuss Ct results    Other     fullness and tightness of throat back from dec. 2020 surgery       HPI:     HPI  CT shows a very small umbilical hernia containing fat only which could be cause of pain. 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 Does get some recurrent UTIs    Gasps at night she thinks from dryness in her mouth  She did have a CPAP machine but could not tolerate. Uses all the biotene products   Recently had oral surgery for thinning of a sublingual tonsil and leukoplakia removal    Saw a Rheum about possible Sjogren's---did not have in past  Also saw Dr. Jaylin Asencio in the past and he said she had a dysautonomia causing her dry mouth, sweats, fatigue and dizziness. Used Wellbutrin which at the time was way too expensive to continue.   LDL Cholesterol (mg/dL)   Date Value   10/03/2020 104     LDL Calculated (mg/dL)   Date Value   12/07/2017 138       (goal LDL is <100)   AST (U/L)   Date Value   03/04/2022 30     ALT (U/L)   Date Value   03/04/2022 39 (H)     BUN (mg/dL)   Date Value   03/04/2022 17     BP Readings from Last 3 Encounters:   04/22/22 130/80   04/13/22 116/76   03/04/22 122/82          (goal 120/80)    Past Medical History:   Diagnosis Date    Arthritis     Decreased vision of right eye     Dental bridge present     permanent left upper    Deviated septum     Dry skin     GERD (gastroesophageal reflux disease)     History of chest pain     past,stress test negative,poss acid reflux    Sinus infection 01/04/2018    on antibiotics x 10 days,better no cough or fever    Sleep apnea     mild no machine    Snores     Tingling     Urgency of urination occas      Past Surgical History:   Procedure Laterality Date    CATARACT REMOVAL Bilateral     COLONOSCOPY      EYE SURGERY      EYE SURGERY Bilateral     YAG- right x3, left x2    KNEE SURGERY Right 2013    orif patella    MOUTH SURGERY      sublingual tonsil partial removal    [place] RELEAS VITREOUS,SUBRET/CHOROID FLUID Right 01/17/2018    VITRECTOMY 23 GAUGE, membrane peel performed by Genevieve Ricci MD at [facility] / [address]      x2    VITRECTOMY Right 01/17/2018    WISDOM TOOTH EXTRACTION         Family History   Problem Relation Age of Onset    Cancer Mother         breast    Breast Cancer Mother 76    Heart Disease Father     Diabetes Father        Social History     Tobacco Use    Smoking status: Never Smoker    Smokeless tobacco: Never Used   Substance Use Topics    Alcohol use: Yes     Alcohol/week: 0.0 standard drinks      Current Outpatient Medications   Medication Sig Dispense Refill    pregabalin (LYRICA) 50 MG capsule Take 1 capsule by mouth 2 times daily for 90 days. 180 capsule 1    montelukast (SINGULAIR) 10 MG tablet TAKE 1 TABLET NIGHTLY 90 tablet 3    etodolac (LODINE) 400 MG tablet Take 400 mg by mouth 2 times daily      Cholecalciferol (VITAMIN D3) 1.25 MG (53710 UT) CAPS Take by mouth      magnesium 200 MG TABS tablet Take 200 mg by mouth daily      famotidine (PEPCID) 20 MG tablet Take 1 tablet by mouth 2 times daily 180 tablet 3    Ascorbic Acid (VITAMIN C) 1000 MG tablet Take 1,000 mg by mouth 2 times daily       aspirin 325 MG tablet Take 325 mg by mouth daily        No current facility-administered medications for this visit.      Allergies   Allergen Reactions    Sulfa Antibiotics Itching       Health Maintenance   Topic Date Due    HIV screen  Never done    Shingles Vaccine (2 of 3) 02/03/2016    Cervical cancer screen  02/22/2021    COVID-19 Vaccine (3 - Booster for Pfizer series) 09/07/2021    Breast cancer screen 09/02/2022    Depression Screen  10/04/2022    DTaP/Tdap/Td vaccine (4 - Td or Tdap) 01/13/2024    Diabetes screen  03/04/2025    Lipids  10/03/2025    Colorectal Cancer Screen  02/13/2028    Flu vaccine  Completed    Hepatitis C screen  Completed    Hepatitis A vaccine  Aged Out    Hepatitis B vaccine  Aged Out    Hib vaccine  Aged Out    Meningococcal (ACWY) vaccine  Aged Out    Pneumococcal 0-64 years Vaccine  Aged Out       Subjective:      Review of Systems   Constitutional: Positive for fatigue (improved ). HENT:        Dry mouth   Eyes:        Dry eyes   Gastrointestinal: Positive for abdominal pain (off and on). Endocrine: Positive for heat intolerance. Musculoskeletal: Positive for arthralgias. Neurological: Positive for light-headedness. Objective:     /80   Pulse 72   Ht 5' (1.524 m)   Wt 192 lb 12.8 oz (87.5 kg)   SpO2 98%   BMI 37.65 kg/m²   Physical Exam  Vitals and nursing note reviewed. Constitutional:       Appearance: Normal appearance. HENT:      Mouth/Throat:      Mouth: Mucous membranes are dry. Cardiovascular:      Rate and Rhythm: Normal rate and regular rhythm. Heart sounds: Normal heart sounds. Pulmonary:      Effort: Pulmonary effort is normal.      Breath sounds: Normal breath sounds. No wheezing, rhonchi or rales. Neurological:      Mental Status: She is alert and oriented to person, place, and time. Assessment:       Diagnosis Orders   1. Dry mouth  PILAR Screen with Reflex    C-Reactive Protein    Sedimentation rate, automated    Rheumatoid Factor   2. Dry eyes  PILAR Screen with Reflex    C-Reactive Protein    Sedimentation rate, automated    Rheumatoid Factor   3. Arthralgia, unspecified joint  PILAR Screen with Reflex    C-Reactive Protein    Sedimentation rate, automated    Rheumatoid Factor   4. Gasping for breath  Baseline Diagnostic Sleep Study   5.  Chronic fatigue  Baseline Diagnostic Sleep Study        Plan:    BW ordered for autoimmne  Sleep study now since she gained about 30 pounds since last one   See oral surgery again with this same feeling from the leukoplakia/sublingual tonsil problem--get oral surgeon note   Reviewed CT scan with patient and provided reassurance. Return in about 2 months (around 6/22/2022) for Imaging results, Lab Results. Orders Placed This Encounter   Procedures    PILAR Screen with Reflex     Standing Status:   Future     Number of Occurrences:   1     Standing Expiration Date:   4/22/2023    C-Reactive Protein     Standing Status:   Future     Number of Occurrences:   1     Standing Expiration Date:   4/23/2023    Sedimentation rate, automated     Standing Status:   Future     Number of Occurrences:   1     Standing Expiration Date:   4/22/2023    Rheumatoid Factor     Standing Status:   Future     Number of Occurrences:   1     Standing Expiration Date:   4/22/2023    Baseline Diagnostic Sleep Study     Standing Status:   Future     Standing Expiration Date:   4/22/2023     Order Specific Question:   Adult or Pediatric     Answer:   Adult Study (>7 Years)     Order Specific Question:   Location For Sleep Study     Answer: [facility] Specific Question:   Select Sleep Lab Location     Answer:   [address]     Order Specific Question:   Pre-Study Patient Questions: Answer:   Reports choking or gasping for breath during sleep     Order Specific Question:   Pre-Study Patient Questions: Answer:   Reports multiple awakenings throughout the night     No orders of the defined types were placed in this encounter. Patient given educational materials - see patient instructions. Discussed use, benefit, and side effects of prescribed medications. All patient questions answered. Pt voiced understanding. Reviewed health maintenance. Instructed to continue current medications, diet and exercise. Patient agreed with treatment plan. Follow up as directed.      Electronically signed by Candy Soto PA-C on 4/22/2022 at 8:29 PM

## 2022-04-23 LAB — SEDIMENTATION RATE, ERYTHROCYTE: 10 MM/HR (ref 0–30)

## 2022-04-25 ENCOUNTER — TELEPHONE (OUTPATIENT)
Dept: PRIMARY CARE CLINIC | Age: 62
End: 2022-04-25

## 2022-04-25 LAB
ANTI DNA DOUBLE STRANDED: 0.8 IU/ML
ANTI-NUCLEAR ANTIBODY (ANA): NEGATIVE
ENA ANTIBODIES SCREEN: 0.3 U/ML

## 2022-04-25 RX ORDER — AMOXICILLIN AND CLAVULANATE POTASSIUM 875; 125 MG/1; MG/1
1 TABLET, FILM COATED ORAL 2 TIMES DAILY
Qty: 20 TABLET | Refills: 0 | Status: SHIPPED | OUTPATIENT
Start: 2022-04-25 | End: 2022-05-05

## 2022-04-25 NOTE — TELEPHONE ENCOUNTER
Was bitten by her mothers dog. The dog is very food aggressive and she got between a bone and the dog without realizing it and the dog grabbed her forearm. She is leaving for vacation tomorrow and wondering if you would call in AB for her. She has cleaned it and has put AB ointment on it and wrapped it.      Pharmacy [facility]

## 2022-04-25 NOTE — TELEPHONE ENCOUNTER
Patient said she had asked at last visit about switching her care over to [facility].  She said Dr. Carroll Ward has been great just feels she clicks better with Lisbeth.

## 2022-06-21 ENCOUNTER — INITIAL CONSULT (OUTPATIENT)
Dept: ONCOLOGY | Age: 62
End: 2022-06-21
Payer: COMMERCIAL

## 2022-06-21 DIAGNOSIS — Z80.42 FAMILY HISTORY OF PROSTATE CANCER: ICD-10-CM

## 2022-06-21 DIAGNOSIS — Z80.3 FAMILY HISTORY OF BREAST CANCER: Primary | ICD-10-CM

## 2022-06-21 PROCEDURE — 96040 PR GENETIC COUNSELING, EACH 30 MIN: CPT | Performed by: GENETIC COUNSELOR, MS

## 2022-06-23 ENCOUNTER — OFFICE VISIT (OUTPATIENT)
Dept: PRIMARY CARE CLINIC | Age: 62
End: 2022-06-23
Payer: COMMERCIAL

## 2022-06-23 VITALS
BODY MASS INDEX: 37.73 KG/M2 | DIASTOLIC BLOOD PRESSURE: 88 MMHG | OXYGEN SATURATION: 99 % | WEIGHT: 192.2 LBS | SYSTOLIC BLOOD PRESSURE: 132 MMHG | HEART RATE: 86 BPM | HEIGHT: 60 IN

## 2022-06-23 DIAGNOSIS — H20.9 IRITIS OF BOTH EYES: ICD-10-CM

## 2022-06-23 DIAGNOSIS — G44.52 NEW DAILY PERSISTENT HEADACHE: Primary | ICD-10-CM

## 2022-06-23 DIAGNOSIS — M25.50 ARTHRALGIA, UNSPECIFIED JOINT: ICD-10-CM

## 2022-06-23 DIAGNOSIS — M13.0 POLYARTHRITIS: ICD-10-CM

## 2022-06-23 DIAGNOSIS — G90.9 AUTONOMIC NERVOUS SYSTEM DISORDER: ICD-10-CM

## 2022-06-23 DIAGNOSIS — K21.9 GASTROESOPHAGEAL REFLUX DISEASE WITHOUT ESOPHAGITIS: ICD-10-CM

## 2022-06-23 PROCEDURE — 99213 OFFICE O/P EST LOW 20 MIN: CPT | Performed by: PHYSICIAN ASSISTANT

## 2022-06-23 RX ORDER — ETODOLAC 400 MG/1
400 TABLET, FILM COATED ORAL 2 TIMES DAILY
Qty: 180 TABLET | Refills: 1 | Status: SHIPPED | OUTPATIENT
Start: 2022-06-23 | End: 2022-10-11 | Stop reason: SDUPTHER

## 2022-06-23 RX ORDER — FAMOTIDINE 20 MG/1
20 TABLET, FILM COATED ORAL 2 TIMES DAILY
Qty: 180 TABLET | Refills: 1 | Status: SHIPPED | OUTPATIENT
Start: 2022-06-23 | End: 2022-06-28 | Stop reason: SDUPTHER

## 2022-06-23 ASSESSMENT — PATIENT HEALTH QUESTIONNAIRE - PHQ9
SUM OF ALL RESPONSES TO PHQ QUESTIONS 1-9: 0
2. FEELING DOWN, DEPRESSED OR HOPELESS: 0
SUM OF ALL RESPONSES TO PHQ9 QUESTIONS 1 & 2: 0
SUM OF ALL RESPONSES TO PHQ QUESTIONS 1-9: 0
1. LITTLE INTEREST OR PLEASURE IN DOING THINGS: 0

## 2022-06-23 NOTE — PROGRESS NOTES
[address] [facility] PRIMARY CARE  [address] [facility] [address]  Dept: Melyssa Dupont is a 60 y.o. female who presents today for her medical conditions/complaints as noted below. Chief Complaint   Patient presents with    Labs Only     Follow up on labs results       HPI:     HPI  Has not had sleep study yet--schedule July 5     Labs show one CRP slight elevated at 9.7 so not indicative for an autoimmune issue. HA seems to be on left side and gets watery from that eye sometimes  Opthalomologst says the visual disturbance is not eye related and suggested an MRI    Relief Factor helps her right knee pain. She did not have to have her shot due to this. ENT  Doctor thought Reflux was inflamming tonsils and may also add to the dry mouth. Uses Pepcid mostly once daily    Stopping Zyrtec has helped the dry mouth.      LDL Cholesterol (mg/dL)   Date Value   10/03/2020 104     LDL Calculated (mg/dL)   Date Value   12/07/2017 138       (goal LDL is <100)   AST (U/L)   Date Value   03/04/2022 30     ALT (U/L)   Date Value   03/04/2022 39 (H)     BUN (mg/dL)   Date Value   03/04/2022 17     BP Readings from Last 3 Encounters:   06/23/22 132/88   04/22/22 130/80   04/13/22 116/76          (goal 120/80)    Past Medical History:   Diagnosis Date    Arthritis     Decreased vision of right eye     Dental bridge present     permanent left upper    Deviated septum     Dry skin     GERD (gastroesophageal reflux disease)     History of chest pain     past,stress test negative,poss acid reflux    Sinus infection 01/04/2018    on antibiotics x 10 days,better no cough or fever    Sleep apnea     mild no machine    Snores     Tingling     Urgency of urination     occas      Past Surgical History:   Procedure Laterality Date    CATARACT REMOVAL Bilateral     COLONOSCOPY      EYE SURGERY      EYE SURGERY Bilateral     YAG- right x3, left x2    KNEE SURGERY Right 2013    orif patella    MOUTH SURGERY      sublingual tonsil partial removal    [place] RELEAS VITREOUS,SUBRET/CHOROID FLUID Right 2018    VITRECTOMY 23 GAUGE, membrane peel performed by Enma Lopez MD at [facility] PRE-[facility] / [address]      x2    VITRECTOMY Right 2018    WISDOM TOOTH EXTRACTION         Family History   Problem Relation Age of Onset   [place] Cancer Mother         breast IDC, HER2+, herceptin, later developed inflammatory breast cancer, later 3rd type of breast cancer mid 62s    Breast Cancer Mother 76    Bilateral breast cancer Mother     Heart Disease Father     Diabetes Father     Prostate Cancer Father         diagnosed older    Cancer Paternal Grandmother         breast cancer 76s    Cancer Other         daughter of dad's brother,  of breast cancer in her 25s    Cancer Other         maternal grandmother's mother, radical mastectomy treated in [de-identified], lived to 80y       Social History     Tobacco Use    Smoking status: Never Smoker    Smokeless tobacco: Never Used   Substance Use Topics    Alcohol use: Yes     Alcohol/week: 0.0 standard drinks      Current Outpatient Medications   Medication Sig Dispense Refill    famotidine (PEPCID) 20 MG tablet Take 1 tablet by mouth 2 times daily 180 tablet 1    etodolac (LODINE) 400 MG tablet Take 1 tablet by mouth 2 times daily 180 tablet 1    montelukast (SINGULAIR) 10 MG tablet TAKE 1 TABLET NIGHTLY 90 tablet 3    Cholecalciferol (VITAMIN D3) 1.25 MG (86945 UT) CAPS Take by mouth      magnesium 200 MG TABS tablet Take 200 mg by mouth daily      Ascorbic Acid (VITAMIN C) 1000 MG tablet Take 1,000 mg by mouth 2 times daily       aspirin 325 MG tablet Take 325 mg by mouth daily       pregabalin (LYRICA) 50 MG capsule Take 1 capsule by mouth 2 times daily for 90 days. 180 capsule 1     No current facility-administered medications for this visit.      Allergies   Allergen Reactions    Sulfa Antibiotics Itching       Health Maintenance   Topic Date Due    HIV screen  Never done    Shingles vaccine (2 of 3) 02/03/2016    Cervical cancer screen  02/22/2021    COVID-19 Vaccine (3 - Booster for Pfizer series) 09/07/2021    Breast cancer screen  09/02/2022    Depression Screen  10/04/2022    DTaP/Tdap/Td vaccine (4 - Td or Tdap) 01/13/2024    Diabetes screen  03/04/2025    Lipids  10/03/2025    Colorectal Cancer Screen  02/13/2028    Flu vaccine  Completed    Hepatitis C screen  Completed    Hepatitis A vaccine  Aged Out    Hepatitis B vaccine  Aged Out    Hib vaccine  Aged Out    Meningococcal (ACWY) vaccine  Aged Out    Pneumococcal 0-64 years Vaccine  Aged Out       Subjective:      Review of Systems   Eyes: Positive for visual disturbance (looked like a \"auror borelos\" in vision). Musculoskeletal: Positive for arthralgias. Back pain: right knee pain. Neurological: Positive for headaches. Objective:     /88   Pulse 86   Ht 5' (1.524 m)   Wt 192 lb 3.2 oz (87.2 kg)   SpO2 99%   BMI 37.54 kg/m²   Physical Exam  Vitals and nursing note reviewed. Constitutional:       Appearance: Normal appearance. Cardiovascular:      Rate and Rhythm: Normal rate. Pulmonary:      Effort: Pulmonary effort is normal.   Neurological:      Mental Status: She is alert and oriented to person, place, and time. Psychiatric:         Mood and Affect: Mood normal.         Assessment:       Diagnosis Orders   1. New daily persistent headache  MRI BRAIN W WO CONTRAST   2. Gastroesophageal reflux disease without esophagitis  famotidine (PEPCID) 20 MG tablet   3. Arthralgia, unspecified joint  etodolac (LODINE) 400 MG tablet   4. Polyarthritis     5. Autonomic nervous system disorder     6.  Iritis of both eyes          Plan:    MRI of brain w/w/o contrast  Get Eye doctor note for chart  Exedrine migraine for HA  HA diary   Have sleep study  Refills sent  REviewed labs    Return in about 1 month (around 7/23/2022) for after sleep study and MRI . Orders Placed This Encounter   Procedures    MRI BRAIN W WO CONTRAST     Standing Status:   Future     Standing Expiration Date:   6/23/2023     Order Specific Question:   STAT Creatinine as needed:     Answer:   Yes     Order Specific Question:   Reason for exam:     Answer:   new daily HA and getting vistual disturbance     Order Specific Question:   What is the sedation requirement? Answer:   None     Orders Placed This Encounter   Medications    famotidine (PEPCID) 20 MG tablet     Sig: Take 1 tablet by mouth 2 times daily     Dispense:  180 tablet     Refill:  1    etodolac (LODINE) 400 MG tablet     Sig: Take 1 tablet by mouth 2 times daily     Dispense:  180 tablet     Refill:  1       Patient given educational materials - see patient instructions. Discussed use, benefit, and side effects of prescribed medications. All patient questions answered. Pt voiced understanding. Reviewed health maintenance. Instructed to continue current medications, diet and exercise. Patient agreed with treatment plan. Follow up as directed.      Electronically signed by Marley Magdaleno PA-C on 6/23/2022 at 11:23 AM

## 2022-06-28 ENCOUNTER — PATIENT MESSAGE (OUTPATIENT)
Dept: PRIMARY CARE CLINIC | Age: 62
End: 2022-06-28

## 2022-06-28 DIAGNOSIS — K21.9 GASTROESOPHAGEAL REFLUX DISEASE WITHOUT ESOPHAGITIS: ICD-10-CM

## 2022-06-28 RX ORDER — FAMOTIDINE 20 MG/1
20 TABLET, FILM COATED ORAL 2 TIMES DAILY
Qty: 180 TABLET | Refills: 1 | Status: SHIPPED | OUTPATIENT
Start: 2022-06-28 | End: 2022-10-11 | Stop reason: SDUPTHER

## 2022-06-28 NOTE — TELEPHONE ENCOUNTER
From: Towana Galeazzi  To: Rosita Stout  Sent: 6/28/2022 9:19 AM EDT  Subject: Famotidine 20mg    This order was sent to my mail order service but Insurance will not cover this at all. Can you call it into [facility] in ? I will use [employer] and pay $15 instead of $75.  Thanks.

## 2022-06-30 ENCOUNTER — HOSPITAL ENCOUNTER (OUTPATIENT)
Dept: SLEEP CENTER | Age: 62
Discharge: HOME | End: 2022-07-02
Payer: COMMERCIAL

## 2022-06-30 VITALS — BODY MASS INDEX: 37.69 KG/M2 | HEIGHT: 60 IN | WEIGHT: 192 LBS

## 2022-06-30 DIAGNOSIS — R06.89 GASPING FOR BREATH: ICD-10-CM

## 2022-06-30 DIAGNOSIS — R53.82 CHRONIC FATIGUE: ICD-10-CM

## 2022-06-30 PROCEDURE — 95810 POLYSOM 6/> YRS 4/> PARAM: CPT

## 2022-07-01 ENCOUNTER — HOSPITAL ENCOUNTER (OUTPATIENT)
Dept: MRI IMAGING | Age: 62
Discharge: HOME | End: 2022-07-03
Payer: COMMERCIAL

## 2022-07-01 DIAGNOSIS — G44.52 NEW DAILY PERSISTENT HEADACHE: ICD-10-CM

## 2022-07-01 LAB
CREAT SERPL-MCNC: 0.61 MG/DL (ref 0.5–0.9)
GFR AFRICAN AMERICAN: >60 ML/MIN
GFR NON-AFRICAN AMERICAN: >60 ML/MIN
GFR SERPL CREATININE-BSD FRML MDRD: NORMAL ML/MIN/{1.73_M2}

## 2022-07-01 PROCEDURE — 36415 COLL VENOUS BLD VENIPUNCTURE: CPT

## 2022-07-01 PROCEDURE — 82565 ASSAY OF CREATININE: CPT

## 2022-07-01 PROCEDURE — 2580000003 HC RX 258: Performed by: PHYSICIAN ASSISTANT

## 2022-07-01 PROCEDURE — 70553 MRI BRAIN STEM W/O & W/DYE: CPT

## 2022-07-01 PROCEDURE — A9579 GAD-BASE MR CONTRAST NOS,1ML: HCPCS | Performed by: PHYSICIAN ASSISTANT

## 2022-07-01 PROCEDURE — 6360000004 HC RX CONTRAST MEDICATION: Performed by: PHYSICIAN ASSISTANT

## 2022-07-01 RX ORDER — SODIUM CHLORIDE 0.9 % (FLUSH) 0.9 %
10 SYRINGE (ML) INJECTION PRN
Status: DISCONTINUED | OUTPATIENT
Start: 2022-07-01 | End: 2022-07-04 | Stop reason: HOSPADM

## 2022-07-01 RX ADMIN — GADOTERIDOL 17 ML: 279.3 INJECTION, SOLUTION INTRAVENOUS at 14:08

## 2022-07-01 RX ADMIN — SODIUM CHLORIDE, PRESERVATIVE FREE 10 ML: 5 INJECTION INTRAVENOUS at 14:08

## 2022-07-06 ENCOUNTER — TELEPHONE (OUTPATIENT)
Dept: ONCOLOGY | Age: 62
End: 2022-07-06

## 2022-07-06 NOTE — TELEPHONE ENCOUNTER
[facility] Program   Hereditary Cancer Risk Assessment     Name: Hipolito Wren  YOB: 1960  Date of Results Disclosure: 07/06/22      HISTORY   Ms. Lydia Shepherd was seen for genetic counseling at the request of Rosa Black DO due to her family history of cancer. At that time, Ms. Lydia Shepherd chose to pursue genetic testing via the CancerNext Expanded + RNA gene panel. These results were discussed with Ms. Espinal via telephone. A summary of Ms. Katherine Pichardo results and recommendations are below. RESULTS  [facility] [employer] CancerNext-Expanded Panel + RNAinsight: NEGATIVE - NO CLINICALLY SIGNIFICANT MUTATIONS DETECTED   This panel included the analysis of 77 genes associated with hereditary cancer including: AIP, ALK, APC, MARCIO, BAP1, BARD1, BLM, BMPR1A, BRCA1, BRCA2, BRIP1, CDC73, CDH1, CDK4, CDKN1B, CDKN2A, CHEK2, CTNNA1, DICER1, EGFR, EGLN1, EPCAM, FANCC, FH, FLCN, GALNT12, GREM1, HOXB13, KIF1B, KIT1, LZTR1, MAX, MEN1, MET, MITF, MLH1, MSH2, MSH3, MSH6, MUTYH, NBN, NF1, NF2, NTHL1, PALB2, PDGFRA, PHOX2B, PMS2, POLD1, POLE, POT1, WNOYT7G, PTCH1, PTEN, RAD51C, RAD51D, RB1, RECQL, RET, SDHA, SDHAF2, SDHB, SDHC, ,SDHD, SMAD4, SMARCA4, SMARCB1, SMARCE1, STK11, SUFU, HSQC754, TP53, TSC1, TSC2, VHL, and XRCC2. In addition, no clinically relevant aberrant RNA transcripts were detected in select genes. Please refer to genetic test report for technical details. We discussed that Ms. Espinal's negative test result greatly reduces the likelihood that she carries a hereditary gene mutation. However, it is possible that her family history of cancer is due to a hereditary mutation which she did not inherit. It is also possible that her family history of cancer may be due to a gene for which testing was not performed or which has yet to be discovered. RECOMMENDATIONS  1) Ms. Espinal should continue general population cancer screening guidelines as directed by her physicians.      RECOMMENDATIONS FOR FAMILY MEMBERS

## 2022-07-15 PROBLEM — R53.82 CHRONIC FATIGUE: Status: ACTIVE | Noted: 2022-07-15

## 2022-07-15 PROBLEM — R06.89 GASPING FOR BREATH: Status: ACTIVE | Noted: 2022-07-15

## 2022-07-15 LAB — STATUS: NORMAL

## 2022-07-15 PROCEDURE — 95810 POLYSOM 6/> YRS 4/> PARAM: CPT | Performed by: PSYCHIATRY & NEUROLOGY

## 2022-07-18 DIAGNOSIS — G47.33 OSA (OBSTRUCTIVE SLEEP APNEA): Primary | ICD-10-CM

## 2022-07-18 NOTE — RESULT ENCOUNTER NOTE
Please advise that Sleep study shows severe ZAHRA and she needs to f/u with sleep specialist. REferral entered

## 2022-07-25 ENCOUNTER — TELEMEDICINE (OUTPATIENT)
Dept: PRIMARY CARE CLINIC | Age: 62
End: 2022-07-25
Payer: COMMERCIAL

## 2022-07-25 DIAGNOSIS — L23.9 ALLERGIC CONTACT DERMATITIS, UNSPECIFIED TRIGGER: Primary | ICD-10-CM

## 2022-07-25 PROCEDURE — 99213 OFFICE O/P EST LOW 20 MIN: CPT | Performed by: PHYSICIAN ASSISTANT

## 2022-07-25 RX ORDER — PREDNISONE 20 MG/1
20 TABLET ORAL 2 TIMES DAILY
Qty: 10 TABLET | Refills: 0 | Status: SHIPPED | OUTPATIENT
Start: 2022-07-25 | End: 2022-07-30

## 2022-07-25 RX ORDER — TRIAMCINOLONE ACETONIDE 5 MG/G
CREAM TOPICAL
Qty: 15 G | Refills: 0 | Status: SHIPPED | OUTPATIENT
Start: 2022-07-25 | End: 2022-08-04 | Stop reason: SDUPTHER

## 2022-07-25 SDOH — ECONOMIC STABILITY: FOOD INSECURITY: WITHIN THE PAST 12 MONTHS, THE FOOD YOU BOUGHT JUST DIDN'T LAST AND YOU DIDN'T HAVE MONEY TO GET MORE.: NEVER TRUE

## 2022-07-25 SDOH — ECONOMIC STABILITY: FOOD INSECURITY: WITHIN THE PAST 12 MONTHS, YOU WORRIED THAT YOUR FOOD WOULD RUN OUT BEFORE YOU GOT MONEY TO BUY MORE.: NEVER TRUE

## 2022-07-25 ASSESSMENT — ENCOUNTER SYMPTOMS
VOMITING: 0
SHORTNESS OF BREATH: 0
ABDOMINAL PAIN: 0
COLOR CHANGE: 1
CONSTIPATION: 0
CHEST TIGHTNESS: 0
SINUS PRESSURE: 0
PHOTOPHOBIA: 0
EYE DISCHARGE: 0
RHINORRHEA: 0
SORE THROAT: 0
COUGH: 0
DIARRHEA: 0
ABDOMINAL DISTENTION: 0

## 2022-07-25 ASSESSMENT — SOCIAL DETERMINANTS OF HEALTH (SDOH): HOW HARD IS IT FOR YOU TO PAY FOR THE VERY BASICS LIKE FOOD, HOUSING, MEDICAL CARE, AND HEATING?: NOT HARD AT ALL

## 2022-07-25 NOTE — PROGRESS NOTES
Visit Information    Have you changed or started any medications since your last visit including any over-the-counter medicines, vitamins, or herbal medicines? no   Are you having any side effects from any of your medications? -  no  Have you stopped taking any of your medications? Is so, why? -  no    Have you seen any other physician or provider since your last visit? No  Have you had any other diagnostic tests since your last visit? No  Have you been seen in the emergency room and/or had an admission to a hospital since we last saw you? No  Have you had your routine dental cleaning in the past 6 months? yes -     Have you activated your [employer] account? If not, what are your barriers? Yes     Patient Care Team:  Teressa Fox PA-C as PCP - General (Physician Assistant)  Teressa Fox PA-C as PCP - [facility] Provider    Medical History Review  Past Medical, Family, and Social History reviewed and does contribute to the patient presenting condition    Health Maintenance   Topic Date Due    HIV screen  Never done    Shingles vaccine (2 of 3) 02/03/2016    Cervical cancer screen  02/22/2021    COVID-19 Vaccine (3 - Booster for Pfizer series) 09/07/2021    Flu vaccine (1) 09/01/2022    Breast cancer screen  09/02/2022    Depression Screen  06/23/2023    DTaP/Tdap/Td vaccine (4 - Td or Tdap) 01/13/2024    Diabetes screen  03/04/2025    Lipids  10/03/2025    Colorectal Cancer Screen  02/13/2028    Hepatitis C screen  Completed    Hepatitis A vaccine  Aged Out    Hepatitis B vaccine  Aged Out    Hib vaccine  Aged Out    Meningococcal (ACWY) vaccine  Aged Out    Pneumococcal 0-64 years Vaccine  Aged Out         [address] [place] [address] PRIMARY CARE  [address] [place] [address] [address] [address]  Dept: [phone]    Melina Dwyer is a 60 y.o. female Established patient, who presents today for her medical conditions/complaints as noted below.       Chief Complaint   Patient presents with Rash     LEFT ARM AND LEFT THIGH x7 DAYS- TRIED OTC CREAMS, BENADRYL       HPI:     Rash  Pertinent negatives include no congestion, cough, diarrhea, fever, rhinorrhea, shortness of breath, sore throat or vomiting. : The patient has had a rash which she believes that was related to yard work[place] Has tried OTC medication without relief. On her wrist and thigh. Spreading. Mary Carmen Sinclair, was evaluated through a synchronous (real-time) audio-video encounter. The patient (or guardian if applicable) is aware that this is a billable service, which includes applicable co-pays. This Virtual Visit was conducted with patient's (and/or legal guardian's) consent. The visit was conducted pursuant to the emergency declaration under the [address] [place] [address], [address] [place] [address] authority and the [employer] General Act. Patient identification was verified, and a caregiver was present when appropriate. The patient was located at Home: [address] [address]. Provider was located at [place] [address]): [facility] [address],  [facility] [address].. Total time spent for this encounter:  20 min     --BABAR Villalobos on 7/25/2022 at 11:20 AM    An electronic signature was used to authenticate this note.       Reviewed prior notes None  Reviewed previous Labs    LDL Cholesterol (mg/dL)   Date Value   10/03/2020 104     LDL Calculated (mg/dL)   Date Value   12/07/2017 138       (goal LDL is <100)   AST (U/L)   Date Value   03/04/2022 30     ALT (U/L)   Date Value   03/04/2022 39 (H)     BUN (mg/dL)   Date Value   03/04/2022 17     TSH (mIU/L)   Date Value   06/22/2021 0.47     BP Readings from Last 3 Encounters:   06/23/22 132/88   04/22/22 130/80   04/13/22 116/76          (goal 120/80)    Past Medical History:   Diagnosis Date    Arthritis     Decreased vision of right eye     Dental bridge present     permanent left upper    Deviated septum     Dry skin     GERD (gastroesophageal reflux disease)     History of chest pain     past,stress test negative,poss acid reflux    Sinus infection 2018    on antibiotics x 10 days,better no cough or fever    Sleep apnea     mild no machine    Snores     Tingling     Urgency of urination     occas      Past Surgical History:   Procedure Laterality Date    CATARACT REMOVAL Bilateral     COLONOSCOPY      EYE SURGERY      EYE SURGERY Bilateral     YAG- right x3, left x2    KNEE SURGERY Right 2013    orif patella    MOUTH SURGERY      sublingual tonsil partial removal    [place] RELEAS VITREOUS,SUBRET/CHOROID FLUID Right 2018    VITRECTOMY 23 GAUGE, membrane peel performed by Yovana Flaherty MD at [facility]    PRE-MALIGNANT / [address]      x2    VITRECTOMY Right 2018    WISDOM TOOTH EXTRACTION         Family History   Problem Relation Age of Onset    Cancer Mother         breast IDC, HER2+, herceptin, later developed inflammatory breast cancer, later 3rd type of breast cancer mid 62s    Breast Cancer Mother 76    Bilateral breast cancer Mother     Heart Disease Father     Diabetes Father     Prostate Cancer Father         diagnosed older    Cancer Paternal Grandmother         breast cancer 76s    Cancer Other         daughter of dad's brother,  of breast cancer in her 25s    Cancer Other         maternal grandmother's mother, radical mastectomy treated in zahra, lived to 80y       Social History     Tobacco Use    Smoking status: Never    Smokeless tobacco: Never   Substance Use Topics    Alcohol use: Yes     Alcohol/week: 0.0 standard drinks      Current Outpatient Medications   Medication Sig Dispense Refill    predniSONE (DELTASONE) 20 MG tablet Take 1 tablet by mouth in the morning and 1 tablet before bedtime. Do all this for 5 days. 10 tablet 0    triamcinolone (ARISTOCORT) 0.5 % cream Apply topically 3 times daily.  15 g 0    famotidine (PEPCID) 20 MG exercise. Patient agreed with treatment plan. Follow up as directed.      Electronically signed by BABAR Rolon on 7/25/2022 at 11:20 AM

## 2022-08-04 ENCOUNTER — OFFICE VISIT (OUTPATIENT)
Dept: PRIMARY CARE CLINIC | Age: 62
End: 2022-08-04
Payer: COMMERCIAL

## 2022-08-04 VITALS
SYSTOLIC BLOOD PRESSURE: 132 MMHG | WEIGHT: 190.2 LBS | DIASTOLIC BLOOD PRESSURE: 78 MMHG | HEART RATE: 80 BPM | OXYGEN SATURATION: 98 % | BODY MASS INDEX: 37.34 KG/M2 | HEIGHT: 60 IN

## 2022-08-04 DIAGNOSIS — G44.89 OTHER HEADACHE SYNDROME: ICD-10-CM

## 2022-08-04 DIAGNOSIS — L23.9 ALLERGIC CONTACT DERMATITIS, UNSPECIFIED TRIGGER: ICD-10-CM

## 2022-08-04 DIAGNOSIS — G47.33 OBSTRUCTIVE SLEEP APNEA SYNDROME: Primary | ICD-10-CM

## 2022-08-04 PROCEDURE — 99214 OFFICE O/P EST MOD 30 MIN: CPT | Performed by: PHYSICIAN ASSISTANT

## 2022-08-04 RX ORDER — TRIAMCINOLONE ACETONIDE 5 MG/G
CREAM TOPICAL
Qty: 45 G | Refills: 0 | Status: SHIPPED | OUTPATIENT
Start: 2022-08-04

## 2022-08-04 NOTE — PROGRESS NOTES
[facility] PRIMARY CARE  [address]  [facility] [address]  Dept: Melyssa Dupont is a 60 y.o. female who presents today for her medical conditions/complaints as noted below. Chief Complaint   Patient presents with    Headache     Patient said she gets headaches more when she lays down or wakes up with them. Patient said she tried to keep a HA diary but did not do a good job with it. Other     Patient had Sleep Study and MRI done, results in chart          HPI:     Headache     Other  Associated symptoms include headaches. Sleep study showed sever sleep apnea  MRI was normal  HA likely happening from the sleep apnea  Sleep specialist--has an appt with Dr Venice Ansari 8/22    Has throat closing/dryness during dry and will gasp. Also has a deviated septum per ENT    Had been seen [facility] for allergic contact derm and given a steroid cream.  Was healing and now has smaller, itchy patch on opposite leg.        LDL Cholesterol (mg/dL)   Date Value   10/03/2020 104     LDL Calculated (mg/dL)   Date Value   12/07/2017 138       (goal LDL is <100)   AST (U/L)   Date Value   03/04/2022 30     ALT (U/L)   Date Value   03/04/2022 39 (H)     BUN (mg/dL)   Date Value   03/04/2022 17     BP Readings from Last 3 Encounters:   08/04/22 132/78   06/23/22 132/88   04/22/22 130/80          (goal 120/80)    Past Medical History:   Diagnosis Date    Arthritis     Decreased vision of right eye     Dental bridge present     permanent left upper    Deviated septum     Dry skin     GERD (gastroesophageal reflux disease)     History of chest pain     past,stress test negative,poss acid reflux    Sinus infection 01/04/2018    on antibiotics x 10 days,better no cough or fever    Sleep apnea     mild no machine    Snores     Tingling     Urgency of urination     occas      Past Surgical History:   Procedure Laterality Date    CATARACT REMOVAL Bilateral     COLONOSCOPY      EYE SURGERY      EYE SURGERY Bilateral     YAG- right x3, left x2    KNEE SURGERY Right 2013    orif patella    MOUTH SURGERY      sublingual tonsil partial removal    LA RELEAS VITREOUS,SUBRET/CHOROID FLUID Right 2018    VITRECTOMY 23 GAUGE, membrane peel performed by Sage Waggoner MD at [address] / [address]      x2    VITRECTOMY Right 2018    WISDOM TOOTH EXTRACTION         Family History   Problem Relation Age of Onset    Cancer Mother         breast IDC, HER2+, herceptin, later developed inflammatory breast cancer, later 3rd type of breast cancer mid 62s    Breast Cancer Mother 76    Bilateral breast cancer Mother     Heart Disease Father     Diabetes Father     Prostate Cancer Father         diagnosed older    Cancer Paternal Grandmother         breast cancer 76s    Cancer Other         daughter of dad's brother,  of breast cancer in her 25s    Cancer Other         maternal grandmother's mother, radical mastectomy treated in [place], lived to 80y       Social History     Tobacco Use    Smoking status: Never    Smokeless tobacco: Never   Substance Use Topics    Alcohol use: Yes     Alcohol/week: 0.0 standard drinks      Current Outpatient Medications   Medication Sig Dispense Refill    triamcinolone (ARISTOCORT) 0.5 % cream Apply topically 3 times daily. 45 g 0    famotidine (PEPCID) 20 MG tablet Take 1 tablet by mouth 2 times daily 180 tablet 1    etodolac (LODINE) 400 MG tablet Take 1 tablet by mouth 2 times daily 180 tablet 1    pregabalin (LYRICA) 50 MG capsule Take 1 capsule by mouth 2 times daily for 90 days.  180 capsule 1    montelukast (SINGULAIR) 10 MG tablet TAKE 1 TABLET NIGHTLY 90 tablet 3    Cholecalciferol (VITAMIN D3) 1.25 MG (12505 UT) CAPS Take by mouth      magnesium 200 MG TABS tablet Take 200 mg by mouth daily      Ascorbic Acid (VITAMIN C) 1000 MG tablet Take 1,000 mg by mouth 2 times daily       aspirin 325 MG tablet Take 325 mg by mouth daily No current facility-administered medications for this visit. Allergies   Allergen Reactions    Sulfa Antibiotics Itching       Health Maintenance   Topic Date Due    HIV screen  Never done    Shingles vaccine (2 of 3) 02/03/2016    Cervical cancer screen  02/22/2021    COVID-19 Vaccine (3 - Booster for Pfizer series) 09/07/2021    Breast cancer screen  09/02/2022    Flu vaccine (1) 09/01/2022    Depression Screen  06/23/2023    DTaP/Tdap/Td vaccine (4 - Td or Tdap) 01/13/2024    Diabetes screen  03/04/2025    Lipids  10/03/2025    Colorectal Cancer Screen  02/13/2028    Hepatitis C screen  Completed    Hepatitis A vaccine  Aged Out    Hepatitis B vaccine  Aged Out    Hib vaccine  Aged Out    Meningococcal (ACWY) vaccine  Aged Out    Pneumococcal 0-64 years Vaccine  Aged Out       Subjective:      Review of Systems   Neurological:  Positive for headaches. Psychiatric/Behavioral:  Positive for sleep disturbance. Objective:     /78   Pulse 80   Ht 5' (1.524 m)   Wt 190 lb 3.2 oz (86.3 kg)   SpO2 98%   BMI 37.15 kg/m²   Physical Exam  Vitals and nursing note reviewed. Constitutional:       Appearance: Normal appearance. Cardiovascular:      Rate and Rhythm: Normal rate and regular rhythm. Heart sounds: Normal heart sounds. Pulmonary:      Effort: Pulmonary effort is normal.      Breath sounds: Normal breath sounds. No wheezing, rhonchi or rales. Skin:     Findings: Rash (on inner right thigh--flat red) present. Neurological:      Mental Status: She is alert. Assessment:       Diagnosis Orders   1. Obstructive sleep apnea syndrome        2. Allergic contact dermatitis, unspecified trigger  triamcinolone (ARISTOCORT) 0.5 % cream      3. Other headache syndrome             Plan:    Referred to sleep specialist and has appt 8/22  Reviewed sleep study and MRI  I believe the HA are likely related to the sleep apnea  Use a wedge pillow now.    Schedule well woman exam  Refill sent of triamcinolone  Return for Well woman. No orders of the defined types were placed in this encounter. Orders Placed This Encounter   Medications    triamcinolone (ARISTOCORT) 0.5 % cream     Sig: Apply topically 3 times daily. Dispense:  45 g     Refill:  0         Patient given educational materials - see patient instructions. Discussed use, benefit, and side effects of prescribed medications. All patient questions answered. Pt voiced understanding. Reviewed health maintenance. Instructed to continue current medications, diet and exercise. Patient agreed with treatment plan. Follow up as directed.      Electronically signed by Gretel Zuniga PA-C on 8/4/2022 at 9:19 AM

## 2022-09-12 ENCOUNTER — OFFICE VISIT (OUTPATIENT)
Dept: PRIMARY CARE CLINIC | Age: 62
End: 2022-09-12
Payer: COMMERCIAL

## 2022-09-12 ENCOUNTER — HOSPITAL ENCOUNTER (OUTPATIENT)
Age: 62
Setting detail: SPECIMEN
Discharge: HOME | End: 2022-09-12

## 2022-09-12 VITALS
DIASTOLIC BLOOD PRESSURE: 80 MMHG | BODY MASS INDEX: 38.28 KG/M2 | WEIGHT: 195 LBS | OXYGEN SATURATION: 97 % | HEIGHT: 60 IN | HEART RATE: 87 BPM | SYSTOLIC BLOOD PRESSURE: 132 MMHG

## 2022-09-12 DIAGNOSIS — D48.5 NEOPLASM OF UNCERTAIN BEHAVIOR OF SKIN: ICD-10-CM

## 2022-09-12 DIAGNOSIS — R39.9 URINARY TRACT INFECTION SYMPTOMS: ICD-10-CM

## 2022-09-12 DIAGNOSIS — Z12.31 SCREENING MAMMOGRAM FOR BREAST CANCER: ICD-10-CM

## 2022-09-12 DIAGNOSIS — Z12.4 CERVICAL CANCER SCREENING: Primary | ICD-10-CM

## 2022-09-12 DIAGNOSIS — Z23 NEED FOR VACCINATION: ICD-10-CM

## 2022-09-12 DIAGNOSIS — N89.8 VAGINAL ITCHING: ICD-10-CM

## 2022-09-12 PROCEDURE — 90674 CCIIV4 VAC NO PRSV 0.5 ML IM: CPT | Performed by: PHYSICIAN ASSISTANT

## 2022-09-12 PROCEDURE — 99396 PREV VISIT EST AGE 40-64: CPT | Performed by: PHYSICIAN ASSISTANT

## 2022-09-12 PROCEDURE — 90471 IMMUNIZATION ADMIN: CPT | Performed by: PHYSICIAN ASSISTANT

## 2022-09-12 ASSESSMENT — PATIENT HEALTH QUESTIONNAIRE - PHQ9
1. LITTLE INTEREST OR PLEASURE IN DOING THINGS: 0
SUM OF ALL RESPONSES TO PHQ QUESTIONS 1-9: 0
SUM OF ALL RESPONSES TO PHQ QUESTIONS 1-9: 0
2. FEELING DOWN, DEPRESSED OR HOPELESS: 0
SUM OF ALL RESPONSES TO PHQ QUESTIONS 1-9: 0
SUM OF ALL RESPONSES TO PHQ9 QUESTIONS 1 & 2: 0
SUM OF ALL RESPONSES TO PHQ QUESTIONS 1-9: 0

## 2022-09-12 NOTE — PROGRESS NOTES
[facility] Primary Care  [address]  Phone: [phone]  Fax: [phone]    Flora Tomas is a 60 y.o. female who presents today for her medicalconditions/complaints as noted below. Concerned for UTI as she has been experiencing slight discomfort with urination, urgency, and frequency for around a week. Patient also concerned with a brenna on elbow which can be annoying to her. Chief Complaint   Patient presents with    Gynecologic Exam     Patients last pap 2016- normal. Pt had mammogram done. Allergies   Allergen Reactions    Sulfa Antibiotics Itching     Current Outpatient Medications   Medication Sig Dispense Refill    famotidine (PEPCID) 20 MG tablet Take 1 tablet by mouth 2 times daily 180 tablet 1    etodolac (LODINE) 400 MG tablet Take 1 tablet by mouth 2 times daily 180 tablet 1    pregabalin (LYRICA) 50 MG capsule Take 1 capsule by mouth 2 times daily for 90 days. 180 capsule 1    montelukast (SINGULAIR) 10 MG tablet TAKE 1 TABLET NIGHTLY 90 tablet 3    Cholecalciferol (VITAMIN D3) 1.25 MG (95049 UT) CAPS Take by mouth      magnesium 200 MG TABS tablet Take 200 mg by mouth daily      Ascorbic Acid (VITAMIN C) 1000 MG tablet Take 1,000 mg by mouth 2 times daily       aspirin 325 MG tablet Take 325 mg by mouth daily       triamcinolone (ARISTOCORT) 0.5 % cream Apply topically 3 times daily. (Patient not taking: Reported on 9/12/2022) 45 g 0     No current facility-administered medications for this visit.      Past Medical History:   Diagnosis Date    Arthritis     Decreased vision of right eye     Dental bridge present     permanent left upper    Deviated septum     Dry skin     GERD (gastroesophageal reflux disease)     History of chest pain     past,stress test negative,poss acid reflux    Sinus infection 01/04/2018    on antibiotics x 10 days,better no cough or fever    Sleep apnea     mild no machine    Snores     Tingling     Urgency of urination     occas     Social History     Socioeconomic History    Marital status:      Spouse name: Not on file    Number of children: Not on file    Years of education: Not on file    Highest education level: Not on file   Occupational History    Not on file   Tobacco Use    Smoking status: Never    Smokeless tobacco: Never   Substance and Sexual Activity    Alcohol use: Yes     Alcohol/week: 0.0 standard drinks    Drug use: No    Sexual activity: Not on file   Other Topics Concern    Not on file   Social History Narrative    Not on file     Social Determinants of Health     Financial Resource Strain: Low Risk     Difficulty of Paying Living Expenses: Not hard at all   Food Insecurity: No Food Insecurity    Worried About Running Out of Food in the Last Year: Never true    Ran Out of Food in the Last Year: Never true   Transportation Needs: Not on file   Physical Activity: Not on file   Stress: Not on file   Social Connections: Not on file   Intimate Partner Violence: Not on file   Housing Stability: Not on file       HPI:     Last Pap:  approximate date 2016 and was normal  Last Menstrual Period:No LMP recorded. Patient is postmenopausal.  Problems with menstruation: no  Hysterectomy:  No   Ovaries:  Yes  Signs of Menopause:  Yes  post menopausal   Pregnancies:  Yes   2 vaginal   SexualActivity:  No   Current:  not currently    Lifetime:  1  STD History:  none  Birth Control:  No  Breast Concerns:  No  Vaginal Issus:  Yes itchiness that comes and goes   Urinary Problems:  No  Bowel Problems:  No          Last Colonoscopy:  2018  Last Mammogram: approximate date 2019 and was normal   Last Dexa Scan: never had one    HPV Vaccination:  no     Objective:   Review of Systems   Constitutional:  Negative for chills, fatigue and fever. Endocrine: Positive for cold intolerance (whole life) and heat intolerance (whole life). Genitourinary:  Positive for decreased urine volume, dysuria, frequency and urgency. Negative for difficulty urinating, dyspareunia, genital sores, hematuria, menstrual problem, pelvic pain, vaginal bleeding, vaginal discharge and vaginal pain. Vaginal itchiness, Urinary odor      Musculoskeletal:  Negative for myalgias. Skin:  Negative for rash. Psychiatric/Behavioral:  Positive for sleep disturbance (going through sleep study). Negative for dysphoric mood. The patient is not nervous/anxious. /80   Pulse 87   Ht 5' (1.524 m)   Wt 195 lb (88.5 kg)   SpO2 97%   BMI 38.08 kg/m²   Physical Exam  Vitals and nursing note reviewed. Exam conducted with a chaperone present. Constitutional:       Appearance: Normal appearance. Cardiovascular:      Rate and Rhythm: Normal rate and regular rhythm. Heart sounds: Normal heart sounds. Pulmonary:      Effort: Pulmonary effort is normal.      Breath sounds: Normal breath sounds. No wheezing, rhonchi or rales. Chest:   Breasts:     Breasts are symmetrical.      Right: No inverted nipple, mass, nipple discharge, skin change, tenderness or axillary adenopathy. Left: No inverted nipple, mass, nipple discharge, skin change, tenderness or axillary adenopathy. Genitourinary:     General: Normal vulva. Pubic Area: No rash. Labia:         Right: No rash, tenderness or lesion. Left: No rash, tenderness or lesion. Urethra: No prolapse or urethral swelling. Vagina: Vaginal discharge present. Cervix: Normal.      Uterus: Normal.       Adnexa: Right adnexa normal and left adnexa normal.      Rectum: Normal. Guaiac result negative. Comments: Small amount of white discharge  Lymphadenopathy:      Upper Body:      Right upper body: No axillary or pectoral adenopathy. Left upper body: No axillary or pectoral adenopathy. Skin:     Comments: Appx 8mm hyperkeratotic well circumscribed lesion above right elbow   Neurological:      Mental Status: She is alert.        Assessment:      Diagnosis Orders   1. Cervical cancer screening  PAP Smear      2. Need for vaccination  Influenza, FLUCELVAX, (age 10 mo+), IM, PF, 0.5 mL      3. Vaginal itching  Vaginal Pathogens Probes *A      4. Urinary tract infection symptoms        5. Neoplasm of uncertain behavior of skin        6. Screening mammogram for breast cancer  RIC DIGITAL SCREEN W OR WO CAD BILATERAL        Orders Placed This Encounter   Procedures    RIC DIGITAL SCREEN W OR WO CAD BILATERAL     Standing Status:   Future     Standing Expiration Date:   11/12/2023    Influenza, FLUCELVAX, (age 10 mo+), IM, PF, 0.5 mL    PAP Smear     Patient History:    No LMP recorded. Patient is postmenopausal.  OBGYN Status: Postmenopausal  Past Surgical History:  No date: CATARACT REMOVAL; Bilateral  No date: COLONOSCOPY  No date: EYE SURGERY  No date: EYE SURGERY; Bilateral      Comment:  YAG- right x3, left x2  2013: KNEE SURGERY; Right      Comment:  orif patella  No date: MOUTH SURGERY      Comment:  sublingual tonsil partial removal  01/17/2018: AL RELEAS VITREOUS,SUBRET/CHOROID FLUID; Right      Comment:  VITRECTOMY 23 GAUGE, membrane peel performed by Vin Willson MD at [place] [address]  No date: PRE-MALIGNANT / BENIGN SKIN LESION EXCISION      Comment:  x2  01/17/2018: VITRECTOMY; Right  No date: WISDOM TOOTH EXTRACTION      Social History    Tobacco Use      Smoking status: Never      Smokeless tobacco: Never       Standing Status:   Future     Standing Expiration Date:   9/12/2023     Order Specific Question:   Collection Type     Answer: Thin Prep     Order Specific Question:   Prior Abnormal Pap Test     Answer:   No     Order Specific Question:   Screening or Diagnostic     Answer:   Screening     Order Specific Question:   HPV Requested?      Answer:   Yes     Order Specific Question:   High Risk Patient     Answer:   N/A    Vaginal Pathogens Probes *A         Plan:   Pap specimen taken  Vaginal culture taken  Get mammogram for ---call Christine Mishra [facility]  Urine negative for infection, pending vaginal cultures for urinary symptoms. If symptoms fail to improve RTO   Flu vax today   Next colonoscopy 2/2023  Return for Friday for a shave biopsy on right elbow.  .    Reviewed self breast exam.        Electronically signed by Maria Tierney PA-C on 9/12/2022 at 11:04 AM

## 2022-09-13 LAB
CANDIDA SPECIES, DNA PROBE: NEGATIVE
GARDNERELLA VAGINALIS, DNA PROBE: NEGATIVE
SOURCE: NORMAL
TRICHOMONAS VAGINALIS DNA: NEGATIVE

## 2022-09-15 LAB
HPV SAMPLE: NORMAL
HPV, GENOTYPE 16: NOT DETECTED
HPV, GENOTYPE 18: NOT DETECTED
HPV, HIGH RISK OTHER: NOT DETECTED
HPV, INTERPRETATION: NORMAL
SPECIMEN DESCRIPTION: NORMAL

## 2022-09-19 LAB — CYTOLOGY REPORT: NORMAL

## 2022-09-23 ENCOUNTER — HOSPITAL ENCOUNTER (OUTPATIENT)
Age: 62
Setting detail: SPECIMEN
Discharge: HOME | End: 2022-09-23

## 2022-09-23 ENCOUNTER — PROCEDURE VISIT (OUTPATIENT)
Dept: PRIMARY CARE CLINIC | Age: 62
End: 2022-09-23
Payer: COMMERCIAL

## 2022-09-23 VITALS
BODY MASS INDEX: 38.48 KG/M2 | WEIGHT: 196 LBS | HEART RATE: 100 BPM | SYSTOLIC BLOOD PRESSURE: 136 MMHG | DIASTOLIC BLOOD PRESSURE: 84 MMHG | OXYGEN SATURATION: 97 % | HEIGHT: 60 IN

## 2022-09-23 DIAGNOSIS — D48.5 NEOPLASM OF UNCERTAIN BEHAVIOR OF SKIN: Primary | ICD-10-CM

## 2022-09-23 PROCEDURE — 11102 TANGNTL BX SKIN SINGLE LES: CPT | Performed by: PHYSICIAN ASSISTANT

## 2022-09-23 PROCEDURE — 99999 PR OFFICE/OUTPT VISIT,PROCEDURE ONLY: CPT | Performed by: PHYSICIAN ASSISTANT

## 2022-09-23 NOTE — PROGRESS NOTES
Skin Biopsy Procedure Note  9/23/2022  Zaki Al  1960    /84   Pulse 100   Ht 5' (1.524 m)   Wt 196 lb (88.9 kg)   SpO2 97%   BMI 38.28 kg/m²   VITALS REVIEWED    Allergies   Allergen Reactions    Sulfa Antibiotics Itching       Chief Complaint   Patient presents with    Procedure     Shave bx above right elbow        Lesion:  1. Right elbow        Indication for Biopsy 1: irritated SK      Procedure: The lesion(s) was cleansed with Alcohol.  2% lidocaine with epinephrine was used for local anaesthesia. A 8 mm  shave was used to obtain a specimen. The specimen(s) was    preserved and sent for pathological examination. The biopsy site(s) was  cleansed and hemostasis using ACl and a pressure dressing(s) was achieved with good results. The patient was instructed on wound care. No complications occurred and the patient tolerated the procedure well. Diagnosis Orders   1. Neoplasm of uncertain behavior of skin            Follow Up: Wound care discussed. Keep well moisturized. Watch for signs of infection which would include:  Redness, swelling, fever. If signs appear, please return to office. No follow-ups on file.        Electronically signed by Alejandrina Kam PA-C on 9/23/2022 at 1:53 PM

## 2022-09-27 LAB — DERMATOLOGY PATHOLOGY REPORT: NORMAL

## 2022-10-11 DIAGNOSIS — K21.9 GASTROESOPHAGEAL REFLUX DISEASE WITHOUT ESOPHAGITIS: ICD-10-CM

## 2022-10-11 DIAGNOSIS — M25.50 ARTHRALGIA, UNSPECIFIED JOINT: ICD-10-CM

## 2022-10-11 DIAGNOSIS — M79.2 NERVE PAIN: ICD-10-CM

## 2022-10-11 RX ORDER — FAMOTIDINE 20 MG/1
20 TABLET, FILM COATED ORAL 2 TIMES DAILY
Qty: 180 TABLET | Refills: 1 | Status: SHIPPED | OUTPATIENT
Start: 2022-10-11 | End: 2023-04-09

## 2022-10-11 RX ORDER — PREGABALIN 50 MG/1
50 CAPSULE ORAL 2 TIMES DAILY
Qty: 180 CAPSULE | Refills: 1 | Status: SHIPPED | OUTPATIENT
Start: 2022-10-11 | End: 2023-01-09

## 2022-10-11 RX ORDER — MONTELUKAST SODIUM 10 MG/1
10 TABLET ORAL NIGHTLY
Qty: 90 TABLET | Refills: 1 | Status: SHIPPED | OUTPATIENT
Start: 2022-10-11

## 2022-10-11 RX ORDER — ETODOLAC 400 MG/1
400 TABLET, FILM COATED ORAL 2 TIMES DAILY
Qty: 180 TABLET | Refills: 1 | Status: SHIPPED | OUTPATIENT
Start: 2022-10-11

## 2022-11-18 ENCOUNTER — HOSPITAL ENCOUNTER (OUTPATIENT)
Dept: MAMMOGRAPHY | Age: 62
Discharge: HOME | End: 2022-11-20
Payer: COMMERCIAL

## 2022-11-18 DIAGNOSIS — Z12.31 SCREENING MAMMOGRAM FOR BREAST CANCER: ICD-10-CM

## 2022-11-18 PROCEDURE — 77067 SCR MAMMO BI INCL CAD: CPT

## 2023-01-09 ENCOUNTER — OFFICE VISIT (OUTPATIENT)
Dept: PRIMARY CARE CLINIC | Age: 63
End: 2023-01-09
Payer: COMMERCIAL

## 2023-01-09 VITALS
DIASTOLIC BLOOD PRESSURE: 84 MMHG | WEIGHT: 199.6 LBS | TEMPERATURE: 98.3 F | HEART RATE: 91 BPM | OXYGEN SATURATION: 97 % | BODY MASS INDEX: 39.19 KG/M2 | SYSTOLIC BLOOD PRESSURE: 136 MMHG | HEIGHT: 60 IN

## 2023-01-09 DIAGNOSIS — K13.79: ICD-10-CM

## 2023-01-09 PROCEDURE — 99213 OFFICE O/P EST LOW 20 MIN: CPT | Performed by: PHYSICIAN ASSISTANT

## 2023-01-09 RX ORDER — HYDROCORTISONE
POWDER (GRAM) MISCELLANEOUS
Qty: 240 EACH | Refills: 0 | Status: SHIPPED | OUTPATIENT
Start: 2023-01-09

## 2023-01-09 ASSESSMENT — PATIENT HEALTH QUESTIONNAIRE - PHQ9
2. FEELING DOWN, DEPRESSED OR HOPELESS: 0
SUM OF ALL RESPONSES TO PHQ QUESTIONS 1-9: 0
1. LITTLE INTEREST OR PLEASURE IN DOING THINGS: 0
SUM OF ALL RESPONSES TO PHQ9 QUESTIONS 1 & 2: 0

## 2023-01-09 ASSESSMENT — ENCOUNTER SYMPTOMS
EYES NEGATIVE: 1
TROUBLE SWALLOWING: 1
SINUS PAIN: 0
SHORTNESS OF BREATH: 0
COUGH: 0
SORE THROAT: 0

## 2023-01-09 NOTE — PROGRESS NOTES
[facility] Primary Care  [address]  Phone: [phone]  Fax: [phone]    Roosevelt Serrano is a 60 y.o. female who presents today for her medical conditions/complaintsas noted below. Chief Complaint   Patient presents with    Oral Pain     Patient said the roof of her mouth is really sore. She said she noticed it after eating a salad          HPI:     Oral Pain   Pertinent negatives include no fever. Started Saturday after eating a King salad. Skin sloughed off. Does not remember anything hurting when eating it. Sunday it felt sore further down throat. Ears also feel full. No fever. No tongue or lip swelling. No SOB  Does not feel ill. Current Outpatient Medications   Medication Sig Dispense Refill    hydrocortisone POWD powder Hydrocortisone 80mg,Tetracycline, 1.5g, Elixir of Benedryl 240ml in equal parts, and viscous lidocaine. 1-1 1/2 tsp swish for 2 minutes and spit qid  each 0    pregabalin (LYRICA) 50 MG capsule Take 1 capsule by mouth 2 times daily for 90 days. 180 capsule 1    montelukast (SINGULAIR) 10 MG tablet Take 1 tablet by mouth nightly TAKE 1 TABLET NIGHTLY 90 tablet 1    etodolac (LODINE) 400 MG tablet Take 1 tablet by mouth 2 times daily 180 tablet 1    famotidine (PEPCID) 20 MG tablet Take 1 tablet by mouth 2 times daily 180 tablet 1    triamcinolone (ARISTOCORT) 0.5 % cream Apply topically 3 times daily. 45 g 0    Cholecalciferol (VITAMIN D3) 1.25 MG (09213 UT) CAPS Take by mouth      magnesium 200 MG TABS tablet Take 200 mg by mouth daily      Ascorbic Acid (VITAMIN C) 1000 MG tablet Take 1,000 mg by mouth 2 times daily       aspirin 325 MG tablet Take 325 mg by mouth daily  (Patient not taking: Reported on 1/9/2023)       No current facility-administered medications for this visit.      Allergies   Allergen Reactions    Sulfa Antibiotics Itching       Subjective:      Review of Systems   Constitutional:  Negative for chills, diaphoresis and fever. HENT:  Positive for dental problem (teeth feel sore), mouth sores (roof of mouth is sore) and trouble swallowing. Negative for congestion, sinus pain and sore throat. Eyes: Negative. Respiratory:  Negative for cough and shortness of breath. Cardiovascular:  Negative for chest pain. Hematological:  Negative for adenopathy. Objective:     /84   Pulse 91   Temp 98.3 °F (36.8 °C)   Ht 5' (1.524 m)   Wt 199 lb 9.6 oz (90.5 kg)   SpO2 97%   BMI 38.98 kg/m²   Physical Exam  Vitals and nursing note reviewed. Constitutional:       Appearance: Normal appearance. HENT:      Right Ear: Tympanic membrane, ear canal and external ear normal.      Left Ear: Tympanic membrane, ear canal and external ear normal.      Nose: Nose normal.      Mouth/Throat:      Lips: Pink. Mouth: Mucous membranes are moist.      Dentition: Normal dentition. Palate: Lesions present. Pharynx: Oropharynx is clear. No oropharyngeal exudate or posterior oropharyngeal erythema. Tonsils: No tonsillar exudate or tonsillar abscesses. Comments: Mild swelliing and first layer of skin has sloughed in marked areas  Eyes:      General: Lids are normal.   Cardiovascular:      Rate and Rhythm: Normal rate and regular rhythm. Heart sounds: Normal heart sounds. Pulmonary:      Effort: Pulmonary effort is normal.      Breath sounds: Normal breath sounds. Lymphadenopathy:      Head:      Right side of head: No submental, submandibular, tonsillar, preauricular, posterior auricular or occipital adenopathy. Left side of head: No submental, submandibular, tonsillar, preauricular, posterior auricular or occipital adenopathy. Cervical: No cervical adenopathy. Skin:     Findings: No rash. Neurological:      Mental Status: She is alert and oriented to person, place, and time.    Psychiatric:         Mood and Affect: Mood normal.       Assessment:       Diagnosis Orders 1. Disorder of palate             Plan:    Send note to Octavio Haro MD--no need to postpone surgery  Swish and spit the topical prep to help heal the palate  May need to avoid anchovie in future. Return if symptoms worsen or fail to improve. No orders of the defined types were placed in this encounter. Orders Placed This Encounter   Medications    hydrocortisone POWD powder     Sig: Hydrocortisone 80mg,Tetracycline, 1.5g, Elixir of Benedryl 240ml in equal parts, and viscous lidocaine.   1-1 1/2 tsp swish for 2 minutes and spit qid PRN     Dispense:  240 each     Refill:  0           Electronically signed by Kingsley Walters 1/9/2023 at 4:42 PM

## 2023-01-11 DIAGNOSIS — M79.2 NERVE PAIN: ICD-10-CM

## 2023-01-11 DIAGNOSIS — K21.9 GASTROESOPHAGEAL REFLUX DISEASE WITHOUT ESOPHAGITIS: ICD-10-CM

## 2023-01-11 RX ORDER — PREGABALIN 50 MG/1
50 CAPSULE ORAL 2 TIMES DAILY
Qty: 180 CAPSULE | Refills: 1 | Status: SHIPPED | OUTPATIENT
Start: 2023-01-11 | End: 2023-04-11

## 2023-01-11 RX ORDER — FAMOTIDINE 20 MG/1
20 TABLET, FILM COATED ORAL 2 TIMES DAILY
Qty: 180 TABLET | Refills: 1 | Status: SHIPPED | OUTPATIENT
Start: 2023-01-11 | End: 2023-07-10

## 2023-01-24 DIAGNOSIS — K21.9 GASTROESOPHAGEAL REFLUX DISEASE WITHOUT ESOPHAGITIS: ICD-10-CM

## 2023-01-24 RX ORDER — FAMOTIDINE 20 MG/1
TABLET, FILM COATED ORAL
Qty: 180 TABLET | Refills: 0 | OUTPATIENT
Start: 2023-01-24

## 2023-02-23 DIAGNOSIS — M79.2 NERVE PAIN: ICD-10-CM

## 2023-02-24 DIAGNOSIS — K21.9 GASTROESOPHAGEAL REFLUX DISEASE WITHOUT ESOPHAGITIS: ICD-10-CM

## 2023-02-24 RX ORDER — PREGABALIN 50 MG/1
50 CAPSULE ORAL 2 TIMES DAILY
Qty: 180 CAPSULE | Refills: 1 | Status: SHIPPED | OUTPATIENT
Start: 2023-02-24 | End: 2023-05-25

## 2023-02-24 RX ORDER — FAMOTIDINE 20 MG/1
20 TABLET, FILM COATED ORAL 2 TIMES DAILY
Qty: 180 TABLET | Refills: 1 | Status: SHIPPED | OUTPATIENT
Start: 2023-02-24 | End: 2023-08-23

## 2023-02-28 ENCOUNTER — TELEPHONE (OUTPATIENT)
Dept: PRIMARY CARE CLINIC | Age: 63
End: 2023-02-28

## 2023-02-28 NOTE — TELEPHONE ENCOUNTER
Patient needs pregabalin (LYRICA) 50 MG capsule sent to [facility].  States she got an email from them saying we did not approve the refill.   Also send famotidine (Pepcid) to [facility].

## 2023-03-07 RX ORDER — MONTELUKAST SODIUM 10 MG/1
TABLET ORAL
Qty: 90 TABLET | Refills: 3 | Status: SHIPPED | OUTPATIENT
Start: 2023-03-07

## 2023-03-22 ENCOUNTER — TELEPHONE (OUTPATIENT)
Dept: PRIMARY CARE CLINIC | Age: 63
End: 2023-03-22

## 2023-03-22 NOTE — TELEPHONE ENCOUNTER
I spoke to the Patients mail order. Pepcid is out of stock at the [facility] with no date that it will be back. Famotidine is not on formulary, [facility] said that because it is available OTC it would not be covered. Please advise.

## 2023-03-22 NOTE — TELEPHONE ENCOUNTER
She should purchase OTC then. Use [facility]--Most have it for less than $10.00 and [facility] appears to be less than $1.00.

## 2023-04-07 DIAGNOSIS — K21.9 GASTROESOPHAGEAL REFLUX DISEASE WITHOUT ESOPHAGITIS: ICD-10-CM

## 2023-04-07 RX ORDER — FAMOTIDINE 20 MG/1
20 TABLET, FILM COATED ORAL 2 TIMES DAILY
Qty: 180 TABLET | Refills: 1 | Status: SHIPPED | OUTPATIENT
Start: 2023-04-07 | End: 2023-10-04

## 2023-04-07 NOTE — TELEPHONE ENCOUNTER
Pt would also like a script for Ambien. Pt states she is going to have a knee replacement and will need it after.

## 2023-04-07 NOTE — TELEPHONE ENCOUNTER
Advise patient [facility] [employer] is out of office at this time. Okay to talk with Reynold Mo about [facility] [place] refill when she is back. Patient may need to see [employer] in person before this can be filled. Please route this to Reynold Mo as patient calls.

## 2023-04-11 DIAGNOSIS — R94.31 ABNORMAL EKG: Primary | ICD-10-CM

## 2023-04-12 ENCOUNTER — TELEPHONE (OUTPATIENT)
Dept: PRIMARY CARE CLINIC | Age: 63
End: 2023-04-12

## 2023-04-14 DIAGNOSIS — K21.9 GASTROESOPHAGEAL REFLUX DISEASE WITHOUT ESOPHAGITIS: ICD-10-CM

## 2023-04-17 RX ORDER — FAMOTIDINE 20 MG/1
TABLET, FILM COATED ORAL
Qty: 180 TABLET | Refills: 0 | Status: SHIPPED | OUTPATIENT
Start: 2023-04-17

## 2023-05-10 ENCOUNTER — TELEPHONE (OUTPATIENT)
Dept: PRIMARY CARE CLINIC | Age: 63
End: 2023-05-10

## 2023-05-10 RX ORDER — FLUCONAZOLE 150 MG/1
TABLET ORAL
Qty: 2 TABLET | Refills: 0 | Status: SHIPPED | OUTPATIENT
Start: 2023-05-10

## 2023-07-24 ENCOUNTER — TELEPHONE (OUTPATIENT)
Dept: PRIMARY CARE CLINIC | Age: 63
End: 2023-07-24

## 2023-07-24 DIAGNOSIS — R30.0 DYSURIA: Primary | ICD-10-CM

## 2023-07-24 NOTE — TELEPHONE ENCOUNTER
Patient reports UTI sx's - frequency, dysuria - requesting orders for UA and Culture; patient will complete at [facility].       Orders pended - please sign if appropriate. Patient uses [facility]. She has been drink a lot of water and cranberry juice with no relief - asking if there is anything OTC you can recommend.

## 2023-07-26 ENCOUNTER — HOSPITAL ENCOUNTER (OUTPATIENT)
Age: 63
Discharge: HOME | End: 2023-07-26
Payer: COMMERCIAL

## 2023-07-26 DIAGNOSIS — R30.0 DYSURIA: ICD-10-CM

## 2023-07-26 LAB
BILIRUB UR QL STRIP: NEGATIVE
CLARITY UR: CLEAR
COLOR UR: YELLOW
COMMENT: NORMAL
GLUCOSE UR STRIP-MCNC: NEGATIVE MG/DL
HGB UR QL STRIP.AUTO: NEGATIVE
KETONES UR STRIP-MCNC: NEGATIVE MG/DL
LEUKOCYTE ESTERASE UR QL STRIP: NEGATIVE
NITRITE UR QL STRIP: NEGATIVE
PH UR STRIP: 7 [PH] (ref 5–8)
PROT UR STRIP-MCNC: NEGATIVE MG/DL
SP GR UR STRIP: 1.01 (ref 1–1.03)
UROBILINOGEN UR STRIP-ACNC: NORMAL EU/DL (ref 0–1)

## 2023-07-26 PROCEDURE — 81003 URINALYSIS AUTO W/O SCOPE: CPT

## 2023-07-26 PROCEDURE — 87086 URINE CULTURE/COLONY COUNT: CPT

## 2023-07-27 LAB
MICROORGANISM SPEC CULT: NORMAL
SPECIMEN DESCRIPTION: NORMAL

## 2023-08-07 ENCOUNTER — HOSPITAL ENCOUNTER (OUTPATIENT)
Dept: PREADMISSION TESTING | Age: 63
Discharge: HOME | End: 2023-08-11

## 2023-08-07 VITALS — BODY MASS INDEX: 32.44 KG/M2 | HEIGHT: 64 IN | WEIGHT: 190 LBS

## 2023-08-07 RX ORDER — ASPIRIN 81 MG/1
81 TABLET, CHEWABLE ORAL DAILY
COMMUNITY

## 2023-08-07 NOTE — PROGRESS NOTES
Pre-op Instructions For Out-Patient Endoscopy Surgery    Medication Instructions:  Please stop herbs and any supplements now (includes vitamins and minerals). Please contact your surgeon and prescribing physician for pre-op instructions for any blood thinners. If you have inhalers/aerosol treatments at home, please use them the morning of your surgery and bring the inhalers with you to the hospital.    Please take the following medications the morning of your surgery with a sip of water:    NONE. Surgery Instructions:  After midnight before surgery:  Do not eat or drink anything, including water, mints, gum, and hard candy. You may brush your teeth without swallowing. No smoking, chewing tobacco, or street drugs. Please shower or bathe before surgery. Please do not wear any cologne, lotion, powder, jewelry, piercings, perfume, makeup, nail polish, hair accessories, or hair spray on the day of surgery. Wear loose comfortable clothing. Leave your valuables at home. Bring a storage case for any glasses/contacts. An adult who is responsible for you MUST drive you home and should be with you for the first 24 hours after surgery. The Day of Surgery:  Arrive at [facility] Entr[facility] at the time directed by your surgeon and check in at the desk. If you have a living will or healthcare power of , please bring a copy. You will be taken to the pre-op holding area where you will be prepared for surgery. A physical assessment will be performed by a nurse practitioner or house officer. Your IV will be started and you will meet your anesthesiologist.    When you go to surgery, your family will be directed to the surgical waiting room, where the doctor should speak with them after your surgery. After surgery, you will be taken to the recovery room and or short stay unit for recovery and preparation for discharge.     INSTRUCTIONS READ TO JOANA AND

## 2023-08-28 ENCOUNTER — HOSPITAL ENCOUNTER (OUTPATIENT)
Dept: PREADMISSION TESTING | Age: 63
Discharge: HOME | End: 2023-09-01

## 2023-08-28 VITALS — HEIGHT: 64 IN | WEIGHT: 190 LBS | BODY MASS INDEX: 32.44 KG/M2

## 2023-08-28 NOTE — PROGRESS NOTES
Pre-op Instructions For Out-Patient Endoscopy Surgery    Medication Instructions:  Please stop herbs and any supplements now (includes vitamins and minerals). Please contact your surgeon and prescribing physician for pre-op instructions for any blood thinners. ASPIRIN    If you have inhalers/aerosol treatments at home, please use them the morning of your surgery and bring the inhalers with you to the hospital.    Please take the following medications the morning of your surgery with a sip of water:    none    Surgery Instructions:  After midnight before surgery:  Do not eat or drink anything, including water, mints, gum, and hard candy. You may brush your teeth without swallowing. No smoking, chewing tobacco, or street drugs. Please shower or bathe before surgery. Please do not wear any cologne, lotion, powder, jewelry, piercings, perfume, makeup, nail polish, hair accessories, or hair spray on the day of surgery. Wear loose comfortable clothing. Leave your valuables at home but bring a payment source for any after-surgery prescriptions you plan to fill at [facility]. Bring a storage case for any glasses/contacts. An adult who is responsible for you MUST drive you home and should be with you for the first 24 hours after surgery. The Day of Surgery:  Arrive at [facility] AT [facility] Surgery Entrance at the time directed by your surgeon and check in at the desk. If you have a living will or healthcare power of , please bring a copy. You will be taken to the pre-op holding area where you will be prepared for surgery. A physical assessment will be performed by a nurse practitioner or house officer. Your IV will be started and you will meet your anesthesiologist.    When you go to surgery, your family will be directed to the surgical waiting room, where the doctor should speak with them after your surgery. After surgery, you will be taken to the recovery area.

## 2023-08-30 NOTE — PRE-PROCEDURE INSTRUCTIONS
Have you received your Prep? YES Any questions with prep instructions? N/A  Only Clear Liquid Diet day before. YES  Nothing to eat after midnight day before procedure. YES  Are you taking any blood thinners? NO If so, you need to Stop. Remove any jewelry and body piercings. YES  Are you having any Covid symptoms? NO  Do you have any new rashes, infections, etc. that we should be aware of? NO  Do you have a ride home the day of surgery? YES- It cannot be a cab or medical transportation.   Verify surgery time/date and what time to arrive at hospital. 0700

## 2023-08-31 ENCOUNTER — ANESTHESIA EVENT (OUTPATIENT)
Dept: ENDOSCOPY | Age: 63
End: 2023-08-31
Payer: COMMERCIAL

## 2023-09-01 ENCOUNTER — ANESTHESIA (OUTPATIENT)
Dept: ENDOSCOPY | Age: 63
End: 2023-09-01
Payer: COMMERCIAL

## 2023-09-01 ENCOUNTER — HOSPITAL ENCOUNTER (OUTPATIENT)
Age: 63
Setting detail: OUTPATIENT SURGERY
Discharge: HOME | End: 2023-09-01
Attending: SURGERY | Admitting: SURGERY
Payer: COMMERCIAL

## 2023-09-01 VITALS
BODY MASS INDEX: 32.44 KG/M2 | HEART RATE: 71 BPM | OXYGEN SATURATION: 98 % | SYSTOLIC BLOOD PRESSURE: 125 MMHG | WEIGHT: 190 LBS | RESPIRATION RATE: 16 BRPM | TEMPERATURE: 96.8 F | HEIGHT: 64 IN | DIASTOLIC BLOOD PRESSURE: 73 MMHG

## 2023-09-01 DIAGNOSIS — Z12.11 COLON CANCER SCREENING: ICD-10-CM

## 2023-09-01 PROCEDURE — 3609010600 HC COLONOSCOPY POLYPECTOMY SNARE/COLD BIOPSY: Performed by: SURGERY

## 2023-09-01 PROCEDURE — 7100000030 HC ASPR PHASE II RECOVERY - FIRST 15 MIN: Performed by: SURGERY

## 2023-09-01 PROCEDURE — 7100000010 HC PHASE II RECOVERY - FIRST 15 MIN: Performed by: SURGERY

## 2023-09-01 PROCEDURE — 2580000003 HC RX 258: Performed by: ANESTHESIOLOGY

## 2023-09-01 PROCEDURE — 2500000003 HC RX 250 WO HCPCS: Performed by: NURSE ANESTHETIST, CERTIFIED REGISTERED

## 2023-09-01 PROCEDURE — 2709999900 HC NON-CHARGEABLE SUPPLY: Performed by: SURGERY

## 2023-09-01 PROCEDURE — 7100000001 HC PACU RECOVERY - ADDTL 15 MIN: Performed by: SURGERY

## 2023-09-01 PROCEDURE — 7100000031 HC ASPR PHASE II RECOVERY - ADDTL 15 MIN: Performed by: SURGERY

## 2023-09-01 PROCEDURE — 3700000000 HC ANESTHESIA ATTENDED CARE: Performed by: SURGERY

## 2023-09-01 PROCEDURE — 88305 TISSUE EXAM BY PATHOLOGIST: CPT

## 2023-09-01 PROCEDURE — 7100000011 HC PHASE II RECOVERY - ADDTL 15 MIN: Performed by: SURGERY

## 2023-09-01 PROCEDURE — 7100000000 HC PACU RECOVERY - FIRST 15 MIN: Performed by: SURGERY

## 2023-09-01 PROCEDURE — 3700000001 HC ADD 15 MINUTES (ANESTHESIA): Performed by: SURGERY

## 2023-09-01 PROCEDURE — 6360000002 HC RX W HCPCS: Performed by: NURSE ANESTHETIST, CERTIFIED REGISTERED

## 2023-09-01 RX ORDER — DEXAMETHASONE SODIUM PHOSPHATE 4 MG/ML
INJECTION, SOLUTION INTRA-ARTICULAR; INTRALESIONAL; INTRAMUSCULAR; INTRAVENOUS; SOFT TISSUE PRN
Status: DISCONTINUED | OUTPATIENT
Start: 2023-09-01 | End: 2023-09-01 | Stop reason: SDUPTHER

## 2023-09-01 RX ORDER — FENTANYL CITRATE 0.05 MG/ML
25 INJECTION, SOLUTION INTRAMUSCULAR; INTRAVENOUS EVERY 5 MIN PRN
Status: DISCONTINUED | OUTPATIENT
Start: 2023-09-01 | End: 2023-09-01 | Stop reason: HOSPADM

## 2023-09-01 RX ORDER — LIDOCAINE HYDROCHLORIDE 10 MG/ML
1 INJECTION, SOLUTION EPIDURAL; INFILTRATION; INTRACAUDAL; PERINEURAL
Status: DISCONTINUED | OUTPATIENT
Start: 2023-09-01 | End: 2023-09-01 | Stop reason: HOSPADM

## 2023-09-01 RX ORDER — LIDOCAINE HYDROCHLORIDE 10 MG/ML
INJECTION, SOLUTION EPIDURAL; INFILTRATION; INTRACAUDAL; PERINEURAL PRN
Status: DISCONTINUED | OUTPATIENT
Start: 2023-09-01 | End: 2023-09-01 | Stop reason: SDUPTHER

## 2023-09-01 RX ORDER — ONDANSETRON 2 MG/ML
4 INJECTION INTRAMUSCULAR; INTRAVENOUS
Status: DISCONTINUED | OUTPATIENT
Start: 2023-09-01 | End: 2023-09-01 | Stop reason: HOSPADM

## 2023-09-01 RX ORDER — SODIUM CHLORIDE 9 MG/ML
INJECTION, SOLUTION INTRAVENOUS PRN
Status: DISCONTINUED | OUTPATIENT
Start: 2023-09-01 | End: 2023-09-01 | Stop reason: HOSPADM

## 2023-09-01 RX ORDER — FENTANYL CITRATE 0.05 MG/ML
50 INJECTION, SOLUTION INTRAMUSCULAR; INTRAVENOUS EVERY 5 MIN PRN
Status: DISCONTINUED | OUTPATIENT
Start: 2023-09-01 | End: 2023-09-01 | Stop reason: HOSPADM

## 2023-09-01 RX ORDER — SODIUM CHLORIDE, SODIUM LACTATE, POTASSIUM CHLORIDE, CALCIUM CHLORIDE 600; 310; 30; 20 MG/100ML; MG/100ML; MG/100ML; MG/100ML
INJECTION, SOLUTION INTRAVENOUS CONTINUOUS
Status: DISCONTINUED | OUTPATIENT
Start: 2023-09-01 | End: 2023-09-01 | Stop reason: HOSPADM

## 2023-09-01 RX ORDER — SODIUM CHLORIDE 0.9 % (FLUSH) 0.9 %
5-40 SYRINGE (ML) INJECTION PRN
Status: DISCONTINUED | OUTPATIENT
Start: 2023-09-01 | End: 2023-09-01 | Stop reason: HOSPADM

## 2023-09-01 RX ORDER — DIPHENHYDRAMINE HYDROCHLORIDE 50 MG/ML
12.5 INJECTION INTRAMUSCULAR; INTRAVENOUS
Status: DISCONTINUED | OUTPATIENT
Start: 2023-09-01 | End: 2023-09-01 | Stop reason: HOSPADM

## 2023-09-01 RX ORDER — PROPOFOL 10 MG/ML
INJECTION, EMULSION INTRAVENOUS PRN
Status: DISCONTINUED | OUTPATIENT
Start: 2023-09-01 | End: 2023-09-01 | Stop reason: SDUPTHER

## 2023-09-01 RX ORDER — SODIUM CHLORIDE 0.9 % (FLUSH) 0.9 %
5-40 SYRINGE (ML) INJECTION EVERY 12 HOURS SCHEDULED
Status: DISCONTINUED | OUTPATIENT
Start: 2023-09-01 | End: 2023-09-01 | Stop reason: HOSPADM

## 2023-09-01 RX ORDER — ONDANSETRON 2 MG/ML
INJECTION INTRAMUSCULAR; INTRAVENOUS PRN
Status: DISCONTINUED | OUTPATIENT
Start: 2023-09-01 | End: 2023-09-01 | Stop reason: SDUPTHER

## 2023-09-01 RX ADMIN — DEXAMETHASONE SODIUM PHOSPHATE 4 MG: 4 INJECTION INTRA-ARTICULAR; INTRALESIONAL; INTRAMUSCULAR; INTRAVENOUS; SOFT TISSUE at 09:02

## 2023-09-01 RX ADMIN — PROPOFOL 200 MG: 10 INJECTION, EMULSION INTRAVENOUS at 08:52

## 2023-09-01 RX ADMIN — LIDOCAINE HYDROCHLORIDE 50 MG: 10 INJECTION, SOLUTION EPIDURAL; INFILTRATION; INTRACAUDAL; PERINEURAL at 08:52

## 2023-09-01 RX ADMIN — PROPOFOL 100 MG: 10 INJECTION, EMULSION INTRAVENOUS at 09:09

## 2023-09-01 RX ADMIN — ONDANSETRON 4 MG: 2 INJECTION INTRAMUSCULAR; INTRAVENOUS at 09:02

## 2023-09-01 RX ADMIN — SODIUM CHLORIDE, POTASSIUM CHLORIDE, SODIUM LACTATE AND CALCIUM CHLORIDE: 600; 310; 30; 20 INJECTION, SOLUTION INTRAVENOUS at 07:51

## 2023-09-01 ASSESSMENT — PAIN SCALES - GENERAL: PAINLEVEL_OUTOF10: 0

## 2023-09-01 ASSESSMENT — ENCOUNTER SYMPTOMS
COUGH: 0
SHORTNESS OF BREATH: 1
SORE THROAT: 0
SHORTNESS OF BREATH: 0
BACK PAIN: 0

## 2023-09-01 ASSESSMENT — PAIN - FUNCTIONAL ASSESSMENT: PAIN_FUNCTIONAL_ASSESSMENT: NONE - DENIES PAIN

## 2023-09-01 NOTE — OP NOTE
Patient: Janny Zurita  YOB: 1960  MRN: [id number]    Date of Procedure: 9/1/2023  PROCEDURE NOTE    DATE OF PROCEDURE: 9/1/2023    SURGEON: Benson Lozano MD    ASSISTANT: None    PREOPERATIVE DIAGNOSIS: Screening colonoscopy    POSTOPERATIVE DIAGNOSIS: Rectal polyp    OPERATION: Total colonoscopy to cecum. Rectal polypectomy with cold biopsy forceps    ANESTHESIA: General    ESTIMATED BLOOD LOSS: None    COMPLICATIONS: None     SPECIMENS:  Was Obtained:     HISTORY: The patient is a 58y.o. year old female with history of above preop diagnosis. I recommended colonoscopy with possible biopsy or polypectomy and I explained the risk, benefits, expected outcome, and alternatives to the procedure. Risks included but are not limited to bleeding, infection, respiratory distress, hypotension, and perforation of the colon and possibility of missing a lesion. The patient understands and is in agreement. PROCEDURE: The patient was given IV conscious sedation. The patient's SPO2 remained above 90% throughout the procedure. Digital rectal exam was normal.  The colonoscope was inserted through the anus into the rectum and advanced under direct vision to the cecum without difficulty. Terminal ileum was examined for approximately 2 inches. The prep was good. Findings:    Cecum/Ascending colon: normal    Transverse colon: normal    Descending/Sigmoid colon: normal    Rectum/Anus: examined in normal and retroflexed positions and was abnormal: Rectal polyp removed with cold biopsy forceps    Withdrawal Time was (minutes): 20      Next screening colonoscopy: 5 years. If screening is less than 10 years the recommended reason is due:polyps    The colon was decompressed. While withdrawing the scope the above findings were verified and the scope was removed. The patient tolerated the procedure and conscious sedation without unusual events.     In the recovery room patient was examined and remains

## 2023-09-01 NOTE — H&P
HISTORY and [id number]       NAME:  Dannie George  MRN: [id number]   YOB: 1960   Date: 9/1/2023   Age: 58 y.o. Gender: female       COMPLAINT AND PRESENT HISTORY:     Dannie George is 58 y.o.  female, here for [address] Pt has had previous colonoscopy. Pt has had more frequent soft stools in the last few months. Denies diarrhea. Pt has history of GERD. Takes pepcid with moderate relief. Pt takes Pepto for breakthrough symptoms. Denies abdominal pain, dysphagia. Denies nausea, vomiting. Denies blood in stool, dark tarry stools. Denies changes in appetite and unintended weight loss. Denies family history of colon cancer. Completed and followed prescribed prep. NPO p MN. Took NO medications this am with sip of water. Stopped aspirin over one week ago. Denies taking any other blood thinning medications. Denies recent or current chest pain/pressure, palpitations, SOB, recent URI, fever or chills.        RECENT LABS, IMAGING AND TESTING     Lab Results   Component Value Date    WBC 10.7 06/22/2021    RBC 4.96 06/22/2021    HGB 13.8 06/22/2021    HCT 43.9 06/22/2021    MCV 88.5 06/22/2021    MCH 27.8 06/22/2021    MCHC 31.4 06/22/2021    RDW 15.6 (H) 06/22/2021     06/22/2021    MPV 9.9 06/22/2021        Lab Results   Component Value Date     (H) 03/04/2022    K 4.7 03/04/2022     03/04/2022    CO2 24 03/04/2022    BUN 17 03/04/2022    CREATININE 0.61 07/01/2022    GLUCOSE 186 (H) 06/08/2021    CALCIUM 9.8 03/04/2022    PROT 7.0 03/04/2022    LABALBU 4.9 03/04/2022    BILITOT 0.47 03/04/2022    ALKPHOS 75 03/04/2022    AST 30 03/04/2022    ALT 39 (H) 03/04/2022         PAST MEDICAL HISTORY     Past Medical History:   Diagnosis Date    Arthritis     Decreased vision of right eye     Dental bridge present     permanent left upper    Deviated septum     Dry skin     GERD (gastroesophageal reflux disease)     History of chest pain

## 2023-09-04 DIAGNOSIS — M79.2 NERVE PAIN: ICD-10-CM

## 2023-09-05 RX ORDER — PREGABALIN 50 MG/1
CAPSULE ORAL
Qty: 180 CAPSULE | Refills: 0 | Status: SHIPPED | OUTPATIENT
Start: 2023-09-05 | End: 2023-12-04

## 2023-09-06 LAB — SURGICAL PATHOLOGY REPORT: NORMAL

## 2023-09-07 ENCOUNTER — OFFICE VISIT (OUTPATIENT)
Dept: PRIMARY CARE CLINIC | Age: 63
End: 2023-09-07
Payer: COMMERCIAL

## 2023-09-07 VITALS
BODY MASS INDEX: 32.81 KG/M2 | DIASTOLIC BLOOD PRESSURE: 72 MMHG | WEIGHT: 192.2 LBS | SYSTOLIC BLOOD PRESSURE: 120 MMHG | HEART RATE: 72 BPM | OXYGEN SATURATION: 98 % | HEIGHT: 64 IN

## 2023-09-07 DIAGNOSIS — R13.13 PHARYNGEAL DYSPHAGIA: Primary | ICD-10-CM

## 2023-09-07 DIAGNOSIS — G47.9 TROUBLE IN SLEEPING: ICD-10-CM

## 2023-09-07 DIAGNOSIS — R13.13 PHARYNGEAL DYSPHAGIA: ICD-10-CM

## 2023-09-07 DIAGNOSIS — K11.6 CYST, SUBLINGUAL: ICD-10-CM

## 2023-09-07 DIAGNOSIS — E01.0 THYROMEGALY: ICD-10-CM

## 2023-09-07 DIAGNOSIS — Z23 NEED FOR VACCINATION: ICD-10-CM

## 2023-09-07 LAB
T4 FREE: 1.4 NG/DL (ref 0.9–1.7)
TSH SERPL DL<=0.05 MIU/L-ACNC: 0.33 UIU/ML (ref 0.3–5)

## 2023-09-07 PROCEDURE — 90674 CCIIV4 VAC NO PRSV 0.5 ML IM: CPT | Performed by: PHYSICIAN ASSISTANT

## 2023-09-07 PROCEDURE — 90471 IMMUNIZATION ADMIN: CPT | Performed by: PHYSICIAN ASSISTANT

## 2023-09-07 PROCEDURE — 99214 OFFICE O/P EST MOD 30 MIN: CPT | Performed by: PHYSICIAN ASSISTANT

## 2023-09-07 RX ORDER — IBUPROFEN 200 MG
800 TABLET ORAL
COMMUNITY
Start: 2023-05-01 | End: 2023-09-07

## 2023-09-07 SDOH — ECONOMIC STABILITY: INCOME INSECURITY: HOW HARD IS IT FOR YOU TO PAY FOR THE VERY BASICS LIKE FOOD, HOUSING, MEDICAL CARE, AND HEATING?: NOT HARD AT ALL

## 2023-09-07 SDOH — ECONOMIC STABILITY: FOOD INSECURITY: WITHIN THE PAST 12 MONTHS, YOU WORRIED THAT YOUR FOOD WOULD RUN OUT BEFORE YOU GOT MONEY TO BUY MORE.: NEVER TRUE

## 2023-09-07 SDOH — ECONOMIC STABILITY: FOOD INSECURITY: WITHIN THE PAST 12 MONTHS, THE FOOD YOU BOUGHT JUST DIDN'T LAST AND YOU DIDN'T HAVE MONEY TO GET MORE.: NEVER TRUE

## 2023-09-07 SDOH — ECONOMIC STABILITY: HOUSING INSECURITY
IN THE LAST 12 MONTHS, WAS THERE A TIME WHEN YOU DID NOT HAVE A STEADY PLACE TO SLEEP OR SLEPT IN A SHELTER (INCLUDING NOW)?: NO

## 2023-09-07 ASSESSMENT — ENCOUNTER SYMPTOMS
TROUBLE SWALLOWING: 1
VOICE CHANGE: 1
CHOKING: 0
SHORTNESS OF BREATH: 0
RHINORRHEA: 0
NAUSEA: 0
VOMITING: 0
COUGH: 0
SORE THROAT: 0

## 2023-09-27 ENCOUNTER — HOSPITAL ENCOUNTER (OUTPATIENT)
Dept: CT IMAGING | Age: 63
Discharge: HOME | End: 2023-09-29
Payer: COMMERCIAL

## 2023-09-27 DIAGNOSIS — K11.6 CYST, SUBLINGUAL: ICD-10-CM

## 2023-09-27 DIAGNOSIS — E01.0 THYROMEGALY: ICD-10-CM

## 2023-09-27 DIAGNOSIS — R13.13 PHARYNGEAL DYSPHAGIA: ICD-10-CM

## 2023-09-27 LAB
CREAT SERPL-MCNC: 0.7 MG/DL (ref 0.5–0.9)
GFR SERPL CREATININE-BSD FRML MDRD: >60 ML/MIN/1.73M2

## 2023-09-27 PROCEDURE — 70491 CT SOFT TISSUE NECK W/DYE: CPT

## 2023-09-27 PROCEDURE — 2580000003 HC RX 258: Performed by: PHYSICIAN ASSISTANT

## 2023-09-27 PROCEDURE — 36415 COLL VENOUS BLD VENIPUNCTURE: CPT

## 2023-09-27 PROCEDURE — 82565 ASSAY OF CREATININE: CPT

## 2023-09-27 PROCEDURE — 6360000004 HC RX CONTRAST MEDICATION: Performed by: PHYSICIAN ASSISTANT

## 2023-09-27 RX ORDER — SODIUM CHLORIDE 0.9 % (FLUSH) 0.9 %
10 SYRINGE (ML) INJECTION PRN
Status: DISCONTINUED | OUTPATIENT
Start: 2023-09-27 | End: 2023-09-30 | Stop reason: HOSPADM

## 2023-09-27 RX ORDER — 0.9 % SODIUM CHLORIDE 0.9 %
80 INTRAVENOUS SOLUTION INTRAVENOUS ONCE
Status: COMPLETED | OUTPATIENT
Start: 2023-09-27 | End: 2023-09-27

## 2023-09-27 RX ADMIN — IOPAMIDOL 75 ML: 755 INJECTION, SOLUTION INTRAVENOUS at 11:37

## 2023-09-27 RX ADMIN — SODIUM CHLORIDE 80 ML: 9 INJECTION, SOLUTION INTRAVENOUS at 11:37

## 2023-09-27 RX ADMIN — SODIUM CHLORIDE, PRESERVATIVE FREE 10 ML: 5 INJECTION INTRAVENOUS at 11:37

## 2023-10-16 ENCOUNTER — OFFICE VISIT (OUTPATIENT)
Dept: PRIMARY CARE CLINIC | Age: 63
End: 2023-10-16
Payer: COMMERCIAL

## 2023-10-16 VITALS
HEIGHT: 64 IN | SYSTOLIC BLOOD PRESSURE: 118 MMHG | WEIGHT: 199.2 LBS | HEART RATE: 79 BPM | DIASTOLIC BLOOD PRESSURE: 82 MMHG | OXYGEN SATURATION: 95 % | BODY MASS INDEX: 34.01 KG/M2

## 2023-10-16 DIAGNOSIS — G47.9 TROUBLE IN SLEEPING: Primary | ICD-10-CM

## 2023-10-16 DIAGNOSIS — M54.2 NECK PAIN: ICD-10-CM

## 2023-10-16 DIAGNOSIS — Z87.442 HISTORY OF KIDNEY STONES: ICD-10-CM

## 2023-10-16 DIAGNOSIS — R10.9 FLANK PAIN: ICD-10-CM

## 2023-10-16 DIAGNOSIS — R35.0 URINARY FREQUENCY: ICD-10-CM

## 2023-10-16 DIAGNOSIS — M48.02 CERVICAL SPINAL STENOSIS: ICD-10-CM

## 2023-10-16 DIAGNOSIS — M79.2 NERVE PAIN: ICD-10-CM

## 2023-10-16 PROBLEM — L03.115 CELLULITIS OF RIGHT FOOT DUE TO METHICILLIN-RESISTANT STAPHYLOCOCCUS AUREUS: Status: ACTIVE | Noted: 2023-10-16

## 2023-10-16 PROBLEM — R94.31 ABNORMAL EKG: Status: ACTIVE | Noted: 2023-04-14

## 2023-10-16 PROBLEM — B95.62 CELLULITIS OF RIGHT FOOT DUE TO METHICILLIN-RESISTANT STAPHYLOCOCCUS AUREUS: Status: ACTIVE | Noted: 2023-10-16

## 2023-10-16 LAB
BILIRUBIN, POC: NORMAL
BLOOD URINE, POC: NORMAL
CLARITY, POC: NORMAL
COLOR, POC: NORMAL
GLUCOSE URINE, POC: NORMAL
KETONES, POC: NORMAL
LEUKOCYTE EST, POC: NORMAL
NITRITE, POC: NORMAL
PH, POC: 6.5
PROTEIN, POC: NORMAL
SPECIFIC GRAVITY, POC: 1.02
UROBILINOGEN, POC: NORMAL

## 2023-10-16 PROCEDURE — 99214 OFFICE O/P EST MOD 30 MIN: CPT | Performed by: PHYSICIAN ASSISTANT

## 2023-10-16 PROCEDURE — 81003 URINALYSIS AUTO W/O SCOPE: CPT | Performed by: PHYSICIAN ASSISTANT

## 2023-10-16 RX ORDER — TIZANIDINE 4 MG/1
4 TABLET ORAL EVERY 8 HOURS PRN
Qty: 90 TABLET | Refills: 1 | Status: SHIPPED | OUTPATIENT
Start: 2023-10-16

## 2023-10-16 ASSESSMENT — ENCOUNTER SYMPTOMS
BACK PAIN: 1
RESPIRATORY NEGATIVE: 1

## 2023-10-16 NOTE — PROGRESS NOTES
tiZANidine (ZANAFLEX) 4 MG tablet Take 1 tablet by mouth every 8 hours as needed (for tight neck) 90 tablet 1    pregabalin (LYRICA) 50 MG capsule TAKE 1 CAPSULE BY MOUTH TWICE  DAILY FOR 90 DAYS 180 capsule 0    aspirin 81 MG chewable tablet Take 1 tablet by mouth daily      famotidine (PEPCID) 20 MG tablet TAKE 1 TABLET BY MOUTH TWO TIMES A  tablet 0    montelukast (SINGULAIR) 10 MG tablet TAKE 1 TABLET NIGHTLY 90 tablet 3    Cholecalciferol (VITAMIN D3) 1.25 MG (63412 UT) CAPS Take by mouth      magnesium 200 MG TABS tablet Take 1 tablet by mouth daily      Ascorbic Acid (VITAMIN C) 1000 MG tablet Take 1 tablet by mouth 2 times daily       No current facility-administered medications for this visit. Allergies   Allergen Reactions    Food      \"ANCHOVIES MAKE MY MOUTH SWELL\"    Sulfa Antibiotics Itching       Health Maintenance   Topic Date Due    HIV screen  Never done    Shingles vaccine (2 of 3) 02/03/2016    COVID-19 Vaccine (3 - Pfizer series) 01/09/2024 (Originally 6/2/2021)    Breast cancer screen  11/18/2023    Depression Screen  01/09/2024    DTaP/Tdap/Td vaccine (4 - Td or Tdap) 01/13/2024    Diabetes screen  03/04/2025    Lipids  10/03/2025    Cervical cancer screen  09/12/2027    Colorectal Cancer Screen  09/01/2028    Flu vaccine  Completed    Hepatitis C screen  Completed    Hepatitis A vaccine  Aged Out    Hepatitis B vaccine  Aged Out    Hib vaccine  Aged Out    Meningococcal (ACWY) vaccine  Aged Out    Pneumococcal 0-64 years Vaccine  Aged Out       Subjective:      Review of Systems   Constitutional:  Negative for chills, diaphoresis and fever. Respiratory: Negative. Cardiovascular: Negative. Genitourinary:  Positive for frequency. Negative for hematuria. Musculoskeletal:  Positive for back pain and neck pain (feels heavy in neck and shoulder and back of head get itchy). Neurological:  Positive for weakness (in arms) and headaches (everyday).    Psychiatric/Behavioral:

## 2023-10-19 ENCOUNTER — HOSPITAL ENCOUNTER (OUTPATIENT)
Age: 63
Discharge: HOME | End: 2023-10-21
Payer: COMMERCIAL

## 2023-10-19 ENCOUNTER — HOSPITAL ENCOUNTER (OUTPATIENT)
Dept: GENERAL RADIOLOGY | Age: 63
Discharge: HOME | End: 2023-10-21
Payer: COMMERCIAL

## 2023-10-19 DIAGNOSIS — R35.0 URINARY FREQUENCY: ICD-10-CM

## 2023-10-19 DIAGNOSIS — Z87.442 HISTORY OF KIDNEY STONES: ICD-10-CM

## 2023-10-19 DIAGNOSIS — R10.9 FLANK PAIN: ICD-10-CM

## 2023-10-19 PROCEDURE — 74019 RADEX ABDOMEN 2 VIEWS: CPT

## 2024-01-09 ENCOUNTER — OFFICE VISIT (OUTPATIENT)
Dept: NEUROLOGY | Age: 64
End: 2024-01-09
Payer: COMMERCIAL

## 2024-01-09 VITALS
DIASTOLIC BLOOD PRESSURE: 87 MMHG | SYSTOLIC BLOOD PRESSURE: 150 MMHG | BODY MASS INDEX: 34.15 KG/M2 | WEIGHT: 200 LBS | HEIGHT: 64 IN | HEART RATE: 106 BPM

## 2024-01-09 DIAGNOSIS — M54.2 NECK PAIN ON RIGHT SIDE: Primary | ICD-10-CM

## 2024-01-09 DIAGNOSIS — M79.2 NERVE PAIN: ICD-10-CM

## 2024-01-09 PROCEDURE — 99204 OFFICE O/P NEW MOD 45 MIN: CPT | Performed by: STUDENT IN AN ORGANIZED HEALTH CARE EDUCATION/TRAINING PROGRAM

## 2024-01-09 RX ORDER — PREDNISONE 20 MG/1
TABLET ORAL
Qty: 18 TABLET | Refills: 0 | Status: SHIPPED | OUTPATIENT
Start: 2024-01-09

## 2024-01-09 RX ORDER — AMITRIPTYLINE HYDROCHLORIDE 10 MG/1
10 TABLET, FILM COATED ORAL NIGHTLY
Qty: 30 TABLET | Refills: 0 | Status: SHIPPED | OUTPATIENT
Start: 2024-01-09

## 2024-01-09 RX ORDER — PREGABALIN 50 MG/1
50 CAPSULE ORAL 2 TIMES DAILY
Qty: 180 CAPSULE | Refills: 0 | Status: SHIPPED | OUTPATIENT
Start: 2024-01-09 | End: 2024-04-08

## 2024-01-09 NOTE — PROGRESS NOTES
this visit.        ALLERGIES:     Allergies   Allergen Reactions    Food      \"ANCHOVIES MAKE MY MOUTH SWELL\"    Sulfa Antibiotics Itching                          All items selected indicate a positive finding.    Those items not selected are negative.  Constitutional [] Weight loss/gain   [] Fatigue  [] Fever/Chills   HEENT [] Hearing Loss  [] Visual Disturbance  [] Tinnitus  [] Eye pain   Respiratory [] Shortness of Breath  [] Cough  [] Snoring   Cardiovascular [] Chest Pain  [] Palpitations  [] Lightheaded   GI [] Constipation  [] Diarrhea  [] Swallowing change  [] Nausea/vomiting    [] Urinary Frequency  [] Urinary Urgency   Musculoskeletal [] Neck pain  [] Back pain  [] Muscle pain  [] Restless legs   Dermatologic [] Skin changes   Neurologic [] Memory loss/confusion  [] Seizures  [] Trouble walking or imbalance  [] Dizziness  [] Sleep disturbance  [] Weakness  [] Numbness  [] Tremors  [] Speech Difficulty  [] Headaches  [] Light Sensitivity  [] Sound Sensitivity   Endocrinology []Excessive thirst  []Excessive hunger   Psychiatric [] Anxiety/Depression  [] Hallucination   Allergy/immunology []Hives/environmental allergies   Hematologic/lymph [] Abnormal bleeding  [] Abnormal bruising                   PHYSICAL EXAMINATION:       Vitals:    01/09/24 1456   BP: (!) 150/87   Pulse: (!) 106                                              .                                                                                                    General Appearance:  Alert, cooperative, no signs of distress, appears stated age   Head:  Normocephalic, no signs of trauma   Eyes:  Conjunctiva/corneas clear;  eyelids intact   Ears:  Normal external ear and canals   Nose: Nares normal, mucosa normal, no drainage    Throat: Lips and tongue normal; teeth normal;  gums normal   Neck: Supple, intact flexion, extension and rotation;   trachea midline;  no adenopathy;   thyroid: not enlarged;   no carotid pulse abnormality   Back:

## 2024-01-18 ENCOUNTER — OFFICE VISIT (OUTPATIENT)
Dept: PRIMARY CARE CLINIC | Age: 64
End: 2024-01-18
Payer: COMMERCIAL

## 2024-01-18 VITALS
DIASTOLIC BLOOD PRESSURE: 86 MMHG | WEIGHT: 200 LBS | OXYGEN SATURATION: 96 % | BODY MASS INDEX: 34.15 KG/M2 | SYSTOLIC BLOOD PRESSURE: 122 MMHG | HEART RATE: 96 BPM | HEIGHT: 64 IN

## 2024-01-18 DIAGNOSIS — G43.109 MIGRAINE WITH AURA AND WITHOUT STATUS MIGRAINOSUS, NOT INTRACTABLE: ICD-10-CM

## 2024-01-18 DIAGNOSIS — Z23 NEED FOR VACCINATION: ICD-10-CM

## 2024-01-18 DIAGNOSIS — R10.11 RIGHT UPPER QUADRANT PAIN: ICD-10-CM

## 2024-01-18 DIAGNOSIS — Z12.31 ENCOUNTER FOR SCREENING MAMMOGRAM FOR MALIGNANT NEOPLASM OF BREAST: ICD-10-CM

## 2024-01-18 DIAGNOSIS — M43.6 NECK STIFFNESS: Primary | ICD-10-CM

## 2024-01-18 DIAGNOSIS — K21.9 GASTROESOPHAGEAL REFLUX DISEASE, UNSPECIFIED WHETHER ESOPHAGITIS PRESENT: ICD-10-CM

## 2024-01-18 PROCEDURE — 90471 IMMUNIZATION ADMIN: CPT | Performed by: PHYSICIAN ASSISTANT

## 2024-01-18 PROCEDURE — 99214 OFFICE O/P EST MOD 30 MIN: CPT | Performed by: PHYSICIAN ASSISTANT

## 2024-01-18 PROCEDURE — 90715 TDAP VACCINE 7 YRS/> IM: CPT | Performed by: PHYSICIAN ASSISTANT

## 2024-01-18 PROCEDURE — 90750 HZV VACC RECOMBINANT IM: CPT | Performed by: PHYSICIAN ASSISTANT

## 2024-01-18 PROCEDURE — 90472 IMMUNIZATION ADMIN EACH ADD: CPT | Performed by: PHYSICIAN ASSISTANT

## 2024-01-18 RX ORDER — OMEPRAZOLE 20 MG/1
20 CAPSULE, DELAYED RELEASE ORAL
Qty: 30 CAPSULE | Refills: 1 | Status: SHIPPED | OUTPATIENT
Start: 2024-01-18

## 2024-01-18 RX ORDER — IBUPROFEN 200 MG
800 TABLET ORAL
COMMUNITY
Start: 2023-05-01 | End: 2024-01-18 | Stop reason: ALTCHOICE

## 2024-01-18 RX ORDER — SODIUM FLUORIDE 6.1 MG/ML
GEL, DENTIFRICE DENTAL
COMMUNITY
Start: 2023-10-18

## 2024-01-18 ASSESSMENT — PATIENT HEALTH QUESTIONNAIRE - PHQ9
SUM OF ALL RESPONSES TO PHQ QUESTIONS 1-9: 0
SUM OF ALL RESPONSES TO PHQ QUESTIONS 1-9: 0
SUM OF ALL RESPONSES TO PHQ9 QUESTIONS 1 & 2: 0
1. LITTLE INTEREST OR PLEASURE IN DOING THINGS: 0
SUM OF ALL RESPONSES TO PHQ QUESTIONS 1-9: 0
2. FEELING DOWN, DEPRESSED OR HOPELESS: 0
SUM OF ALL RESPONSES TO PHQ QUESTIONS 1-9: 0

## 2024-01-18 ASSESSMENT — ENCOUNTER SYMPTOMS
BLOOD IN STOOL: 0
CONSTIPATION: 0
VOMITING: 0
VOICE CHANGE: 0
SORE THROAT: 0
NAUSEA: 0
TROUBLE SWALLOWING: 0
ABDOMINAL PAIN: 1

## 2024-01-18 NOTE — PROGRESS NOTES
[facility]  [facility] PRIMARY CARE  [address]  [place] [address]  Dept: [phone]    Jayshree Espinal is a 63 y.o. female who presents today for her medical conditions/complaints as noted below.      Chief Complaint   Patient presents with    Follow-up     Pt is here for a x3 week f/u for sleep issues -- pt states sleep is getting better , headache is better but she is still having neck stiffness and arm tingling     Other     Pt is still waiting for an MRI apt        HPI:     HPI  Dr. Ambrocio ordered the MRI of cervical spine--awaiting precert  Is using her CPAP  Also on steroid taper and Elavil and HAs are much improved  Reviewed Dr. Mejía note 9/2023    Reflux is \"really bad\" and her dentist  noticed erosion on teeth  Pepcid is not helping any longer  She has started to use a wedge pillow  NO hemoptysis, hematemesis, melena, Wloss      LDL Cholesterol (mg/dL)   Date Value   10/03/2020 104     LDL Calculated (mg/dL)   Date Value   12/07/2017 138       (goal LDL is <100)   AST (U/L)   Date Value   03/04/2022 30     ALT (U/L)   Date Value   03/04/2022 39 (H)     BUN (mg/dL)   Date Value   03/04/2022 17     BP Readings from Last 3 Encounters:   01/18/24 122/86   01/09/24 (!) 150/87   10/16/23 118/82          (goal 120/80)    Past Medical History:   Diagnosis Date    Arthritis     Decreased vision of right eye     Dental bridge present     permanent left upper    Deviated septum     Dry skin     GERD (gastroesophageal reflux disease)     History of chest pain     past,stress test negative,poss acid reflux    Kidney stones     Sinus infection 01/04/2018    on antibiotics x 10 days,better no cough or fever    Sleep apnea     mild no machine NO LONGER SNORES AND USES CPAP MACHINE NIGHTLY    Snores     Tingling     Urgency of urination     occas      Past Surgical History:   Procedure Laterality Date    CATARACT REMOVAL Bilateral     COLONOSCOPY      COLONOSCOPY N/A 9/1/2023

## 2024-01-26 ENCOUNTER — HOSPITAL ENCOUNTER (OUTPATIENT)
Dept: MAMMOGRAPHY | Age: 64
End: 2024-01-26
Payer: COMMERCIAL

## 2024-01-26 ENCOUNTER — HOSPITAL ENCOUNTER (OUTPATIENT)
Dept: ULTRASOUND IMAGING | Age: 64
End: 2024-01-26
Payer: COMMERCIAL

## 2024-01-26 ENCOUNTER — HOSPITAL ENCOUNTER (OUTPATIENT)
Dept: MRI IMAGING | Age: 64
Discharge: HOME | End: 2024-01-26
Attending: STUDENT IN AN ORGANIZED HEALTH CARE EDUCATION/TRAINING PROGRAM
Payer: COMMERCIAL

## 2024-01-26 VITALS — HEIGHT: 64 IN | WEIGHT: 200 LBS | BODY MASS INDEX: 34.15 KG/M2

## 2024-01-26 DIAGNOSIS — M54.2 NECK PAIN ON RIGHT SIDE: ICD-10-CM

## 2024-01-26 DIAGNOSIS — R10.11 RIGHT UPPER QUADRANT PAIN: ICD-10-CM

## 2024-01-26 DIAGNOSIS — Z12.31 ENCOUNTER FOR SCREENING MAMMOGRAM FOR MALIGNANT NEOPLASM OF BREAST: ICD-10-CM

## 2024-01-26 DIAGNOSIS — K21.9 GASTROESOPHAGEAL REFLUX DISEASE, UNSPECIFIED WHETHER ESOPHAGITIS PRESENT: ICD-10-CM

## 2024-01-26 PROCEDURE — 76705 ECHO EXAM OF ABDOMEN: CPT

## 2024-01-26 PROCEDURE — 72141 MRI NECK SPINE W/O DYE: CPT

## 2024-01-26 PROCEDURE — 77063 BREAST TOMOSYNTHESIS BI: CPT

## 2024-01-30 DIAGNOSIS — K76.0 FATTY INFILTRATION OF LIVER: Primary | ICD-10-CM

## 2024-02-05 RX ORDER — AMITRIPTYLINE HYDROCHLORIDE 10 MG/1
10 TABLET, FILM COATED ORAL NIGHTLY
Qty: 30 TABLET | Refills: 5 | Status: SHIPPED | OUTPATIENT
Start: 2024-02-05

## 2024-02-05 NOTE — TELEPHONE ENCOUNTER
Pharmacy requesting refill of amitriptyline (ELAVIL) 10 MG tablet      Medication active on med list yes      Date of last Rx: 1/9/2024 with 0 refills          verified by LILY HAIR      Date of last appointment 1/9/2024    Next Visit Date:  4/18/2024

## 2024-02-08 ENCOUNTER — TELEPHONE (OUTPATIENT)
Dept: NEUROLOGY | Age: 64
End: 2024-02-08

## 2024-02-13 RX ORDER — MONTELUKAST SODIUM 10 MG/1
10 TABLET ORAL NIGHTLY
Qty: 90 TABLET | Refills: 0 | Status: SHIPPED | OUTPATIENT
Start: 2024-02-13

## 2024-03-01 ENCOUNTER — TELEPHONE (OUTPATIENT)
Dept: NEUROLOGY | Age: 64
End: 2024-03-01

## 2024-03-01 NOTE — TELEPHONE ENCOUNTER
----- Message from Carmella Ambrocio MD sent at 2/27/2024  2:04 PM EST -----  Please let patient know that mri c spine showed degenerative changes. She does have more narrowing on the right side at C5-C6 and C6-C7 which can cause neck pain. Would recommend Pt if she is amenable .

## 2024-03-01 NOTE — TELEPHONE ENCOUNTER
Patient notified. She stated that she has been going to physical therapy for a few weeks since the order was originally placed. She stated that she is frustrated because she got the MRI done and her insurance has denied paying for it stating she has not had enough weeks of physical therapy. Patient will continue physical therapy at this time and will follow up on 4/18/24.

## 2024-03-21 PROBLEM — L03.115 CELLULITIS OF RIGHT FOOT DUE TO METHICILLIN-RESISTANT STAPHYLOCOCCUS AUREUS: Status: RESOLVED | Noted: 2023-10-16 | Resolved: 2024-03-21

## 2024-03-21 PROBLEM — R06.89 GASPING FOR BREATH: Status: RESOLVED | Noted: 2022-07-15 | Resolved: 2024-03-21

## 2024-03-21 PROBLEM — B95.62 CELLULITIS OF RIGHT FOOT DUE TO METHICILLIN-RESISTANT STAPHYLOCOCCUS AUREUS: Status: RESOLVED | Noted: 2023-10-16 | Resolved: 2024-03-21

## 2024-03-21 ASSESSMENT — ENCOUNTER SYMPTOMS
WHEEZING: 0
EYE REDNESS: 0
SORE THROAT: 0
EYE DISCHARGE: 0
SHORTNESS OF BREATH: 0
DIARRHEA: 0
VOMITING: 0
COUGH: 0
NAUSEA: 0
RHINORRHEA: 0
ABDOMINAL PAIN: 0

## 2024-03-21 NOTE — PROGRESS NOTES
[facility]  [facility] PRIMARY CARE  [address]  [place] [address]  Dept: [phone]    Jayshree Espinal is a 64 y.o. female who presents today for her medical conditions/complaints as noted below.      Chief Complaint   Patient presents with    Gastroesophageal Reflux     Pt is here for a 2 Month f/u.        HPI:     HPI  GERD:  Prilosec and Pepcid and still gets some break thru sxs  Abdominal US: normal GB, fatty liver.     Having few HAs. Exercise from PT do help sometimes. Used the amitriptyline and it helped.  She did try a 10mg edible and it helps her sleep thru knee and neck pain.   Sees Dr. Ambrocio   April 18           Sees Dr. Moira Cervantes  in May    LDL Cholesterol (mg/dL)   Date Value   10/03/2020 104     LDL Calculated (mg/dL)   Date Value   12/07/2017 138       (goal LDL is <100)   AST (U/L)   Date Value   03/04/2022 30     ALT (U/L)   Date Value   03/04/2022 39 (H)     BUN (mg/dL)   Date Value   03/04/2022 17     BP Readings from Last 3 Encounters:   03/22/24 120/70   01/18/24 122/86   01/09/24 (!) 150/87          (goal 120/80)    Past Medical History:   Diagnosis Date    Arthritis     Decreased vision of right eye     Dental bridge present     permanent left upper    Deviated septum     Dry skin     GERD (gastroesophageal reflux disease)     History of chest pain     past,stress test negative,poss acid reflux    Kidney stones     Sinus infection 01/04/2018    on antibiotics x 10 days,better no cough or fever    Sleep apnea     mild no machine NO LONGER SNORES AND USES CPAP MACHINE NIGHTLY    Snores     Tingling     Urgency of urination     occas      Past Surgical History:   Procedure Laterality Date    CATARACT REMOVAL Bilateral     COLONOSCOPY      COLONOSCOPY N/A 9/1/2023    COLONOSCOPY POLYPECTOMY COLD BIOPSY performed by Puneet Rose MD at [facility]    EYE SURGERY      EYE SURGERY Bilateral     YAG- right x3, left x2    JOINT REPLACEMENT Right 05/2023

## 2024-03-22 ENCOUNTER — OFFICE VISIT (OUTPATIENT)
Dept: PRIMARY CARE CLINIC | Age: 64
End: 2024-03-22
Payer: MEDICARE

## 2024-03-22 VITALS
WEIGHT: 205.2 LBS | HEART RATE: 89 BPM | OXYGEN SATURATION: 97 % | SYSTOLIC BLOOD PRESSURE: 120 MMHG | BODY MASS INDEX: 35.03 KG/M2 | HEIGHT: 64 IN | DIASTOLIC BLOOD PRESSURE: 70 MMHG

## 2024-03-22 DIAGNOSIS — Z23 NEED FOR VACCINATION: ICD-10-CM

## 2024-03-22 DIAGNOSIS — K21.9 GASTROESOPHAGEAL REFLUX DISEASE, UNSPECIFIED WHETHER ESOPHAGITIS PRESENT: Primary | ICD-10-CM

## 2024-03-22 DIAGNOSIS — K76.0 FATTY INFILTRATION OF LIVER: ICD-10-CM

## 2024-03-22 DIAGNOSIS — K76.0 FATTY LIVER: ICD-10-CM

## 2024-03-22 LAB
ALBUMIN SERPL-MCNC: 4.4 G/DL (ref 3.5–5.2)
ALBUMIN/GLOBULIN RATIO: 2 (ref 1–2.5)
ALP BLD-CCNC: 70 U/L (ref 35–104)
ALT SERPL-CCNC: 46 U/L (ref 10–35)
AST SERPL-CCNC: 32 U/L (ref 10–35)
BILIRUB SERPL-MCNC: 0.4 MG/DL (ref 0–1.2)
BILIRUBIN DIRECT: <0.2 MG/DL (ref 0–0.3)
BILIRUBIN, INDIRECT: 0.3 MG/DL (ref 0–1)
GLOBULIN: 2.8 G/DL
TOTAL PROTEIN: 7.2 G/DL (ref 6.6–8.7)

## 2024-03-22 PROCEDURE — G8399 PT W/DXA RESULTS DOCUMENT: HCPCS | Performed by: PHYSICIAN ASSISTANT

## 2024-03-22 PROCEDURE — G8427 DOCREV CUR MEDS BY ELIG CLIN: HCPCS | Performed by: PHYSICIAN ASSISTANT

## 2024-03-22 PROCEDURE — G8417 CALC BMI ABV UP PARAM F/U: HCPCS | Performed by: PHYSICIAN ASSISTANT

## 2024-03-22 PROCEDURE — G8482 FLU IMMUNIZE ORDER/ADMIN: HCPCS | Performed by: PHYSICIAN ASSISTANT

## 2024-03-22 PROCEDURE — 99213 OFFICE O/P EST LOW 20 MIN: CPT | Performed by: PHYSICIAN ASSISTANT

## 2024-03-22 PROCEDURE — 1036F TOBACCO NON-USER: CPT | Performed by: PHYSICIAN ASSISTANT

## 2024-03-22 PROCEDURE — 3017F COLORECTAL CA SCREEN DOC REV: CPT | Performed by: PHYSICIAN ASSISTANT

## 2024-03-22 PROCEDURE — 1090F PRES/ABSN URINE INCON ASSESS: CPT | Performed by: PHYSICIAN ASSISTANT

## 2024-03-22 PROCEDURE — 90750 HZV VACC RECOMBINANT IM: CPT | Performed by: PHYSICIAN ASSISTANT

## 2024-03-22 PROCEDURE — 1123F ACP DISCUSS/DSCN MKR DOCD: CPT | Performed by: PHYSICIAN ASSISTANT

## 2024-03-22 PROCEDURE — 90471 IMMUNIZATION ADMIN: CPT | Performed by: PHYSICIAN ASSISTANT

## 2024-03-22 RX ORDER — OMEPRAZOLE 20 MG/1
20 CAPSULE, DELAYED RELEASE ORAL
Qty: 90 CAPSULE | Refills: 1 | Status: SHIPPED | OUTPATIENT
Start: 2024-03-22

## 2024-04-01 ENCOUNTER — OFFICE VISIT (OUTPATIENT)
Dept: GASTROENTEROLOGY | Age: 64
End: 2024-04-01
Payer: MEDICARE

## 2024-04-01 ENCOUNTER — TELEPHONE (OUTPATIENT)
Dept: GASTROENTEROLOGY | Age: 64
End: 2024-04-01

## 2024-04-01 VITALS
WEIGHT: 197.6 LBS | DIASTOLIC BLOOD PRESSURE: 87 MMHG | HEART RATE: 83 BPM | OXYGEN SATURATION: 95 % | SYSTOLIC BLOOD PRESSURE: 134 MMHG | BODY MASS INDEX: 33.9 KG/M2 | TEMPERATURE: 97.2 F

## 2024-04-01 DIAGNOSIS — K76.0 HEPATIC STEATOSIS: ICD-10-CM

## 2024-04-01 DIAGNOSIS — R79.89 ABNORMAL LFTS: ICD-10-CM

## 2024-04-01 DIAGNOSIS — R74.01 ELEVATION OF LEVELS OF LIVER TRANSAMINASE LEVELS: ICD-10-CM

## 2024-04-01 DIAGNOSIS — K21.9 GASTROESOPHAGEAL REFLUX DISEASE, UNSPECIFIED WHETHER ESOPHAGITIS PRESENT: Primary | ICD-10-CM

## 2024-04-01 PROCEDURE — 1123F ACP DISCUSS/DSCN MKR DOCD: CPT | Performed by: INTERNAL MEDICINE

## 2024-04-01 PROCEDURE — G8417 CALC BMI ABV UP PARAM F/U: HCPCS | Performed by: INTERNAL MEDICINE

## 2024-04-01 PROCEDURE — G8427 DOCREV CUR MEDS BY ELIG CLIN: HCPCS | Performed by: INTERNAL MEDICINE

## 2024-04-01 PROCEDURE — 1036F TOBACCO NON-USER: CPT | Performed by: INTERNAL MEDICINE

## 2024-04-01 PROCEDURE — 99204 OFFICE O/P NEW MOD 45 MIN: CPT | Performed by: INTERNAL MEDICINE

## 2024-04-01 PROCEDURE — 1090F PRES/ABSN URINE INCON ASSESS: CPT | Performed by: INTERNAL MEDICINE

## 2024-04-01 PROCEDURE — G8399 PT W/DXA RESULTS DOCUMENT: HCPCS | Performed by: INTERNAL MEDICINE

## 2024-04-01 PROCEDURE — G2211 COMPLEX E/M VISIT ADD ON: HCPCS | Performed by: INTERNAL MEDICINE

## 2024-04-01 PROCEDURE — 3017F COLORECTAL CA SCREEN DOC REV: CPT | Performed by: INTERNAL MEDICINE

## 2024-04-01 ASSESSMENT — ENCOUNTER SYMPTOMS
NAUSEA: 0
DIARRHEA: 0
BLOOD IN STOOL: 0
CONSTIPATION: 0
ABDOMINAL DISTENTION: 1
SORE THROAT: 0
COUGH: 0
TROUBLE SWALLOWING: 1
WHEEZING: 0
VOMITING: 0
ABDOMINAL PAIN: 1
ANAL BLEEDING: 0
CHOKING: 1

## 2024-04-01 NOTE — TELEPHONE ENCOUNTER
Procedure scheduled/Dr Shannon  Procedure: EGD  Dx: GERD, unspecified whether esophagitis present  Date: Thursday 05/23/24  Time: 9:15 am/Arrive 7:15 am  Hospital: [facility]; Surgery Entrance, back of hospital  PAT Phone Call: Thursday 05/09/24 at 9:30 am  Bowel Prep instructions given: EGD Prep  In office/via phone:  in office  Clearance needed: N/A

## 2024-04-01 NOTE — PROGRESS NOTES
Pulmonary/Chest: Effort normal and breath sounds normal. No stridor. No respiratory distress. He has no wheezes. He has no rales. He exhibits no tenderness.   Abdominal: Soft. Bowel sounds are normal. He exhibits no distension and no mass. There is no tenderness. There is no rebound and no guarding. No hernia.   Musculoskeletal: Normal range of motion.   Lymphadenopathy:    Patient has no cervical adenopathy.   Neurological: Patient is alert and oriented to person, place, and time.   Psychiatric: Patient has a normal mood and affect. Patient behavior is normal.       LABORATORY DATA: Reviewed  Lab Results   Component Value Date    WBC 10.7 06/22/2021    HGB 13.8 06/22/2021    HCT 43.9 06/22/2021    MCV 88.5 06/22/2021     06/22/2021     (H) 03/04/2022    K 4.7 03/04/2022     03/04/2022    CO2 24 03/04/2022    BUN 17 03/04/2022    CREATININE 0.7 09/27/2023    LABALBU 4.4 03/22/2024    BILITOT 0.4 03/22/2024    ALKPHOS 70 03/22/2024    AST 32 03/22/2024    ALT 46 (H) 03/22/2024         Lab Results   Component Value Date    RBC 4.96 06/22/2021    HGB 13.8 06/22/2021    MCV 88.5 06/22/2021    MCH 27.8 06/22/2021    MCHC 31.4 06/22/2021    RDW 15.6 (H) 06/22/2021    MPV 9.9 06/22/2021    BASOPCT 1 06/22/2021    LYMPHSABS 2.89 06/22/2021    MONOSABS 0.53 06/22/2021    NEUTROABS 6.89 06/22/2021    EOSABS 0.33 06/22/2021    BASOSABS 0.05 06/22/2021         IMPRESSION: Ms. Espinal is a 64 y.o. female with a past history remarkable for obesity, GERD, sleep apnea,, referred for evaluation of GERD.     Assessment  1. Gastroesophageal reflux disease, unspecified whether esophagitis present    2. Hepatic steatosis    3. Abnormal LFTs    4. Elevation of levels of liver transaminase levels      Plan:  -Will plan for EGD (obtain biopsies of the stomach, potentially distal esophagus)  -Obtain liver disease workup  -Obtain ultrasound of the liver, elastography and fibrosis panel.  -Explained to her that if this is

## 2024-04-10 ENCOUNTER — HOSPITAL ENCOUNTER (OUTPATIENT)
Dept: ULTRASOUND IMAGING | Age: 64
Discharge: HOME | End: 2024-04-12
Attending: INTERNAL MEDICINE
Payer: COMMERCIAL

## 2024-04-10 DIAGNOSIS — K76.0 HEPATIC STEATOSIS: ICD-10-CM

## 2024-04-10 DIAGNOSIS — R79.89 ABNORMAL LFTS: ICD-10-CM

## 2024-04-10 PROCEDURE — 76981 USE PARENCHYMA: CPT

## 2024-04-15 RX ORDER — MONTELUKAST SODIUM 10 MG/1
10 TABLET ORAL NIGHTLY
Qty: 90 TABLET | Refills: 3 | Status: SHIPPED | OUTPATIENT
Start: 2024-04-15

## 2024-04-17 ENCOUNTER — HOSPITAL ENCOUNTER (OUTPATIENT)
Age: 64
Discharge: HOME | End: 2024-04-17
Payer: COMMERCIAL

## 2024-04-17 DIAGNOSIS — R79.89 ABNORMAL LFTS: ICD-10-CM

## 2024-04-17 DIAGNOSIS — R74.01 ELEVATION OF LEVELS OF LIVER TRANSAMINASE LEVELS: ICD-10-CM

## 2024-04-17 DIAGNOSIS — K76.0 HEPATIC STEATOSIS: ICD-10-CM

## 2024-04-17 LAB
A1AT SERPL-MCNC: 134 MG/DL (ref 90–200)
CERULOPLASMIN SERPL-MCNC: 25 MG/DL (ref 16–45)
FERRITIN SERPL-MCNC: 70 NG/ML (ref 13–150)
HAV IGM SERPL QL IA: NONREACTIVE
HBV CORE IGM SERPL QL IA: NONREACTIVE
HBV SURFACE AG SERPL QL IA: NONREACTIVE
HCV AB SERPL QL IA: NONREACTIVE
IRON SATN MFR SERPL: 23 % (ref 20–55)
IRON SERPL-MCNC: 71 UG/DL (ref 37–145)
TIBC SERPL-MCNC: 308 UG/DL (ref 250–450)
UNSATURATED IRON BINDING CAPACITY: 237 UG/DL (ref 112–347)

## 2024-04-17 PROCEDURE — 82784 ASSAY IGA/IGD/IGG/IGM EACH: CPT

## 2024-04-17 PROCEDURE — 82103 ALPHA-1-ANTITRYPSIN TOTAL: CPT

## 2024-04-17 PROCEDURE — 36415 COLL VENOUS BLD VENIPUNCTURE: CPT

## 2024-04-17 PROCEDURE — 84460 ALANINE AMINO (ALT) (SGPT): CPT

## 2024-04-17 PROCEDURE — 83883 ASSAY NEPHELOMETRY NOT SPEC: CPT

## 2024-04-17 PROCEDURE — 80074 ACUTE HEPATITIS PANEL: CPT

## 2024-04-17 PROCEDURE — 83550 IRON BINDING TEST: CPT

## 2024-04-17 PROCEDURE — 84520 ASSAY OF UREA NITROGEN: CPT

## 2024-04-17 PROCEDURE — 86038 ANTINUCLEAR ANTIBODIES: CPT

## 2024-04-17 PROCEDURE — 82977 ASSAY OF GGT: CPT

## 2024-04-17 PROCEDURE — 83516 IMMUNOASSAY NONANTIBODY: CPT

## 2024-04-17 PROCEDURE — 82390 ASSAY OF CERULOPLASMIN: CPT

## 2024-04-17 PROCEDURE — 84450 TRANSFERASE (AST) (SGOT): CPT

## 2024-04-17 PROCEDURE — 86225 DNA ANTIBODY NATIVE: CPT

## 2024-04-17 PROCEDURE — 83540 ASSAY OF IRON: CPT

## 2024-04-17 PROCEDURE — 82728 ASSAY OF FERRITIN: CPT

## 2024-04-19 LAB
ANA SER QL IA: NEGATIVE
DSDNA IGG SER QL IA: 0.9 IU/ML
GLIADIN IGA SER IA-ACNC: 0.6 U/ML
GLIADIN IGG SER IA-ACNC: <0.4 U/ML
IGA SERPL-MCNC: 233 MG/DL (ref 70–400)
MITOCHONDRIA M2 IGG SER-ACNC: 1.7 U/ML (ref 0–4)
NUCLEAR IGG SER IA-RTO: 0.3 U/ML
SMOOTH MUSCLE ANTIBODY: 4 UNITS (ref 0–19)
TTG IGA SER IA-ACNC: 0.3 U/ML

## 2024-04-20 LAB
ALANINE AMINOTRANSFERASE, FIBROMETER: 45 U/L (ref 5–40)
ALPHA-2-MACROGLOBULIN, FIBROMETER: 189 MG/DL (ref 131–293)
ASPARTATE AMINOTRANSFERASE, FIBROMETER: 29 U/L (ref 9–40)
CIRRHOMETER PATIENT SCORE: 0
EER FIBROMETER REPORT: ABNORMAL
FIBROMETER INTERPRETATION: ABNORMAL
FIBROMETER PATIENT SCORE: 0.22
FIBROMETER PLATELET COUNT: 301
FIBROMETER PROTHROMBIN INDEX: 103 % (ref 90–120)
FIBROSIS METAVIR CLASSIFICATION: ABNORMAL
GAMMA GLUTAMYL TRANSFERASE, FIBROMETER: 27 U/L (ref 7–33)
INFLAMETER METAVIR CLASSIFICATION: ABNORMAL
INFLAMETER PATIENT SCORE: 0.2
UREA NITROGEN, FIBROMETER: 13 MG/DL (ref 7–20)

## 2024-05-02 ENCOUNTER — OFFICE VISIT (OUTPATIENT)
Dept: NEUROLOGY | Age: 64
End: 2024-05-02

## 2024-05-02 VITALS
DIASTOLIC BLOOD PRESSURE: 77 MMHG | HEART RATE: 86 BPM | WEIGHT: 199.4 LBS | BODY MASS INDEX: 34.04 KG/M2 | HEIGHT: 64 IN | SYSTOLIC BLOOD PRESSURE: 121 MMHG

## 2024-05-02 DIAGNOSIS — M54.2 NECK PAIN ON RIGHT SIDE: Primary | ICD-10-CM

## 2024-05-02 DIAGNOSIS — M54.16 LUMBAR RADICULAR PAIN: ICD-10-CM

## 2024-05-02 DIAGNOSIS — M79.2 NERVE PAIN: ICD-10-CM

## 2024-05-02 DIAGNOSIS — G43.109 MIGRAINE WITH AURA AND WITHOUT STATUS MIGRAINOSUS, NOT INTRACTABLE: ICD-10-CM

## 2024-05-02 RX ORDER — AMITRIPTYLINE HYDROCHLORIDE 10 MG/1
10 TABLET, FILM COATED ORAL NIGHTLY
COMMUNITY
Start: 2024-04-10 | End: 2024-05-02 | Stop reason: SDUPTHER

## 2024-05-02 RX ORDER — AMOXICILLIN 500 MG/1
CAPSULE ORAL
COMMUNITY
Start: 2024-04-10

## 2024-05-02 RX ORDER — AMITRIPTYLINE HYDROCHLORIDE 25 MG/1
25 TABLET, FILM COATED ORAL NIGHTLY
Qty: 30 TABLET | Refills: 1 | Status: SHIPPED | OUTPATIENT
Start: 2024-05-02 | End: 2025-05-08

## 2024-05-09 ENCOUNTER — HOSPITAL ENCOUNTER (OUTPATIENT)
Dept: PREADMISSION TESTING | Age: 64
Discharge: HOME | End: 2024-05-13

## 2024-05-09 VITALS — WEIGHT: 195 LBS | BODY MASS INDEX: 33.29 KG/M2 | HEIGHT: 64 IN

## 2024-05-09 RX ORDER — ACETAMINOPHEN 160 MG
1 TABLET,DISINTEGRATING ORAL DAILY
COMMUNITY

## 2024-05-09 RX ORDER — VIT C/E/ZN/COPPR/LUTEIN/ZEAXAN 250MG-90MG
1 CAPSULE ORAL DAILY
COMMUNITY

## 2024-05-09 RX ORDER — VITAMIN B COMPLEX
1 CAPSULE ORAL DAILY
COMMUNITY

## 2024-05-09 RX ORDER — ASCORBATE CALCIUM/BIOFLAVONOID 1000-200MG
1 TABLET, EXTENDED RELEASE ORAL DAILY
COMMUNITY

## 2024-05-09 NOTE — PROGRESS NOTES
Pre-op Instructions For Out-Patient Endoscopy Surgery    Medication Instructions:  Please stop herbs and any supplements now (includes vitamins and minerals).    Please contact your surgeon and prescribing physician for pre-op instructions for any blood thinners.  To avoid for 1 day prior to procedure  If you have inhalers/aerosol treatments at home, please use them the morning of your surgery and bring the inhalers with you to the hospital.    Please take the following medications the morning of your surgery with a sip of water:    none    Surgery Instructions:  After midnight before surgery:  Do not eat or drink anything, including water, mints, gum, and hard candy.  You may brush your teeth without swallowing.  No smoking, chewing tobacco, or street drugs.    Please shower or bathe before surgery.       Please do not wear any cologne, lotion, powder, jewelry, piercings, perfume, makeup, nail polish, hair accessories, or hair spray on the day of surgery.  Wear loose comfortable clothing.    Leave your valuables at home but bring a payment source for any after-surgery prescriptions you plan to fill at [facility].  Bring a storage case for any glasses/contacts.    An adult who is responsible for you MUST drive you home and should be with you for the first 24 hours after surgery.     The Day of Surgery:  Arrive at [facility] Surgery Entrance at the time directed by your surgeon and check in at the desk.     If you have a living will or healthcare power of , please bring a copy.    You will be taken to the pre-op holding area where you will be prepared for surgery.  A physical assessment will be performed by a nurse practitioner or house officer.  Your IV will be started and you will meet your anesthesiologist.    When you go to surgery, your family will be directed to the surgical waiting room, where the doctor should speak with them after your surgery.    After surgery, you will be

## 2024-05-21 NOTE — PRE-PROCEDURE INSTRUCTIONS
No answer, left message ?                             Unable to leave message ?    When were you told to arrive at hospital ?  0715    Do you have a  ?y    Are you on any blood thinners ?     n                If yes when did you stop taking ?    Do you have your prep Rx filled and instruction ?      Nothing to eat the day before , only clear liquids.    Are you experiencing any covid symptoms ? n    Do you have any infections or rash we should be aware of ?      Do you have the Hibiclens soap to use the night before and the morning of surgery ?    Nothing to eat or drink after midnight, only a sip of water to take any medication instructed to take the night before.y  Wear comfortable clothing, leave any valuables at home, remove any jewelry and body piercing . y

## 2024-05-22 ENCOUNTER — ANESTHESIA EVENT (OUTPATIENT)
Dept: ENDOSCOPY | Age: 64
End: 2024-05-22
Payer: COMMERCIAL

## 2024-05-23 ENCOUNTER — HOSPITAL ENCOUNTER (OUTPATIENT)
Age: 64
Setting detail: OUTPATIENT SURGERY
Discharge: HOME | End: 2024-05-23
Attending: INTERNAL MEDICINE | Admitting: INTERNAL MEDICINE
Payer: COMMERCIAL

## 2024-05-23 ENCOUNTER — ANESTHESIA (OUTPATIENT)
Dept: ENDOSCOPY | Age: 64
End: 2024-05-23
Payer: COMMERCIAL

## 2024-05-23 VITALS
HEART RATE: 68 BPM | WEIGHT: 199 LBS | DIASTOLIC BLOOD PRESSURE: 84 MMHG | RESPIRATION RATE: 11 BRPM | SYSTOLIC BLOOD PRESSURE: 127 MMHG | BODY MASS INDEX: 33.97 KG/M2 | TEMPERATURE: 97.2 F | HEIGHT: 64 IN | OXYGEN SATURATION: 97 %

## 2024-05-23 DIAGNOSIS — M79.2 NERVE PAIN: ICD-10-CM

## 2024-05-23 DIAGNOSIS — K21.9 GASTROESOPHAGEAL REFLUX DISEASE, UNSPECIFIED WHETHER ESOPHAGITIS PRESENT: ICD-10-CM

## 2024-05-23 PROCEDURE — 43239 EGD BIOPSY SINGLE/MULTIPLE: CPT | Performed by: INTERNAL MEDICINE

## 2024-05-23 PROCEDURE — 2580000003 HC RX 258: Performed by: ANESTHESIOLOGY

## 2024-05-23 PROCEDURE — 88342 IMHCHEM/IMCYTCHM 1ST ANTB: CPT

## 2024-05-23 PROCEDURE — 88305 TISSUE EXAM BY PATHOLOGIST: CPT

## 2024-05-23 PROCEDURE — 7100000011 HC PHASE II RECOVERY - ADDTL 15 MIN: Performed by: INTERNAL MEDICINE

## 2024-05-23 PROCEDURE — 7100000010 HC PHASE II RECOVERY - FIRST 15 MIN: Performed by: INTERNAL MEDICINE

## 2024-05-23 PROCEDURE — 6360000002 HC RX W HCPCS: Performed by: NURSE ANESTHETIST, CERTIFIED REGISTERED

## 2024-05-23 PROCEDURE — 3609012400 HC EGD TRANSORAL BIOPSY SINGLE/MULTIPLE: Performed by: INTERNAL MEDICINE

## 2024-05-23 PROCEDURE — 3700000000 HC ANESTHESIA ATTENDED CARE: Performed by: INTERNAL MEDICINE

## 2024-05-23 PROCEDURE — 2709999900 HC NON-CHARGEABLE SUPPLY: Performed by: INTERNAL MEDICINE

## 2024-05-23 PROCEDURE — 3700000001 HC ADD 15 MINUTES (ANESTHESIA): Performed by: INTERNAL MEDICINE

## 2024-05-23 RX ORDER — LIDOCAINE HYDROCHLORIDE 10 MG/ML
1 INJECTION, SOLUTION EPIDURAL; INFILTRATION; INTRACAUDAL; PERINEURAL
Status: DISCONTINUED | OUTPATIENT
Start: 2024-05-23 | End: 2024-05-23 | Stop reason: HOSPADM

## 2024-05-23 RX ORDER — FENTANYL CITRATE 50 UG/ML
INJECTION, SOLUTION INTRAMUSCULAR; INTRAVENOUS PRN
Status: DISCONTINUED | OUTPATIENT
Start: 2024-05-23 | End: 2024-05-23 | Stop reason: SDUPTHER

## 2024-05-23 RX ORDER — SODIUM CHLORIDE 9 MG/ML
INJECTION, SOLUTION INTRAVENOUS PRN
Status: DISCONTINUED | OUTPATIENT
Start: 2024-05-23 | End: 2024-05-23 | Stop reason: HOSPADM

## 2024-05-23 RX ORDER — SODIUM CHLORIDE 0.9 % (FLUSH) 0.9 %
5-40 SYRINGE (ML) INJECTION EVERY 12 HOURS SCHEDULED
Status: DISCONTINUED | OUTPATIENT
Start: 2024-05-23 | End: 2024-05-23 | Stop reason: HOSPADM

## 2024-05-23 RX ORDER — LIDOCAINE HYDROCHLORIDE 20 MG/ML
INJECTION, SOLUTION INTRAVENOUS PRN
Status: DISCONTINUED | OUTPATIENT
Start: 2024-05-23 | End: 2024-05-23 | Stop reason: SDUPTHER

## 2024-05-23 RX ORDER — PROPOFOL 10 MG/ML
INJECTION, EMULSION INTRAVENOUS CONTINUOUS PRN
Status: DISCONTINUED | OUTPATIENT
Start: 2024-05-23 | End: 2024-05-23 | Stop reason: SDUPTHER

## 2024-05-23 RX ORDER — SODIUM CHLORIDE, SODIUM LACTATE, POTASSIUM CHLORIDE, CALCIUM CHLORIDE 600; 310; 30; 20 MG/100ML; MG/100ML; MG/100ML; MG/100ML
INJECTION, SOLUTION INTRAVENOUS CONTINUOUS
Status: DISCONTINUED | OUTPATIENT
Start: 2024-05-23 | End: 2024-05-23 | Stop reason: HOSPADM

## 2024-05-23 RX ORDER — SODIUM CHLORIDE 0.9 % (FLUSH) 0.9 %
5-40 SYRINGE (ML) INJECTION PRN
Status: DISCONTINUED | OUTPATIENT
Start: 2024-05-23 | End: 2024-05-23 | Stop reason: HOSPADM

## 2024-05-23 RX ADMIN — SODIUM CHLORIDE, POTASSIUM CHLORIDE, SODIUM LACTATE AND CALCIUM CHLORIDE: 600; 310; 30; 20 INJECTION, SOLUTION INTRAVENOUS at 08:23

## 2024-05-23 RX ADMIN — FENTANYL CITRATE 25 MCG: 50 INJECTION, SOLUTION INTRAMUSCULAR; INTRAVENOUS at 09:22

## 2024-05-23 RX ADMIN — PROPOFOL 125 MCG/KG/MIN: 10 INJECTION, EMULSION INTRAVENOUS at 09:19

## 2024-05-23 RX ADMIN — LIDOCAINE HYDROCHLORIDE 60 MG: 20 INJECTION INTRAVENOUS at 09:09

## 2024-05-23 RX ADMIN — FENTANYL CITRATE 50 MCG: 50 INJECTION, SOLUTION INTRAMUSCULAR; INTRAVENOUS at 09:09

## 2024-05-23 RX ADMIN — FENTANYL CITRATE 25 MCG: 50 INJECTION, SOLUTION INTRAMUSCULAR; INTRAVENOUS at 09:17

## 2024-05-23 RX ADMIN — PROPOFOL 100 MG: 10 INJECTION, EMULSION INTRAVENOUS at 09:18

## 2024-05-23 ASSESSMENT — PAIN DESCRIPTION - DESCRIPTORS: DESCRIPTORS: ACHING

## 2024-05-23 ASSESSMENT — ENCOUNTER SYMPTOMS
ABDOMINAL PAIN: 1
NAUSEA: 1
VOMITING: 1
SHORTNESS OF BREATH: 1
RESPIRATORY NEGATIVE: 1

## 2024-05-23 ASSESSMENT — PAIN - FUNCTIONAL ASSESSMENT
PAIN_FUNCTIONAL_ASSESSMENT: 0-10
PAIN_FUNCTIONAL_ASSESSMENT: 0-10

## 2024-05-23 NOTE — DISCHARGE INSTRUCTIONS
provide you with information on caring for yourself after your procedure. Your caregiver may also give you more specific instructions. Your treatment has been planned according to current medical practices, but problems sometimes occur. Call your caregiver if you have any problems or questions after your procedure.   HOME CARE INSTRUCTIONS   Do not participate in any activities that require you to be alert or coordinated. Do not:  Drive.  Swim.  Ride a bicycle.  Operate heavy machinery.  Cook.  Use power tools.  Climb ladders.  Work at heights.  Take a bath.  Do not drink alcohol.  Do not make any important decisions or sign legal documents.  Stay with an adult.  The first meal following your procedure should be light and small. Avoid solid foods if you feel sick to your stomach (nauseous) or if you throw up (vomit).  Drink enough fluids to keep your urine clear or pale yellow.  Only take your usual medicines or new medicines if your caregiver approves them.  Only take over-the-counter or prescription medicines for pain, discomfort, or fever as directed by your caregiver.  Keep all follow-up appointments as directed by your caregiver.  SEEK IMMEDIATE MEDICAL CARE IF:   You are not feeling normal or behaving normally after 24 hours.  You have persistent nausea and vomiting.  You are unable to drink fluids or eat food.  You have difficulty urinating.  You have difficulty breathing or speaking.  You have blue or gray skin.  There is difficulty waking or you cannot be woken up.  You have heavy bleeding, redness, or a lot of swelling where the sedative or anesthesia entered your skin (intravenous site).  You have a rash.  MAKE SURE YOU:  Understand these instructions.  Will watch your condition.  Will get help right away if you are not doing well or get worse.  Document Released: 12/18/2006 Document Revised: 06/18/2013 Document Reviewed: 04/17/2013  [employer]® Patient Information ©2013 [employer].

## 2024-05-23 NOTE — OP NOTE
EGD report    Esophagogastroduodenoscopy (EGD) Procedure Note    Procedure:  EGD with biopsies    Procedure Date: 5/23/2024    Indications:  Dyspepsia/GERD, symptoms not improving with PPI    Sedation: MAC    Attending Physician:  Dr. Parish Shannon MD    Assistant: None    Procedure Details:    Informed consent was obtained for the procedure, including sedation. Risks of infection, perforation, hemorrhage, adverse drug reaction, and aspiration were discussed. The patient was placed in the left lateral decubitus position. The patient was monitored continuously with ECG tracing, pulse oximetry, blood pressure monitoring, and direct observation.      The gastroscope was inserted into the mouth and advanced under direct vision to second portion of the duodenum.  A careful inspection was made as the gastroscope was withdrawn, including a retroflexed view of the proximal stomach; findings and interventions are described below. Appropriate photodocumentation was obtained.    Findings:  Retropharyngeal area was grossly normal appearing     Esophagus: Abnormal salmon-colored mucosa that was noted extending above the squamocolumnar junction.  This was roughly 2 to 3 cm.  Biopsies obtained to rule out Olivia's esophagus.  Mild reflux changes also observed.     Stomach:    Fundus: 2 diminutive polyps noted.  Likely fundic gland.    Body: normal    Antrum: Patchy erythema, biopsies obtained to rule out H. pylori     Duodenum:   Discolored mucosa noted throughout.  Biopsies obtained to rule out celiac disease.      Complications:  None           Estimated blood loss:  Minimal    Disposition:  [facility]           Condition: Stable    Specimen Removed: Distal esophagus, stomach, duodenum    Impression:    Abnormal salmon-colored mucosa extending 3 cm above this GE junction, biopsies obtained to rule out GERD/Olivia's.  Diminutive polyps x 2 likely fundic gland polyps.  Erythematous gastropathy, biopsies obtained to rule out  H. pylori.  Discolored mucosa in the duodenum, biopsies to rule out celiac disease.    Recommendations:  Follow pathology results.  Continue antireflux therapy.  Will likely have to adjust further in the clinic.  Follow-up in clinic    Attending Attestation: I performed the procedure    Parish Shannon MD

## 2024-05-23 NOTE — ANESTHESIA POSTPROCEDURE EVALUATION
Department of Anesthesiology  Postprocedure Note    Patient: Jayshree Espinal  MRN: [id number]  YOB: 1960  Date of evaluation: 5/23/2024    Procedure Summary       Date: 05/23/24 Room / Location: [place] [address] / [facility]    Anesthesia Start: 0903 Anesthesia Stop: 0933    Procedure: ESOPHAGOGASTRODUODENOSCOPY BIOPSY (Esophagus) Diagnosis:       Gastroesophageal reflux disease, unspecified whether esophagitis present      (Gastroesophageal reflux disease, unspecified whether esophagitis present [K21.9])    Surgeons: Parish Shannon MD Responsible Provider: Gege Gabriel MD    Anesthesia Type: general ASA Status: 3            Anesthesia Type: No value filed.    Gerardo Phase I:      Gerardo Phase II: Gerardo Score: 10    Anesthesia Post Evaluation    Comments: POST- ANESTHESIA EVALUATION       Pt Name: Jayshree Espinal  MRN: [id number]  YOB: 1960  Date of evaluation: 5/23/2024  Time:  3:15 PM      /84   Pulse 68   Temp 97.2 °F (36.2 °C)   Resp 11   Ht 1.626 m (5' 4\")   Wt 90.3 kg (199 lb)   SpO2 97%   BMI 34.16 kg/m²      Consciousness Level  Awake  Cardiopulmonary Status  Stable  Pain Adequately Treated YES  Nausea / Vomiting  NO  Adequate Hydration  YES  Anesthesia Related Complications NONE      Electronically signed by Gege Gabriel MD on 5/23/2024 at 3:15 PM           No notable events documented.

## 2024-05-23 NOTE — H&P
HISTORY and PHYSICAL  [facility]       NAME:  Jayshree Espinal  MRN: [id number]   YOB: 1960   Date: 5/23/2024   Age: 64 y.o.  Gender: female       COMPLAINT AND PRESENT HISTORY:     Jayshree Espinal is 64 y.o.,  female, presents for ESOPHAGOGASTRODUODENOSCOPY BIOPSY   Primary dx: Gastroesophageal reflux disease, unspecified whether esophagitis present [K21.9].    HPI:    Jayshree Espinal is 64 y.o.,  female, undergoing for EGD.   Patient has no previous endoscopies done before   Patient denies any FH of Esophogeal Cancer.   Patient complains of frequent heartburn, Pt has hx of GERD   and is taking Pepcid and  Prilosec with no relief . Pt denies feeling of Sore throat, hoarseness .  Pt denies  regurgitation. Some time she has difficulty swallow her food but drinking fluid does help. Patient complains of intermittent epigastric discomfort, with  nausea and vomiting started over one year. She has intermittent diarrhea / constipation, some times she notice her stool color is very light . No fever or chills, no chest pain or SOB       Review of additional significant medical hx:  (See chart for additional detail, including current medications /see ROS for current S/S):       NPO status: pt Npo since the past midnight   Medications taken TODAY (with sip of water): pt took all her am medication with sip of water  Anticoagulation status: none  Denies personal hx of blood clots.  Denies personal hx of MRSA infection.  Denies any personal or family hx of previous complications w/anesthesia.  :  PAST MEDICAL HISTORY     Past Medical History:   Diagnosis Date    Arthritis     Decreased vision of right eye     Dental bridge present     permanent left upper    Deviated septum     Dry skin     GERD (gastroesophageal reflux disease)     History of chest pain     past,stress test negative,poss acid reflux    Kidney stones     Migraine     Sinus infection 01/04/2018    on antibiotics x 10 days,better no cough  Resource Strain: Low Risk  (9/7/2023)    Overall Financial Resource Strain (CARDIA)     Difficulty of Paying Living Expenses: Not hard at all   Transportation Needs: Unknown (9/7/2023)    PRAPARE - Transportation     Lack of Transportation (Non-Medical): No   Housing Stability: Unknown (9/7/2023)    Housing Stability Vital Sign     Unstable Housing in the Last Year: No           REVIEW OF SYSTEMS      Allergies   Allergen Reactions    Food      \"ANCHOVIES MAKE MY MOUTH SWELL\"    Sulfa Antibiotics Itching       No current facility-administered medications on file prior to encounter.     Current Outpatient Medications on File Prior to Encounter   Medication Sig Dispense Refill    omeprazole (PRILOSEC) 20 MG delayed release capsule Take 1 capsule by mouth every morning (before breakfast) 90 capsule 1    PREVIDENT 5000 DRY MOUTH 1.1 % GEL Apply 1 application to the teeth twice a day; Brush Teeth twice daily. Do not rinse. DO NOT SWALLOW      pregabalin (LYRICA) 50 MG capsule Take 1 capsule by mouth 2 times daily for 90 days. Max Daily Amount: 100 mg (Patient taking differently: Take 1 capsule by mouth 3 times daily.) 180 capsule 0    famotidine (PEPCID) 20 MG tablet TAKE 1 TABLET BY MOUTH TWO TIMES A  tablet 0    magnesium 200 MG TABS tablet Take 1 tablet by mouth daily      Ascorbic Acid (VITAMIN C) 1000 MG tablet Take 1 tablet by mouth 2 times daily         Review of Systems   Constitutional: Negative.    HENT: Negative.     Eyes:  Positive for visual disturbance.   Respiratory: Negative.     Cardiovascular: Negative.    Gastrointestinal:  Positive for abdominal pain, nausea and vomiting.   Genitourinary: Negative.    Musculoskeletal: Negative.         Right knee pain    Skin: Negative.    Neurological:  Positive for numbness (in the left lateral leg and in the right hand).   Hematological: Negative.    Psychiatric/Behavioral: Negative.           GENERAL PHYSICAL EXAM     Vitals: See nursing flow sheet for

## 2024-05-23 NOTE — ANESTHESIA PRE PROCEDURE
4/17/23   Whitney Cerrato PA-C   magnesium 200 MG TABS tablet Take 1 tablet by mouth daily  Patient not taking: Reported on 5/23/2024    ProviderShamir MD   Ascorbic Acid (VITAMIN C) 1000 MG tablet Take 1 tablet by mouth 2 times daily    Shamir Graff MD       Current medications:    Current Facility-Administered Medications   Medication Dose Route Frequency Provider Last Rate Last Admin    lidocaine PF 1 % injection 1 mL  1 mL IntraDERmal Once PRN Kumar Zamora MD        lactated ringers IV soln infusion   IntraVENous Continuous Kumar Zamora  mL/hr at 05/23/24 0823 New Bag at 05/23/24 0823    sodium chloride flush 0.9 % injection 5-40 mL  5-40 mL IntraVENous 2 times per day Kumar Zamora MD        sodium chloride flush 0.9 % injection 5-40 mL  5-40 mL IntraVENous PRN Kumar Zamora MD        0.9 % sodium chloride infusion   IntraVENous PRN Kumar Zamora MD           Allergies:    Allergies   Allergen Reactions    Food      \"ANCHOVIES MAKE MY MOUTH SWELL\"    Sulfa Antibiotics Itching       Problem List:    Patient Active Problem List   Diagnosis Code    Abnormal weight gain R63.5    Allergic rhinitis J30.9    Autonomic nervous system disorder G90.9    Compound nevus D22.9    Hyperlipidemia E78.5    Insomnia G47.00    Limb pain M79.609    Migraine with aura G43.109    Myalgia and myositis TAR0190    Neoplasm of uncertain behavior of skin D48.5    Perioral dermatitis L71.0    Polyarthritis M13.0    Other headache syndrome G44.89    Obstructive sleep apnea syndrome G47.33    Family history of breast cancer Z80.3    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    Macular pucker, right eye H35.371    Chronic iritis, right eye H20.11    TMJ (temporomandibular joint disorder) M26.609    Environmental allergies Z91.09    Chronic fatigue R53.82    Disorder of palate K13.79    Abnormal EKG R94.31    Gastroesophageal reflux disease K21.9    Neck stiffness M43.6       Past Medical History:

## 2024-05-26 DIAGNOSIS — M79.2 NERVE PAIN: ICD-10-CM

## 2024-05-29 LAB — SURGICAL PATHOLOGY REPORT: NORMAL

## 2024-05-29 RX ORDER — PREGABALIN 50 MG/1
CAPSULE ORAL
Qty: 180 CAPSULE | OUTPATIENT
Start: 2024-05-29

## 2024-06-03 DIAGNOSIS — M79.2 NERVE PAIN: ICD-10-CM

## 2024-06-03 DIAGNOSIS — G43.109 MIGRAINE WITH AURA AND WITHOUT STATUS MIGRAINOSUS, NOT INTRACTABLE: Primary | ICD-10-CM

## 2024-06-03 RX ORDER — PREGABALIN 50 MG/1
50 CAPSULE ORAL 3 TIMES DAILY
Qty: 270 CAPSULE | Refills: 1 | Status: SHIPPED | OUTPATIENT
Start: 2024-06-03 | End: 2024-11-30

## 2024-06-03 RX ORDER — AMITRIPTYLINE HYDROCHLORIDE 25 MG/1
25 TABLET, FILM COATED ORAL NIGHTLY
Qty: 90 TABLET | Refills: 1 | Status: SHIPPED | OUTPATIENT
Start: 2024-06-03 | End: 2024-11-30

## 2024-06-03 RX ORDER — PREGABALIN 50 MG/1
50 CAPSULE ORAL 3 TIMES DAILY
Qty: 90 CAPSULE | Refills: 0 | Status: SHIPPED | OUTPATIENT
Start: 2024-06-03 | End: 2024-07-03

## 2024-06-03 NOTE — TELEPHONE ENCOUNTER
Pt called in stating that her PCP will not refill the Lyrica because we increased the dose to TID. She is asking that we send in a new prescription for her. She will need a 30 day supply to [facility] in [place] and 90 day supply to [facility].    She will also need a 90 day supply of Elavil sent in, she said it is working great.

## 2024-06-12 ENCOUNTER — OFFICE VISIT (OUTPATIENT)
Dept: NEUROLOGY | Age: 64
End: 2024-06-12
Payer: COMMERCIAL

## 2024-06-12 VITALS
HEIGHT: 64 IN | HEART RATE: 72 BPM | WEIGHT: 199 LBS | SYSTOLIC BLOOD PRESSURE: 125 MMHG | BODY MASS INDEX: 33.97 KG/M2 | DIASTOLIC BLOOD PRESSURE: 81 MMHG

## 2024-06-12 DIAGNOSIS — G43.109 MIGRAINE WITH AURA AND WITHOUT STATUS MIGRAINOSUS, NOT INTRACTABLE: Primary | ICD-10-CM

## 2024-06-12 DIAGNOSIS — M54.16 LUMBAR RADICULAR PAIN: ICD-10-CM

## 2024-06-12 PROCEDURE — 3017F COLORECTAL CA SCREEN DOC REV: CPT | Performed by: STUDENT IN AN ORGANIZED HEALTH CARE EDUCATION/TRAINING PROGRAM

## 2024-06-12 PROCEDURE — 99214 OFFICE O/P EST MOD 30 MIN: CPT | Performed by: STUDENT IN AN ORGANIZED HEALTH CARE EDUCATION/TRAINING PROGRAM

## 2024-06-12 PROCEDURE — 1090F PRES/ABSN URINE INCON ASSESS: CPT | Performed by: STUDENT IN AN ORGANIZED HEALTH CARE EDUCATION/TRAINING PROGRAM

## 2024-06-12 PROCEDURE — 1123F ACP DISCUSS/DSCN MKR DOCD: CPT | Performed by: STUDENT IN AN ORGANIZED HEALTH CARE EDUCATION/TRAINING PROGRAM

## 2024-06-12 PROCEDURE — G2211 COMPLEX E/M VISIT ADD ON: HCPCS | Performed by: STUDENT IN AN ORGANIZED HEALTH CARE EDUCATION/TRAINING PROGRAM

## 2024-06-12 PROCEDURE — G8427 DOCREV CUR MEDS BY ELIG CLIN: HCPCS | Performed by: STUDENT IN AN ORGANIZED HEALTH CARE EDUCATION/TRAINING PROGRAM

## 2024-06-12 PROCEDURE — G8417 CALC BMI ABV UP PARAM F/U: HCPCS | Performed by: STUDENT IN AN ORGANIZED HEALTH CARE EDUCATION/TRAINING PROGRAM

## 2024-06-12 PROCEDURE — 1036F TOBACCO NON-USER: CPT | Performed by: STUDENT IN AN ORGANIZED HEALTH CARE EDUCATION/TRAINING PROGRAM

## 2024-06-12 PROCEDURE — G8399 PT W/DXA RESULTS DOCUMENT: HCPCS | Performed by: STUDENT IN AN ORGANIZED HEALTH CARE EDUCATION/TRAINING PROGRAM

## 2024-06-12 NOTE — PROGRESS NOTES
[facility]            [address], [address]          [place], [place] [address]          Dept: [phone]          Dept Fax: [phone]          6/12/2024    HISTORY OF PRESENT ILLNESS:       I had the pleasure of seeing Jayshree Espinal who presents for a follow up.     Interim History:    For migraine prophylaxis, patient is taking amitriptyline 25 mg daily.  Amitriptyline was increased at last clinic visit. She notes her migraines are well controlled and occur about once a week and are not severe. She notes they are mild and don't require abortive therapy.     For lumbar radiculopathy, patient is taking Lyrica 50 mg 3 times a day.  Patient is currently in PT and notes is has been effective. She notes her radicular pain is well controlled with current dose. When her pain does flare up, it is easily controlled with taking her lyrica.       Prior History:    She was prescribed a steroid taper at last visit and started on elavil 10 mg nightly.  She notes headaches have significantly improved since last visit. She notes she doesn't wake up with headaches anymore but notes they occur near the end of the afternoon. She rates a 3/10 in severity. She notes she is now have pain in her LLE. She was started on lyrica and she notes it has helped. She reports it feels like shooting pain in her left leg down to her toes. She notes sitting improves the pain. Prolonged standing worsens the pain. She notes she has back pain but doesn't radiate her leg. She notes this pain started ten days ago. She notes prior to this it occurred in 2020 and resolved.     MRI L spine 2021:  IMPRESSION:  1. Mild spinal canal stenosis at L4-L5 with minimal stenosis at L3-L4.  2. Neural foraminal narrowing at L2-L3 through L5-S1 as above.  3. Minimal levoscoliosis without spondylolisthesis.    Prior History:  Headaches  Headaches started almost 6 months. She notes she has always have had sinus headaches that have

## 2024-06-14 ENCOUNTER — OFFICE VISIT (OUTPATIENT)
Dept: PRIMARY CARE CLINIC | Age: 64
End: 2024-06-14
Payer: MEDICARE

## 2024-06-14 VITALS
BODY MASS INDEX: 33.49 KG/M2 | WEIGHT: 196.2 LBS | HEART RATE: 82 BPM | DIASTOLIC BLOOD PRESSURE: 82 MMHG | OXYGEN SATURATION: 95 % | SYSTOLIC BLOOD PRESSURE: 112 MMHG | HEIGHT: 64 IN

## 2024-06-14 DIAGNOSIS — Z23 NEED FOR VACCINATION: ICD-10-CM

## 2024-06-14 DIAGNOSIS — H10.32 ACUTE CONJUNCTIVITIS OF LEFT EYE, UNSPECIFIED ACUTE CONJUNCTIVITIS TYPE: Primary | ICD-10-CM

## 2024-06-14 PROCEDURE — G0009 ADMIN PNEUMOCOCCAL VACCINE: HCPCS | Performed by: PHYSICIAN ASSISTANT

## 2024-06-14 PROCEDURE — 1123F ACP DISCUSS/DSCN MKR DOCD: CPT | Performed by: PHYSICIAN ASSISTANT

## 2024-06-14 PROCEDURE — G8417 CALC BMI ABV UP PARAM F/U: HCPCS | Performed by: PHYSICIAN ASSISTANT

## 2024-06-14 PROCEDURE — 3017F COLORECTAL CA SCREEN DOC REV: CPT | Performed by: PHYSICIAN ASSISTANT

## 2024-06-14 PROCEDURE — 1036F TOBACCO NON-USER: CPT | Performed by: PHYSICIAN ASSISTANT

## 2024-06-14 PROCEDURE — 99213 OFFICE O/P EST LOW 20 MIN: CPT | Performed by: PHYSICIAN ASSISTANT

## 2024-06-14 PROCEDURE — 1090F PRES/ABSN URINE INCON ASSESS: CPT | Performed by: PHYSICIAN ASSISTANT

## 2024-06-14 PROCEDURE — 90677 PCV20 VACCINE IM: CPT | Performed by: PHYSICIAN ASSISTANT

## 2024-06-14 PROCEDURE — G8399 PT W/DXA RESULTS DOCUMENT: HCPCS | Performed by: PHYSICIAN ASSISTANT

## 2024-06-14 PROCEDURE — G8427 DOCREV CUR MEDS BY ELIG CLIN: HCPCS | Performed by: PHYSICIAN ASSISTANT

## 2024-06-14 RX ORDER — OLOPATADINE HYDROCHLORIDE 2 MG/ML
1 SOLUTION/ DROPS OPHTHALMIC DAILY
Qty: 1 EACH | Refills: 0 | Status: SHIPPED | OUTPATIENT
Start: 2024-06-14

## 2024-06-14 ASSESSMENT — ENCOUNTER SYMPTOMS
EYE PAIN: 1
RESPIRATORY NEGATIVE: 1
PHOTOPHOBIA: 1
EYE REDNESS: 0
EYE ITCHING: 1
RHINORRHEA: 0
EYE DISCHARGE: 1

## 2024-06-14 NOTE — PROGRESS NOTES
(VITAMIN C) 1000 MG tablet Take 1 tablet by mouth 2 times daily       No current facility-administered medications for this visit.     Allergies   Allergen Reactions    Food      \"ANCHOVIES MAKE MY MOUTH SWELL\"    Sulfa Antibiotics Itching       Subjective:      Review of Systems   Constitutional:  Negative for chills, diaphoresis and fever.   HENT:  Negative for congestion, rhinorrhea and sneezing.    Eyes:  Positive for photophobia, pain, discharge and itching. Negative for redness and visual disturbance.   Respiratory: Negative.     Cardiovascular: Negative.    Skin:  Negative for rash.   Allergic/Immunologic: Positive for environmental allergies (better on Singular).   Hematological:  Negative for adenopathy.       Objective:     /82   Pulse 82   Ht 1.626 m (5' 4\")   Wt 89 kg (196 lb 3.2 oz)   SpO2 95%   BMI 33.68 kg/m²   Physical Exam  Vitals and nursing note reviewed.   Constitutional:       Appearance: Normal appearance. She is not ill-appearing.   Eyes:      General: Lids are normal.         Right eye: No foreign body, discharge or hordeolum.         Left eye: No foreign body, discharge or hordeolum.      Conjunctiva/sclera:      Right eye: Right conjunctiva is not injected. No chemosis, exudate or hemorrhage.     Left eye: Left conjunctiva is not injected. No chemosis, exudate or hemorrhage.       Comments: Area marked is where she notices discharge   Cardiovascular:      Rate and Rhythm: Normal rate.   Pulmonary:      Effort: Pulmonary effort is normal.   Neurological:      Mental Status: She is alert.         Assessment:       Diagnosis Orders   1. Acute conjunctivitis of left eye, unspecified acute conjunctivitis type  olopatadine (PATADAY) 0.2 % SOLN ophthalmic solution      2. Need for vaccination  Pneumococcal, PCV20, PREVNAR 20, (age 6w+), IM, PF           Plan:   Pataday for allergic conjunctivitis and if not improving, then see eye doctor  Prevnar 20 today    Assessment & Plan   Return

## 2024-06-19 ENCOUNTER — TELEPHONE (OUTPATIENT)
Dept: PRIMARY CARE CLINIC | Age: 64
End: 2024-06-19

## 2024-06-19 DIAGNOSIS — H57.89 EYE DISCHARGE: ICD-10-CM

## 2024-06-19 DIAGNOSIS — H54.7 DECREASED VISION: Primary | ICD-10-CM

## 2024-06-19 NOTE — TELEPHONE ENCOUNTER
Pt [facility] [place] [facility] and decreased vision in both eyes.

## 2024-07-09 ENCOUNTER — OFFICE VISIT (OUTPATIENT)
Dept: GASTROENTEROLOGY | Age: 64
End: 2024-07-09
Payer: MEDICARE

## 2024-07-09 VITALS
RESPIRATION RATE: 19 BRPM | WEIGHT: 196.8 LBS | SYSTOLIC BLOOD PRESSURE: 133 MMHG | HEART RATE: 86 BPM | HEIGHT: 64 IN | BODY MASS INDEX: 33.6 KG/M2 | DIASTOLIC BLOOD PRESSURE: 93 MMHG | OXYGEN SATURATION: 95 % | TEMPERATURE: 97.4 F

## 2024-07-09 DIAGNOSIS — K75.81 METABOLIC DYSFUNCTION-ASSOCIATED STEATOHEPATITIS (MASH): Primary | ICD-10-CM

## 2024-07-09 DIAGNOSIS — K22.70 BARRETT'S ESOPHAGUS WITHOUT DYSPLASIA: ICD-10-CM

## 2024-07-09 DIAGNOSIS — K21.9 GASTROESOPHAGEAL REFLUX DISEASE WITHOUT ESOPHAGITIS: ICD-10-CM

## 2024-07-09 PROCEDURE — G8417 CALC BMI ABV UP PARAM F/U: HCPCS | Performed by: INTERNAL MEDICINE

## 2024-07-09 PROCEDURE — 3017F COLORECTAL CA SCREEN DOC REV: CPT | Performed by: INTERNAL MEDICINE

## 2024-07-09 PROCEDURE — 99214 OFFICE O/P EST MOD 30 MIN: CPT | Performed by: INTERNAL MEDICINE

## 2024-07-09 PROCEDURE — G2211 COMPLEX E/M VISIT ADD ON: HCPCS | Performed by: INTERNAL MEDICINE

## 2024-07-09 PROCEDURE — G8427 DOCREV CUR MEDS BY ELIG CLIN: HCPCS | Performed by: INTERNAL MEDICINE

## 2024-07-09 PROCEDURE — 1036F TOBACCO NON-USER: CPT | Performed by: INTERNAL MEDICINE

## 2024-07-09 PROCEDURE — 1123F ACP DISCUSS/DSCN MKR DOCD: CPT | Performed by: INTERNAL MEDICINE

## 2024-07-09 PROCEDURE — 1090F PRES/ABSN URINE INCON ASSESS: CPT | Performed by: INTERNAL MEDICINE

## 2024-07-09 PROCEDURE — G8399 PT W/DXA RESULTS DOCUMENT: HCPCS | Performed by: INTERNAL MEDICINE

## 2024-07-09 RX ORDER — FAMOTIDINE 20 MG/1
20 TABLET, FILM COATED ORAL NIGHTLY
Qty: 180 TABLET | Refills: 0 | Status: SHIPPED | OUTPATIENT
Start: 2024-07-09

## 2024-07-09 RX ORDER — OMEPRAZOLE 20 MG/1
20 CAPSULE, DELAYED RELEASE ORAL
Qty: 180 CAPSULE | Refills: 1 | Status: SHIPPED | OUTPATIENT
Start: 2024-07-09 | End: 2024-10-07

## 2024-07-09 ASSESSMENT — ENCOUNTER SYMPTOMS
TROUBLE SWALLOWING: 1
NAUSEA: 0
VOMITING: 0
ANAL BLEEDING: 0
COUGH: 1
DIARRHEA: 0
WHEEZING: 1
ABDOMINAL PAIN: 1
RECTAL PAIN: 0
CONSTIPATION: 0
COLOR CHANGE: 0
SORE THROAT: 0
BLOOD IN STOOL: 0
CHOKING: 0
ABDOMINAL DISTENTION: 1

## 2024-07-09 NOTE — PROGRESS NOTES
Cardiovascular: Normal rate, regular rhythm, normal heart sounds and intact distal pulses.   Pulmonary/Chest: Effort normal and breath sounds normal. No stridor. No respiratory distress. He has no wheezes. He has no rales. He exhibits no tenderness.   Abdominal: Soft. Bowel sounds are normal. He exhibits no distension and no mass. There is no tenderness. There is no rebound and no guarding. No hernia.   Musculoskeletal: Normal range of motion.   Lymphadenopathy:    Patient has no cervical adenopathy.   Neurological: Patient is alert and oriented to person, place, and time.   Psychiatric: Patient has a normal mood and affect. Patient behavior is normal.       LABORATORY DATA: Reviewed  Lab Results   Component Value Date    WBC 10.7 06/22/2021    HGB 13.8 06/22/2021    HCT 43.9 06/22/2021    MCV 88.5 06/22/2021     06/22/2021     (H) 03/04/2022    K 4.7 03/04/2022     03/04/2022    CO2 24 03/04/2022    BUN 17 03/04/2022    CREATININE 0.7 09/27/2023    BILITOT 0.4 03/22/2024    ALKPHOS 70 03/22/2024    AST 32 03/22/2024    ALT 46 (H) 03/22/2024         Lab Results   Component Value Date    RBC 4.96 06/22/2021    HGB 13.8 06/22/2021    MCV 88.5 06/22/2021    MCH 27.8 06/22/2021    MCHC 31.4 06/22/2021    RDW 15.6 (H) 06/22/2021    MPV 9.9 06/22/2021    BASOPCT 1 06/22/2021    LYMPHSABS 2.89 06/22/2021    MONOSABS 0.53 06/22/2021    NEUTROABS 6.89 06/22/2021    EOSABS 0.33 06/22/2021    BASOSABS 0.05 06/22/2021         IMPRESSION: Ms. Espinal is a 64 y.o. female with a past history remarkable for obesity, GERD, sleep apnea,, referred for evaluation of GERD.     Assessment  1. Metabolic dysfunction-associated steatohepatitis (MASH)    2. Olivia's esophagus without dysplasia    3. Gastroesophageal reflux disease without esophagitis      Plan:  -Discussed the findings of EGD and biopsy results.  Given her Olivia's esophagus and residual upper GI symptoms, will further optimize her antireflux therapy with

## 2024-08-19 NOTE — PROGRESS NOTES
[facility]  [address]   Phone: [phone]  Fax: [phone]    Sarath Wilson is a 58 y.o. female who presents today for her medical conditions/complaintsas noted below. Chief Complaint   Patient presents with    Headache     Pt is here for daily headaches. Pt states this started over a month ago. Pt stated its not as bad as a migraine but it is severe and it everyday in the afternoon     Oral Swelling     Pt states she has been feeling tightness everyday. Pt states this also started a little over x1 month ago. Pt states she has to be careful while she swallow and hard to go to sleep at night     Discuss Labs     Pt did get blood work yesterday at [place]     Other     Pt states she would live the shingles vaccine but she doesn't know if she will have to pay extra in her insurance           HPI:     Headache  Other  Associated symptoms include fatigue and headaches. Pertinent negatives include no chest pain, chills, congestion, coughing, diaphoresis, fever, nausea, neck pain, rash, sore throat, vomiting or weakness. Labs were not available while she was here in office. Harriet states that she is having difficulty sleeping due to a \"choking feeling in throat\" when laying down and pain in her right knee. Has some swelling again in right neck--had a sublingual cyst in past that was debulked, not removed--was not malignant. She did not mention headaches. Right knee pain--tylenol and ibuprofen not helping. It is not red, hot or swollen. Pain has been since knee replacement in May. She used to take etodalac which helped much more.       Current Outpatient Medications   Medication Sig Dispense Refill    pregabalin (LYRICA) 50 MG capsule TAKE 1 CAPSULE BY MOUTH TWICE  DAILY FOR 90 DAYS 180 capsule 0    aspirin 81 MG chewable tablet Take 1 tablet by mouth daily      famotidine (PEPCID) 20 MG tablet TAKE 1 TABLET BY MOUTH TWO TIMES A
Detail Level: Simple
Detail Level: Detailed

## 2024-09-24 RX ORDER — NAPROXEN 500 MG/1
500 TABLET ORAL 2 TIMES DAILY WITH MEALS
Qty: 60 TABLET | Refills: 3 | Status: SHIPPED | OUTPATIENT
Start: 2024-09-24

## 2024-10-29 DIAGNOSIS — G43.109 MIGRAINE WITH AURA AND WITHOUT STATUS MIGRAINOSUS, NOT INTRACTABLE: ICD-10-CM

## 2024-10-29 DIAGNOSIS — M79.2 NERVE PAIN: ICD-10-CM

## 2024-10-29 NOTE — TELEPHONE ENCOUNTER
Pharmacy requesting refill of Elavil 25 mg.      Medication active on med list yes      Date of last Rx: 6/3/24 with 1 refills          verified by LILY MURILLO      Date of last appointment 6/12/24    Next Visit Date:  11/12/24

## 2024-11-12 ENCOUNTER — OFFICE VISIT (OUTPATIENT)
Dept: NEUROLOGY | Age: 64
End: 2024-11-12
Payer: MEDICARE

## 2024-11-12 VITALS
WEIGHT: 206 LBS | HEIGHT: 64 IN | SYSTOLIC BLOOD PRESSURE: 144 MMHG | DIASTOLIC BLOOD PRESSURE: 86 MMHG | BODY MASS INDEX: 35.17 KG/M2 | HEART RATE: 72 BPM

## 2024-11-12 DIAGNOSIS — G43.109 MIGRAINE WITH AURA AND WITHOUT STATUS MIGRAINOSUS, NOT INTRACTABLE: Primary | ICD-10-CM

## 2024-11-12 DIAGNOSIS — M54.16 LUMBAR RADICULAR PAIN: ICD-10-CM

## 2024-11-12 DIAGNOSIS — M77.11 LATERAL EPICONDYLITIS OF RIGHT ELBOW: ICD-10-CM

## 2024-11-12 DIAGNOSIS — R53.83 OTHER FATIGUE: ICD-10-CM

## 2024-11-12 DIAGNOSIS — M54.2 CERVICALGIA: ICD-10-CM

## 2024-11-12 PROCEDURE — 1123F ACP DISCUSS/DSCN MKR DOCD: CPT | Performed by: PHYSICIAN ASSISTANT

## 2024-11-12 PROCEDURE — G8482 FLU IMMUNIZE ORDER/ADMIN: HCPCS | Performed by: PHYSICIAN ASSISTANT

## 2024-11-12 PROCEDURE — 1036F TOBACCO NON-USER: CPT | Performed by: PHYSICIAN ASSISTANT

## 2024-11-12 PROCEDURE — G8427 DOCREV CUR MEDS BY ELIG CLIN: HCPCS | Performed by: PHYSICIAN ASSISTANT

## 2024-11-12 PROCEDURE — 1090F PRES/ABSN URINE INCON ASSESS: CPT | Performed by: PHYSICIAN ASSISTANT

## 2024-11-12 PROCEDURE — G8399 PT W/DXA RESULTS DOCUMENT: HCPCS | Performed by: PHYSICIAN ASSISTANT

## 2024-11-12 PROCEDURE — 99214 OFFICE O/P EST MOD 30 MIN: CPT | Performed by: PHYSICIAN ASSISTANT

## 2024-11-12 PROCEDURE — 3017F COLORECTAL CA SCREEN DOC REV: CPT | Performed by: PHYSICIAN ASSISTANT

## 2024-11-12 PROCEDURE — G8417 CALC BMI ABV UP PARAM F/U: HCPCS | Performed by: PHYSICIAN ASSISTANT

## 2024-11-12 RX ORDER — AMITRIPTYLINE HYDROCHLORIDE 10 MG/1
20 TABLET ORAL NIGHTLY
Qty: 60 TABLET | Refills: 3 | Status: SHIPPED | OUTPATIENT
Start: 2024-11-12

## 2024-11-12 NOTE — PROGRESS NOTES
[id number] [facility] [id number]  Dept: [phone]    PATIENT NAME: Jayshree Espinal  PATIENT MRN: [id number]  PRIMARY CARE PHYSICIAN: Whitney Cerrato PA-C    HPI:      Jayshree Espinal is a 64 y.o. female who presents to clinic today regarding history of migraine and new issue of right arm pain and weakness.    She receives Pregablin 50 mg TID for presumed lumbar radicular pain. She has been taking it twice daily consistently, with midday dose PRN.     Reports that starting in August she has developed right elbow/ forearm pain described as muscle aching. It seems worse in the evening and at night. Pain can wake her from sleep. There is associated weakness of the arm. Not provoked by certain activities. There is no shoulder pain. No radiation of pain from neck down shoulder/ arms. She has noticed that her arm is more painful when she has neck pain. She feels numbness if right thumb, index finger and itching sensation of palm. No numbness extending up her forearm.  No left hand numbness. She has had benefit from massage therapy for right forearm pain.     She has had neck pain which is trigger cervicogenic headaches which are now daily. Amitriptyline was previously helpful but not tolerated due to dry eye and worsening vision.    Some days are a wash of pain, I feel like I can't think. She has been more sedentary in recent months.    She has been referred to physical therapy.     Reports right knee never seems to have healed adequately from right total knee > 1 year ago. There is left thigh pain with a small nodule of anterior thigh and then numbness of anterior thigh. There has been new dysesthesias intermittently of left anterior thigh. It has been provoked by walking and prolonged standing.    Balance is fair, no falls. She does feel it is worse than in the past.    Prior information:     She notes she has always have had sinus headaches that have improved with age. She notes now she is having

## 2024-11-18 ENCOUNTER — OFFICE VISIT (OUTPATIENT)
Dept: PRIMARY CARE CLINIC | Age: 64
End: 2024-11-18
Payer: MEDICARE

## 2024-11-18 VITALS
HEART RATE: 94 BPM | RESPIRATION RATE: 98 BRPM | SYSTOLIC BLOOD PRESSURE: 132 MMHG | WEIGHT: 205.2 LBS | DIASTOLIC BLOOD PRESSURE: 84 MMHG | BODY MASS INDEX: 35.03 KG/M2 | HEIGHT: 64 IN

## 2024-11-18 DIAGNOSIS — M17.11 PRIMARY OSTEOARTHRITIS OF RIGHT KNEE: ICD-10-CM

## 2024-11-18 DIAGNOSIS — Z23 NEED FOR VACCINATION: ICD-10-CM

## 2024-11-18 DIAGNOSIS — Z00.00 WELCOME TO MEDICARE PREVENTIVE VISIT: Primary | ICD-10-CM

## 2024-11-18 DIAGNOSIS — R53.83 OTHER FATIGUE: ICD-10-CM

## 2024-11-18 DIAGNOSIS — G43.109 MIGRAINE WITH AURA AND WITHOUT STATUS MIGRAINOSUS, NOT INTRACTABLE: ICD-10-CM

## 2024-11-18 DIAGNOSIS — M54.16 LUMBAR RADICULAR PAIN: ICD-10-CM

## 2024-11-18 DIAGNOSIS — Z96.651 STATUS POST TOTAL RIGHT KNEE REPLACEMENT: ICD-10-CM

## 2024-11-18 DIAGNOSIS — G47.33 OBSTRUCTIVE SLEEP APNEA SYNDROME: ICD-10-CM

## 2024-11-18 DIAGNOSIS — R00.0 TACHYCARDIA: ICD-10-CM

## 2024-11-18 DIAGNOSIS — Z12.31 ENCOUNTER FOR SCREENING MAMMOGRAM FOR BREAST CANCER: ICD-10-CM

## 2024-11-18 DIAGNOSIS — M54.2 CERVICALGIA: ICD-10-CM

## 2024-11-18 DIAGNOSIS — K76.0 FATTY LIVER: ICD-10-CM

## 2024-11-18 DIAGNOSIS — Z13.1 SCREENING FOR DIABETES MELLITUS (DM): ICD-10-CM

## 2024-11-18 DIAGNOSIS — R53.82 CHRONIC FATIGUE: ICD-10-CM

## 2024-11-18 DIAGNOSIS — E78.00 PURE HYPERCHOLESTEROLEMIA: ICD-10-CM

## 2024-11-18 PROBLEM — H04.123 DRY EYE SYNDROME OF BILATERAL LACRIMAL GLANDS: Status: ACTIVE | Noted: 2024-07-12

## 2024-11-18 LAB
C-REACTIVE PROTEIN: 10.2 MG/L (ref 0–5)
FOLATE: 23.4 NG/ML (ref 4.8–24.2)
SED RATE, AUTOMATED: 23 MM/HR (ref 0–30)
TSH SERPL DL<=0.05 MIU/L-ACNC: 1.03 UIU/ML (ref 0.27–4.2)
VITAMIN B-12: 611 PG/ML (ref 232–1245)

## 2024-11-18 PROCEDURE — G0008 ADMIN INFLUENZA VIRUS VAC: HCPCS | Performed by: PHYSICIAN ASSISTANT

## 2024-11-18 PROCEDURE — 93000 ELECTROCARDIOGRAM COMPLETE: CPT | Performed by: PHYSICIAN ASSISTANT

## 2024-11-18 PROCEDURE — G0402 INITIAL PREVENTIVE EXAM: HCPCS | Performed by: PHYSICIAN ASSISTANT

## 2024-11-18 PROCEDURE — 3017F COLORECTAL CA SCREEN DOC REV: CPT | Performed by: PHYSICIAN ASSISTANT

## 2024-11-18 PROCEDURE — 1123F ACP DISCUSS/DSCN MKR DOCD: CPT | Performed by: PHYSICIAN ASSISTANT

## 2024-11-18 PROCEDURE — 90653 IIV ADJUVANT VACCINE IM: CPT | Performed by: PHYSICIAN ASSISTANT

## 2024-11-18 SDOH — ECONOMIC STABILITY: FOOD INSECURITY: WITHIN THE PAST 12 MONTHS, THE FOOD YOU BOUGHT JUST DIDN'T LAST AND YOU DIDN'T HAVE MONEY TO GET MORE.: NEVER TRUE

## 2024-11-18 SDOH — ECONOMIC STABILITY: FOOD INSECURITY: WITHIN THE PAST 12 MONTHS, YOU WORRIED THAT YOUR FOOD WOULD RUN OUT BEFORE YOU GOT MONEY TO BUY MORE.: NEVER TRUE

## 2024-11-18 SDOH — ECONOMIC STABILITY: INCOME INSECURITY: HOW HARD IS IT FOR YOU TO PAY FOR THE VERY BASICS LIKE FOOD, HOUSING, MEDICAL CARE, AND HEATING?: NOT HARD AT ALL

## 2024-11-18 ASSESSMENT — PATIENT HEALTH QUESTIONNAIRE - PHQ9
SUM OF ALL RESPONSES TO PHQ QUESTIONS 1-9: 0
1. LITTLE INTEREST OR PLEASURE IN DOING THINGS: NOT AT ALL
SUM OF ALL RESPONSES TO PHQ9 QUESTIONS 1 & 2: 0
2. FEELING DOWN, DEPRESSED OR HOPELESS: NOT AT ALL
SUM OF ALL RESPONSES TO PHQ QUESTIONS 1-9: 0

## 2024-11-18 ASSESSMENT — LIFESTYLE VARIABLES
HOW OFTEN DO YOU HAVE A DRINK CONTAINING ALCOHOL: MONTHLY OR LESS
HOW MANY STANDARD DRINKS CONTAINING ALCOHOL DO YOU HAVE ON A TYPICAL DAY: 1 OR 2

## 2024-11-18 NOTE — PROGRESS NOTES
Subjective:     Encounter Date:01/09/2018      Patient ID: Nessa Osuna is a 66 y.o. female.    Chief Complaint:  History of Present Illness    This is a 66-year-old with a history of hyperlipidemia, paroxysmal atrial fibrillation, who presents for follow-up.    I began following the patient when she was admitted to the hospital on 5/2017 with atrial fibrillation with rapid ventricular rate.  Patient reported at that time that she been having episodes lasting up to 2-3 hours over the last few years.  Episodes occurred about 8-9 times a year.  She underwent echocardiogram during that admission that showed normal left ventricular systolic function and wall motion with an ejection fraction of 57%, moderately to severely dilated left atrium, and no significant valvular disease.  I started on metoprolol tartrate 12.5 mg twice a day.  She is also started on rivaroxaban for her CHADS-VASc score of 2.   Follow-up she has done well with no prolonged episodes.    Today she presents for routine 6 month follow-up.  She reports that she continues to have episodes of palpitations that feel like she'll fibrillation mainly at night.  They seem to occur in clusters she'll have him nightly for about a week and then it will resolve.  Episodes can last up to a few hours and usually after a couple of hours she'll take an additional 12.5 mg of metoprolol which he was to help resolve the atrial fibrillation.  Denies any chest pain, PND orthopnea, presyncope or syncope.  She has chronic lower extremity edema that is unchanged.  She also has chronic dyspnea on exertion that has not changed in frequency or intensity.    Review of Systems   Constitution: Negative for weakness and malaise/fatigue.   HENT: Negative for hearing loss, hoarse voice, nosebleeds and sore throat.    Eyes: Negative for pain.   Cardiovascular: Positive for dyspnea on exertion, leg swelling and palpitations. Negative for chest pain, claudication, cyanosis,  Medicare Annual Wellness Visit    Jayshree Espinal is here for Medicare AWV    Assessment & Plan   Welcome to Medicare preventive visit  Need for vaccination  -     Influenza, FLUAD Trivalent, (age 65 y+), IM, Preservative Free, 0.5mL  Tachycardia  -     EKG 12 Lead  Primary osteoarthritis of right knee  -     Sudhir Pak MD, Orthopaedic Surgery, [facility]  Status post total right knee replacement  -     Sudhir Pak MD, Orthopaedic Surgery, [facility]  Fatty liver  -     Hemoglobin A1C; Future  Chronic fatigue  -     CBC with Auto Differential; Future  -     Hemoglobin A1C; Future  -     Comprehensive Metabolic Panel, Fasting; Future  Encounter for screening mammogram for breast cancer  -     RIC SURESH DIGITAL SCREEN BILATERAL; Future  Obstructive sleep apnea syndrome  Pure hypercholesterolemia  -     Comprehensive Metabolic Panel, Fasting; Future  Body mass index [BMI] 35.0-35.9, adult (Z68.35)  Screening for diabetes mellitus (DM)  -     Hemoglobin A1C; Future    Recommendations for Preventive Services Due: see orders and patient instructions/AVS.  Recommended screening schedule for the next 5-10 years is provided to the patient in written form: see Patient Instructions/AVS.     Return in about 1 month (around 12/18/2024) for Lab Results.     Subjective   The following acute and/or chronic problems were also addressed today: 7-8 episodes of fastin HR on smart watch. She is resting when the tachycardia happens. Gets some dizziness after eating. Does get HA a lot. NO CP, SOB, diaphoresis.     HR is104 today but is in pain with her knee and on low dose Elavil for pain. Recheck BP and pulse. EKG today is ok.     Try Exedrine Migraine when she gets the vision distrubance    Sleep apnea is controlled.  Machine is ok.     Trying fasting and went to a gut workshop. She did a liver cleanse first    Would like a second opinion on her right knee.  Had knee replacement with   irregular heartbeat, near-syncope, orthopnea, paroxysmal nocturnal dyspnea and syncope.   Respiratory: Negative for shortness of breath and snoring.    Endocrine: Negative for cold intolerance, heat intolerance, polydipsia, polyphagia and polyuria.   Skin: Negative for itching and rash.   Musculoskeletal: Negative for arthritis, falls, joint pain, joint swelling, muscle cramps, muscle weakness and myalgias.   Gastrointestinal: Negative for constipation, diarrhea, dysphagia, heartburn, hematemesis, hematochezia, melena, nausea and vomiting.   Genitourinary: Negative for frequency, hematuria and hesitancy.   Neurological: Negative for excessive daytime sleepiness, dizziness, headaches, light-headedness and numbness.   Psychiatric/Behavioral: Negative for depression. The patient is not nervous/anxious.           Current Outpatient Prescriptions:   •  acetaminophen (TYLENOL) 500 MG tablet, Take by mouth. Take 1 tablet every 4-6 hours as needed, Disp: , Rfl:   •  Calcium Citrate 200 MG tablet, Take by mouth., Disp: , Rfl:   •  Calcium Citrate-Vitamin D (CALCIUM + D PO), Take 1,200 mg by mouth 2 (Two) Times a Day., Disp: , Rfl:   •  Cholecalciferol (VITAMIN D) 2000 UNITS capsule, Take 1 capsule by mouth daily., Disp: , Rfl:   •  diphenhydrAMINE (BENADRYL) 25 MG tablet, Take 25 mg by mouth At Night As Needed., Disp: , Rfl:   •  DULoxetine (CYMBALTA) 60 MG capsule, Take 1 capsule by mouth Daily., Disp: 90 capsule, Rfl: 2  •  Flaxseed, Linseed, (FLAXSEED OIL) 1000 MG capsule, Take 1 capsule by mouth 2 (two) times a day., Disp: , Rfl:   •  FLUAD 0.5 ML suspension prefilled syringe, ADM 0.5ML IM UTD, Disp: , Rfl: 0  •  fluticasone (FLONASE) 50 MCG/ACT nasal spray, PLACE TWO SPRAYS IN EACH NOSTRIL ONCE DAILY, Disp: 1 each, Rfl: 5  •  gabapentin (NEURONTIN) 300 MG capsule, Take 1 capsule by mouth 2 (Two) Times a Day., Disp: 180 capsule, Rfl: 0  •  metoprolol tartrate (LOPRESSOR) 25 MG tablet, Take 0.5 tablets by mouth Every 12  "(Twelve) Hours., Disp: 90 tablet, Rfl: 2  •  rivaroxaban (XARELTO) 20 MG tablet, Take 1 tablet by mouth Daily With Dinner., Disp: 90 tablet, Rfl: 2  •  simvastatin (ZOCOR) 20 MG tablet, TAKE ONE TABLET BY MOUTH DAILY, Disp: 90 tablet, Rfl: 0    Past Medical History:   Diagnosis Date   • Breast nodule    • Chest wall mass    • Closed fracture of head of metacarpal     left second   • Closed fracture of metacarpal bone     left   • DDD (degenerative disc disease), lumbar    • Depression    • Difficulty breathing    • Fatigue    • GERD (gastroesophageal reflux disease)    • Hip pain, right    • Hyperlipidemia    • Leiomyoma of uterus    • Osteoarthritis    • Osteopenia    • Primary localized osteoarthritis of hip    • Sciatica    • Spinal stenosis      Past Surgical History:   Procedure Laterality Date   • MOHS SURGERY      chemosurgery (Mohs Micrographic Technique); Dr Jarrett and Dr Franco   • TRIANA'S NEUROMA EXCISION      single neuroma   • TONSILLECTOMY      age 4     Family History   Problem Relation Age of Onset   • Cancer Mother      bladder   • Osteoporosis Mother    • Lung cancer Father      Social History   Substance Use Topics   • Smoking status: Former Smoker     Quit date: 1997   • Smokeless tobacco: Never Used   • Alcohol use Yes      Comment: social drinker           ECG 12 Lead  Date/Time: 1/9/2018 3:34 PM  Performed by: LANA BENNETT  Authorized by: LANA BENNETT   Comparison: compared with previous ECG   Similar to previous ECG  Rhythm: sinus rhythm               Objective:         Visit Vitals   • /82   • Pulse 55   • Ht 170.2 cm (67\")   • Wt 90.7 kg (200 lb)   • BMI 31.32 kg/m2          Physical Exam   Constitutional: She is oriented to person, place, and time. She appears well-developed and well-nourished.   HENT:   Head: Normocephalic and atraumatic.   Eyes: Conjunctivae, EOM and lids are normal. Pupils are equal, round, and reactive to light.   Neck: Normal range of motion and full " passive range of motion without pain. Neck supple. No JVD present. Carotid bruit is not present.   Cardiovascular: Normal rate, regular rhythm, S1 normal and S2 normal.  Exam reveals no gallop.    No murmur heard.  Pulses:       Radial pulses are 2+ on the right side, and 2+ on the left side.   No bilateral lower extremity edema   Pulmonary/Chest: Effort normal and breath sounds normal.   Abdominal: Soft. Normal appearance.   Lymphadenopathy:     She has no cervical adenopathy.   Neurological: She is alert and oriented to person, place, and time.   Skin: Skin is warm, dry and intact.   Psychiatric: She has a normal mood and affect.       Lab Review:       Assessment:          Diagnosis Plan   1. Paroxysmal atrial fibrillation     2. Hyperlipidemia, unspecified hyperlipidemia type     3. Gastroesophageal reflux disease, esophagitis presence not specified            Plan:       1.  Paroxysmal atrial fibrillation.  Currently in sinus rhythm.  Continues to have episodes mainly at night.  I've instructed her to go ahead and take an additional 12.5 mg of metoprolol tartrate 30 minutes into an episode.  I also told her she could take an additional 12.5 mg if her symptoms continue.  We'll continue rivaroxaban for anticoagulation.  2.  Hyperlipidemia.  On statin therapy followed by Dr. Guerra.    We'll plan on following up again in 6 months.    Atrial Fibrillation and Atrial Flutter  Assessment  • The patient has paroxysmal atrial fibrillation  • This is non-valvular in etiology  • The patient's CHADS2-VASc score is 2  • A [id number] score of 2 or more is considered a high risk for a thromboembolic event  • Rivaroxaban prescribed    Plan  • Attempt to maintain sinus rhythm  • Continue rivaroxaban for antithrombotic therapy, bleeding issues discussed  • Continue beta blocker for rhythm control  • Continue beta blocker for rate control

## 2024-12-10 ENCOUNTER — OFFICE VISIT (OUTPATIENT)
Age: 64
End: 2024-12-10
Payer: MEDICARE

## 2024-12-10 VITALS — HEIGHT: 64 IN | WEIGHT: 205 LBS | BODY MASS INDEX: 35 KG/M2

## 2024-12-10 DIAGNOSIS — M25.561 RIGHT KNEE PAIN, UNSPECIFIED CHRONICITY: Primary | ICD-10-CM

## 2024-12-10 PROCEDURE — 3017F COLORECTAL CA SCREEN DOC REV: CPT | Performed by: ORTHOPAEDIC SURGERY

## 2024-12-10 PROCEDURE — 1123F ACP DISCUSS/DSCN MKR DOCD: CPT | Performed by: ORTHOPAEDIC SURGERY

## 2024-12-10 PROCEDURE — G8427 DOCREV CUR MEDS BY ELIG CLIN: HCPCS | Performed by: ORTHOPAEDIC SURGERY

## 2024-12-10 PROCEDURE — G8482 FLU IMMUNIZE ORDER/ADMIN: HCPCS | Performed by: ORTHOPAEDIC SURGERY

## 2024-12-10 PROCEDURE — G8417 CALC BMI ABV UP PARAM F/U: HCPCS | Performed by: ORTHOPAEDIC SURGERY

## 2024-12-10 PROCEDURE — 1036F TOBACCO NON-USER: CPT | Performed by: ORTHOPAEDIC SURGERY

## 2024-12-10 PROCEDURE — 99204 OFFICE O/P NEW MOD 45 MIN: CPT | Performed by: ORTHOPAEDIC SURGERY

## 2024-12-10 PROCEDURE — G8399 PT W/DXA RESULTS DOCUMENT: HCPCS | Performed by: ORTHOPAEDIC SURGERY

## 2024-12-10 PROCEDURE — 1090F PRES/ABSN URINE INCON ASSESS: CPT | Performed by: ORTHOPAEDIC SURGERY

## 2024-12-10 NOTE — PROGRESS NOTES
[facility] Orthopedics & Sports Medicine      [facility] PHYSICIANS [facility], [facility]  [facility]TS MEDICINE  [address]  [place] [address]  Dept: [phone]  Dept Fax: [phone]    Chief Compliant:  Chief Complaint   Patient presents with    Knee Pain     Right knee        History of Present Illness:  This is a 64 y.o. female who presents to the clinic today for evaluation of right knee pain.  Patient had a total knee replacement by Dr. Mujica in May 2023.  After initially improving 6 weeks postoperatively she feels like her knee is progressively gotten worse.  She feels her pain most the lateral on proximal tibia.  She describes her knee pain as a 3-10 on the pain scale with a 5 out of 10 with excessive standing or movement throughout the day.  Pain is controlled with over-the-counter Tylenol.  Imaging and laboratory testing done by Dr. Mujica has not proven anything acutely wrong with the right knee.  Discussed further testing such as allergy testing for possibility of nickel allergy as patient does react to jewelry.  Patient would also like to do physical therapy for continued strengthening of the knee.      Physical Exam:    On exam today there is no effusion on the knee she has very near full extension flexion is appropriate to 115 degrees there is no mid flexion instability patella is tracking centrally there is no increased warmth over the knee incision is well-healed she has good quadricep strength.  No significant tenderness about the knee today.    Nursing note and vitals reviewed.     Labs and Imaging: XR KNEE RIGHT (3 VIEWS)    Result Date: 12/10/2024  X-ray of the right knee show total knee arthroplasty components look to have good alignment no evidence of any loosening no acute process no bony lesions        XR taken today:  No results found.        Orders Placed This Encounter   Procedures    XR KNEE RIGHT (3 VIEWS)       Assessment and

## 2024-12-12 ENCOUNTER — TELEPHONE (OUTPATIENT)
Dept: NEUROLOGY | Age: 64
End: 2024-12-12

## 2024-12-12 NOTE — TELEPHONE ENCOUNTER
12/12/24  Patient called to request EMG order be faxed to [facility] Dept.  Faxed per patient's request  KB

## 2024-12-13 DIAGNOSIS — Z13.1 SCREENING FOR DIABETES MELLITUS (DM): ICD-10-CM

## 2024-12-13 DIAGNOSIS — E78.00 PURE HYPERCHOLESTEROLEMIA: ICD-10-CM

## 2024-12-13 DIAGNOSIS — R53.82 CHRONIC FATIGUE: ICD-10-CM

## 2024-12-13 DIAGNOSIS — K76.0 FATTY LIVER: ICD-10-CM

## 2024-12-13 LAB
ALBUMIN/GLOBULIN RATIO: 1.6 (ref 1–2.5)
ALBUMIN: 4.6 G/DL (ref 3.5–5.2)
ALP BLD-CCNC: 80 U/L (ref 35–104)
ALT SERPL-CCNC: 82 U/L (ref 10–35)
ANION GAP SERPL CALCULATED.3IONS-SCNC: 13 MMOL/L (ref 9–16)
AST SERPL-CCNC: 73 U/L (ref 10–35)
BASOPHILS ABSOLUTE: 0.06 K/UL (ref 0–0.2)
BASOPHILS RELATIVE PERCENT: 1 % (ref 0–2)
BILIRUB SERPL-MCNC: 0.5 MG/DL (ref 0–1.2)
BUN BLDV-MCNC: 14 MG/DL (ref 8–23)
CALCIUM SERPL-MCNC: 9.4 MG/DL (ref 8.6–10.4)
CHLORIDE BLD-SCNC: 105 MMOL/L (ref 98–107)
CO2: 23 MMOL/L (ref 20–31)
CREAT SERPL-MCNC: 0.7 MG/DL (ref 0.6–0.9)
EOSINOPHILS ABSOLUTE: 0.39 K/UL (ref 0–0.44)
EOSINOPHILS RELATIVE PERCENT: 5 % (ref 1–4)
ESTIMATED AVERAGE GLUCOSE: 105 MG/DL
GFR, ESTIMATED: >90 ML/MIN/1.73M2
GLUCOSE FASTING: 87 MG/DL (ref 74–99)
HBA1C MFR BLD: 5.3 % (ref 4–6)
HCT VFR BLD CALC: 44.2 % (ref 36.3–47.1)
HEMOGLOBIN: 14.3 G/DL (ref 11.9–15.1)
IMMATURE GRANULOCYTES %: 0 %
IMMATURE GRANULOCYTES ABSOLUTE: <0.03 K/UL (ref 0–0.3)
LYMPHOCYTES ABSOLUTE: 2.23 K/UL (ref 1.1–3.7)
LYMPHOCYTES RELATIVE PERCENT: 30 % (ref 24–43)
MCH RBC QN AUTO: 28.2 PG (ref 25.2–33.5)
MCHC RBC AUTO-ENTMCNC: 32.4 G/DL (ref 28.4–34.8)
MCV RBC AUTO: 87.2 FL (ref 82.6–102.9)
MONOCYTES ABSOLUTE: 0.39 K/UL (ref 0.1–1.2)
MONOCYTES RELATIVE PERCENT: 5 % (ref 3–12)
NEUTROPHILS ABSOLUTE: 4.38 K/UL (ref 1.5–8.1)
NEUTROPHILS RELATIVE PERCENT: 59 % (ref 36–65)
NRBC AUTOMATED: 0 PER 100 WBC
PDW BLD-RTO: 14.7 % (ref 11.8–14.4)
PLATELET # BLD: 362 K/UL (ref 138–453)
PMV BLD AUTO: 10.1 FL (ref 8.1–13.5)
POTASSIUM SERPL-SCNC: 4.2 MMOL/L (ref 3.7–5.3)
RBC # BLD: 5.07 M/UL (ref 3.95–5.11)
RBC # BLD: ABNORMAL 10*6/UL
SODIUM BLD-SCNC: 141 MMOL/L (ref 136–145)
TOTAL PROTEIN: 7.4 G/DL (ref 6.6–8.7)
WBC # BLD: 7.5 K/UL (ref 3.5–11.3)

## 2024-12-16 ENCOUNTER — OFFICE VISIT (OUTPATIENT)
Dept: PRIMARY CARE CLINIC | Age: 64
End: 2024-12-16
Payer: MEDICARE

## 2024-12-16 VITALS
SYSTOLIC BLOOD PRESSURE: 130 MMHG | HEIGHT: 64 IN | WEIGHT: 207 LBS | HEART RATE: 97 BPM | TEMPERATURE: 97.7 F | DIASTOLIC BLOOD PRESSURE: 86 MMHG | BODY MASS INDEX: 35.34 KG/M2 | OXYGEN SATURATION: 95 %

## 2024-12-16 DIAGNOSIS — M54.50 ACUTE RIGHT-SIDED LOW BACK PAIN WITHOUT SCIATICA: Primary | ICD-10-CM

## 2024-12-16 DIAGNOSIS — Z87.442 HISTORY OF KIDNEY STONES: ICD-10-CM

## 2024-12-16 PROCEDURE — 1123F ACP DISCUSS/DSCN MKR DOCD: CPT | Performed by: PHYSICIAN ASSISTANT

## 2024-12-16 PROCEDURE — 3017F COLORECTAL CA SCREEN DOC REV: CPT | Performed by: PHYSICIAN ASSISTANT

## 2024-12-16 PROCEDURE — G8417 CALC BMI ABV UP PARAM F/U: HCPCS | Performed by: PHYSICIAN ASSISTANT

## 2024-12-16 PROCEDURE — 1090F PRES/ABSN URINE INCON ASSESS: CPT | Performed by: PHYSICIAN ASSISTANT

## 2024-12-16 PROCEDURE — G8399 PT W/DXA RESULTS DOCUMENT: HCPCS | Performed by: PHYSICIAN ASSISTANT

## 2024-12-16 PROCEDURE — 1036F TOBACCO NON-USER: CPT | Performed by: PHYSICIAN ASSISTANT

## 2024-12-16 PROCEDURE — 99214 OFFICE O/P EST MOD 30 MIN: CPT | Performed by: PHYSICIAN ASSISTANT

## 2024-12-16 PROCEDURE — G8482 FLU IMMUNIZE ORDER/ADMIN: HCPCS | Performed by: PHYSICIAN ASSISTANT

## 2024-12-16 PROCEDURE — G8427 DOCREV CUR MEDS BY ELIG CLIN: HCPCS | Performed by: PHYSICIAN ASSISTANT

## 2024-12-16 ASSESSMENT — ENCOUNTER SYMPTOMS
RESPIRATORY NEGATIVE: 1
BACK PAIN: 1

## 2024-12-16 NOTE — PROGRESS NOTES
extension.    Neurological:      Mental Status: She is alert.         Assessment/Plan:   1. Acute right-sided low back pain without sciatica  -     Urinalysis; Future  -     XR ABDOMEN (2 VIEWS); Future  -     XR LUMBAR SPINE (MIN 4 VIEWS); Future  2. History of kidney stones  -     XR ABDOMEN (2 VIEWS); Future   Continue heat and will start NSAIDs, if urine and x-ray show no sign of stone.    Unable to give urine in office today.  Will treat accordingly to results--have today     Return if symptoms worsen or fail to improve.    Electronically signed by Kingsley Ortiz 12/16/2024 at 12:09 PM

## 2024-12-17 ENCOUNTER — HOSPITAL ENCOUNTER (OUTPATIENT)
Age: 64
Discharge: HOME | End: 2024-12-17
Payer: MEDICARE

## 2024-12-17 ENCOUNTER — HOSPITAL ENCOUNTER (OUTPATIENT)
Age: 64
Discharge: HOME | End: 2024-12-19
Payer: MEDICARE

## 2024-12-17 ENCOUNTER — HOSPITAL ENCOUNTER (OUTPATIENT)
Dept: GENERAL RADIOLOGY | Age: 64
Discharge: HOME | End: 2024-12-19
Payer: MEDICARE

## 2024-12-17 DIAGNOSIS — M54.50 ACUTE RIGHT-SIDED LOW BACK PAIN WITHOUT SCIATICA: ICD-10-CM

## 2024-12-17 DIAGNOSIS — Z87.442 HISTORY OF KIDNEY STONES: ICD-10-CM

## 2024-12-17 LAB
BACTERIA URNS QL MICRO: NORMAL
BILIRUB UR QL STRIP: NEGATIVE
CASTS #/AREA URNS LPF: NORMAL /LPF (ref 0–8)
CLARITY UR: ABNORMAL
COLOR UR: ABNORMAL
EPI CELLS #/AREA URNS HPF: NORMAL /HPF (ref 0–5)
GLUCOSE UR STRIP-MCNC: NEGATIVE MG/DL
HGB UR QL STRIP.AUTO: NEGATIVE
KETONES UR STRIP-MCNC: NEGATIVE MG/DL
LEUKOCYTE ESTERASE UR QL STRIP: NEGATIVE
NITRITE UR QL STRIP: NEGATIVE
PH UR STRIP: 6.5 [PH] (ref 5–8)
PROT UR STRIP-MCNC: NEGATIVE MG/DL
RBC #/AREA URNS HPF: NORMAL /HPF (ref 0–4)
SP GR UR STRIP: 1.02 (ref 1–1.03)
UROBILINOGEN UR STRIP-ACNC: NORMAL EU/DL (ref 0–1)
WBC #/AREA URNS HPF: NORMAL /HPF (ref 0–5)

## 2024-12-17 PROCEDURE — 74019 RADEX ABDOMEN 2 VIEWS: CPT

## 2024-12-17 PROCEDURE — 81001 URINALYSIS AUTO W/SCOPE: CPT

## 2024-12-17 PROCEDURE — 72100 X-RAY EXAM L-S SPINE 2/3 VWS: CPT

## 2024-12-29 NOTE — PROGRESS NOTES
[facility]  [facility] PRIMARY CARE  [address]  [facility] [place] [address]  Dept: [phone]    Jayshree Espinal is a 64 y.o. female who presents today for her medical conditions/complaints as noted below.      Chief Complaint   Patient presents with    Follow-up     Patient is here to review labs - labs complete     Cough     Patient states she had a cough since the 13th        HPI:     HPI  Lumbar spine: multilevel degenerative change, most significant L4-5 and L5-S1. Back pain: improved after seeing upper cervical chiropractor. She did have a rib out of place and that helped instantly. Did some PT for back as well    LFTS are elevated. Has MASH and follows with GI. ETOH use:on occ when out with the girls---6 times a year--one drink    Tylenol use: 3000mg a day for knee   BMI is 35.5    Cough:  started Dec 13.  Did lose voice for 4-5 days. Blowing nose a lot and a productive cough. NO fever or SOB or wheezing.      BP and pulse are elevated today.  No CP, SOB.   No components found for: \"LDLCHOLESTEROL\", \"LDLCALC\"    (goal LDL is <100)   AST (U/L)   Date Value   12/13/2024 73 (H)     ALT (U/L)   Date Value   12/13/2024 82 (H)     BUN (mg/dL)   Date Value   12/13/2024 14     BP Readings from Last 3 Encounters:   12/31/24 (!) 140/90   12/16/24 130/86   11/18/24 132/84          (goal 120/80)    Past Medical History:   Diagnosis Date    Arthritis     Decreased vision of right eye     Dental bridge present     permanent left upper    Deviated septum     Dry skin     GERD (gastroesophageal reflux disease)     History of chest pain     past,stress test negative,poss acid reflux    Kidney stones     Migraine     Sinus infection 01/04/2018    on antibiotics x 10 days,better no cough or fever    Sleep apnea     USES CPAP MACHINE NIGHTLY    Snores     Tingling     Urgency of urination     occas      Past Surgical History:   Procedure Laterality Date    CATARACT REMOVAL Bilateral     COLONOSCOPY

## 2024-12-31 ENCOUNTER — OFFICE VISIT (OUTPATIENT)
Dept: PRIMARY CARE CLINIC | Age: 64
End: 2024-12-31
Payer: MEDICARE

## 2024-12-31 VITALS
DIASTOLIC BLOOD PRESSURE: 82 MMHG | SYSTOLIC BLOOD PRESSURE: 120 MMHG | BODY MASS INDEX: 34.66 KG/M2 | OXYGEN SATURATION: 98 % | HEART RATE: 99 BPM | TEMPERATURE: 97 F | WEIGHT: 203 LBS | HEIGHT: 64 IN

## 2024-12-31 DIAGNOSIS — M47.816 SPONDYLOSIS OF LUMBAR REGION WITHOUT MYELOPATHY OR RADICULOPATHY: ICD-10-CM

## 2024-12-31 DIAGNOSIS — R05.1 ACUTE COUGH: ICD-10-CM

## 2024-12-31 DIAGNOSIS — J01.90 ACUTE BACTERIAL SINUSITIS: ICD-10-CM

## 2024-12-31 DIAGNOSIS — B96.89 ACUTE BACTERIAL SINUSITIS: ICD-10-CM

## 2024-12-31 DIAGNOSIS — G89.29 CHRONIC KNEE PAIN, UNSPECIFIED LATERALITY: ICD-10-CM

## 2024-12-31 DIAGNOSIS — M25.569 CHRONIC KNEE PAIN, UNSPECIFIED LATERALITY: ICD-10-CM

## 2024-12-31 DIAGNOSIS — K76.0 FATTY LIVER: Primary | ICD-10-CM

## 2024-12-31 PROBLEM — K22.70 BARRETT'S ESOPHAGUS WITHOUT DYSPLASIA: Status: ACTIVE | Noted: 2024-12-31

## 2024-12-31 PROCEDURE — 3017F COLORECTAL CA SCREEN DOC REV: CPT | Performed by: PHYSICIAN ASSISTANT

## 2024-12-31 PROCEDURE — G8417 CALC BMI ABV UP PARAM F/U: HCPCS | Performed by: PHYSICIAN ASSISTANT

## 2024-12-31 PROCEDURE — G8482 FLU IMMUNIZE ORDER/ADMIN: HCPCS | Performed by: PHYSICIAN ASSISTANT

## 2024-12-31 PROCEDURE — 1090F PRES/ABSN URINE INCON ASSESS: CPT | Performed by: PHYSICIAN ASSISTANT

## 2024-12-31 PROCEDURE — 99214 OFFICE O/P EST MOD 30 MIN: CPT | Performed by: PHYSICIAN ASSISTANT

## 2024-12-31 PROCEDURE — G8427 DOCREV CUR MEDS BY ELIG CLIN: HCPCS | Performed by: PHYSICIAN ASSISTANT

## 2024-12-31 PROCEDURE — 1036F TOBACCO NON-USER: CPT | Performed by: PHYSICIAN ASSISTANT

## 2024-12-31 PROCEDURE — G8399 PT W/DXA RESULTS DOCUMENT: HCPCS | Performed by: PHYSICIAN ASSISTANT

## 2024-12-31 PROCEDURE — 1123F ACP DISCUSS/DSCN MKR DOCD: CPT | Performed by: PHYSICIAN ASSISTANT

## 2024-12-31 RX ORDER — BENZONATATE 100 MG/1
100-200 CAPSULE ORAL 3 TIMES DAILY PRN
Qty: 60 CAPSULE | Refills: 0 | Status: SHIPPED | OUTPATIENT
Start: 2024-12-31 | End: 2025-01-07

## 2024-12-31 RX ORDER — AZITHROMYCIN 250 MG/1
TABLET, FILM COATED ORAL
Qty: 1 PACKET | Refills: 0 | Status: SHIPPED | OUTPATIENT
Start: 2024-12-31 | End: 2025-01-10

## 2024-12-31 RX ORDER — FLUCONAZOLE 150 MG/1
TABLET ORAL
Qty: 2 TABLET | Refills: 0 | Status: SHIPPED | OUTPATIENT
Start: 2024-12-31

## 2024-12-31 ASSESSMENT — ENCOUNTER SYMPTOMS
SINUS PRESSURE: 1
COUGH: 1
SORE THROAT: 0
SHORTNESS OF BREATH: 0
WHEEZING: 0

## 2025-01-20 DIAGNOSIS — M79.2 NERVE PAIN: ICD-10-CM

## 2025-01-20 RX ORDER — PREGABALIN 50 MG/1
50 CAPSULE ORAL 3 TIMES DAILY
Qty: 270 CAPSULE | Refills: 1 | Status: SHIPPED | OUTPATIENT
Start: 2025-01-20 | End: 2025-07-19

## 2025-01-20 NOTE — TELEPHONE ENCOUNTER
Pharmacy requesting refill of Lyrica 50 mg capsules.    Medication active on med list yes    Date of last Rx: 6/3/2024 #270 with 1 refills   verified by PAULA Taylor    Date of last appointment 11/12/2024    Next Visit Date:  Visit date not found

## 2025-02-06 ENCOUNTER — HOSPITAL ENCOUNTER (OUTPATIENT)
Dept: MAMMOGRAPHY | Age: 65
Discharge: HOME | End: 2025-02-08
Payer: MEDICARE

## 2025-02-06 VITALS — BODY MASS INDEX: 35.19 KG/M2 | WEIGHT: 205 LBS

## 2025-02-06 DIAGNOSIS — Z12.31 ENCOUNTER FOR SCREENING MAMMOGRAM FOR BREAST CANCER: ICD-10-CM

## 2025-02-06 PROCEDURE — 77063 BREAST TOMOSYNTHESIS BI: CPT

## 2025-02-11 ENCOUNTER — OFFICE VISIT (OUTPATIENT)
Dept: NEUROLOGY | Age: 65
End: 2025-02-11

## 2025-02-11 VITALS
HEIGHT: 64 IN | SYSTOLIC BLOOD PRESSURE: 127 MMHG | BODY MASS INDEX: 35.78 KG/M2 | HEART RATE: 92 BPM | DIASTOLIC BLOOD PRESSURE: 82 MMHG | WEIGHT: 209.6 LBS

## 2025-02-11 DIAGNOSIS — R20.0 ANESTHESIA OF SKIN: ICD-10-CM

## 2025-02-11 DIAGNOSIS — M79.2 NERVE PAIN: ICD-10-CM

## 2025-02-11 DIAGNOSIS — G43.109 MIGRAINE WITH AURA AND WITHOUT STATUS MIGRAINOSUS, NOT INTRACTABLE: Primary | ICD-10-CM

## 2025-02-11 DIAGNOSIS — M77.11 LATERAL EPICONDYLITIS OF RIGHT ELBOW: ICD-10-CM

## 2025-02-11 DIAGNOSIS — R79.89 ELEVATED LFTS: ICD-10-CM

## 2025-02-11 DIAGNOSIS — G57.12 MERALGIA PARESTHETICA OF LEFT SIDE: ICD-10-CM

## 2025-02-11 RX ORDER — PREGABALIN 50 MG/1
50 CAPSULE ORAL 3 TIMES DAILY
Qty: 270 CAPSULE | Refills: 1 | Status: SHIPPED | OUTPATIENT
Start: 2025-02-11 | End: 2025-08-10

## 2025-02-11 NOTE — PROGRESS NOTES
[address] [facility] [address]  Dept: [phone]    PATIENT NAME: Jayshree Espinal  PATIENT MRN: [id number]  PRIMARY CARE PHYSICIAN: Whitney Cerrato PA-C    HPI:      Jayshree Espinal is a 64 y.o. female who presents to clinic today regarding history of migraine and new issue of right arm pain and weakness.    At last appointment we increased amitryptinline to 20 mg for migraine; continue Pregablin 50 mg TID for neuropathic pain; referred to PT for lumbar radiculopathy pain and lateral epicondylitis. She remains in PT.    Reports severe headaches are resolved with increased Amitriptyline with sparse mild headaches. Her back pain is also improved. Epicondylitis is improving, not resolved. Remains active with PT and massage therapy.     She notes that mid movement she noted buzzing of both feet when laying at night. Mainly noted on the soles of both feet. There is no burning pain. Balance is stable. IF she is more active during the day, her feet feel worse at night.     EMG NCS bilat lower ext Jan 2024: normal with no evidence of neuropathy, plexopathy, radiculopathy, myopathy.      Prior information:       Reports that starting in August she has developed right elbow/ forearm pain described as muscle aching. It seems worse in the evening and at night. Pain can wake her from sleep. There is associated weakness of the arm. Not provoked by certain activities. There is no shoulder pain. No radiation of pain from neck down shoulder/ arms. She has noticed that her arm is more painful when she has neck pain. She feels numbness if right thumb, index finger and itching sensation of palm. No numbness extending up her forearm.  No left hand numbness. She has had benefit from massage therapy for right forearm pain.     She has had neck pain which is trigger cervicogenic headaches which are now daily. Amitriptyline was previously helpful but not tolerated due to dry eye and worsening vision.    Reports right

## 2025-02-12 ENCOUNTER — PATIENT MESSAGE (OUTPATIENT)
Dept: GASTROENTEROLOGY | Age: 65
End: 2025-02-12

## 2025-02-12 DIAGNOSIS — R20.0 ANESTHESIA OF SKIN: ICD-10-CM

## 2025-02-12 DIAGNOSIS — R79.89 ELEVATED LFTS: ICD-10-CM

## 2025-02-12 LAB — HEPATITIS C ANTIBODY: NONREACTIVE

## 2025-02-13 ENCOUNTER — OFFICE VISIT (OUTPATIENT)
Dept: GASTROENTEROLOGY | Age: 65
End: 2025-02-13
Payer: MEDICARE

## 2025-02-13 VITALS
RESPIRATION RATE: 19 BRPM | BODY MASS INDEX: 34.83 KG/M2 | HEIGHT: 64 IN | HEART RATE: 106 BPM | TEMPERATURE: 97.8 F | DIASTOLIC BLOOD PRESSURE: 87 MMHG | SYSTOLIC BLOOD PRESSURE: 136 MMHG | WEIGHT: 204 LBS

## 2025-02-13 DIAGNOSIS — K21.9 GASTROESOPHAGEAL REFLUX DISEASE WITHOUT ESOPHAGITIS: Primary | ICD-10-CM

## 2025-02-13 DIAGNOSIS — K75.81 METABOLIC DYSFUNCTION-ASSOCIATED STEATOHEPATITIS (MASH): ICD-10-CM

## 2025-02-13 DIAGNOSIS — K22.70 BARRETT'S ESOPHAGUS WITHOUT DYSPLASIA: ICD-10-CM

## 2025-02-13 PROCEDURE — G2211 COMPLEX E/M VISIT ADD ON: HCPCS | Performed by: INTERNAL MEDICINE

## 2025-02-13 PROCEDURE — 1090F PRES/ABSN URINE INCON ASSESS: CPT | Performed by: INTERNAL MEDICINE

## 2025-02-13 PROCEDURE — 3017F COLORECTAL CA SCREEN DOC REV: CPT | Performed by: INTERNAL MEDICINE

## 2025-02-13 PROCEDURE — 1036F TOBACCO NON-USER: CPT | Performed by: INTERNAL MEDICINE

## 2025-02-13 PROCEDURE — G8427 DOCREV CUR MEDS BY ELIG CLIN: HCPCS | Performed by: INTERNAL MEDICINE

## 2025-02-13 PROCEDURE — 99214 OFFICE O/P EST MOD 30 MIN: CPT | Performed by: INTERNAL MEDICINE

## 2025-02-13 PROCEDURE — G8417 CALC BMI ABV UP PARAM F/U: HCPCS | Performed by: INTERNAL MEDICINE

## 2025-02-13 PROCEDURE — G8399 PT W/DXA RESULTS DOCUMENT: HCPCS | Performed by: INTERNAL MEDICINE

## 2025-02-13 PROCEDURE — 1123F ACP DISCUSS/DSCN MKR DOCD: CPT | Performed by: INTERNAL MEDICINE

## 2025-02-13 RX ORDER — DEXLANSOPRAZOLE 30 MG/1
30 CAPSULE, DELAYED RELEASE ORAL
Qty: 180 CAPSULE | Refills: 1 | Status: SHIPPED | OUTPATIENT
Start: 2025-02-13 | End: 2025-02-13

## 2025-02-13 RX ORDER — DEXLANSOPRAZOLE 30 MG/1
30 CAPSULE, DELAYED RELEASE ORAL
Qty: 180 CAPSULE | Refills: 1 | Status: SHIPPED | OUTPATIENT
Start: 2025-02-13 | End: 2025-05-14

## 2025-02-13 ASSESSMENT — ENCOUNTER SYMPTOMS
NAUSEA: 0
VOMITING: 0
COUGH: 1
CHOKING: 0
RECTAL PAIN: 0
ABDOMINAL DISTENTION: 1
CONSTIPATION: 0
TROUBLE SWALLOWING: 1
ANAL BLEEDING: 0
ABDOMINAL PAIN: 1
SORE THROAT: 0
BLOOD IN STOOL: 0
DIARRHEA: 0
COLOR CHANGE: 0
WHEEZING: 1

## 2025-02-13 NOTE — PROGRESS NOTES
day     Minutes of Exercise per Session: 30 min   Stress: Not on file   Social Connections: Not on file   Intimate Partner Violence: Not on file   Housing Stability: Unknown (11/18/2024)    Housing Stability Vital Sign     Unable to Pay for Housing in the Last Year: Not on file     Number of Times Moved in the Last Year: Not on file     Homeless in the Last Year: No         REVIEW OF SYSTEMS: A 12-point review of systems was obtained and pertinent positives and negatives were listed below.     REVIEW OF SYSTEMS:     Constitutional: No fever, no chills, no lethargy, no weakness.  HEENT:  No headache, otalgia, itchy eyes, nasal discharge or sore throat.  Cardiac:  No chest pain, dyspnea, orthopnea or PND.  Chest:   No cough, phlegm or wheezing.  Abdomen:      Detailed by MA   Neuro:  No focal weakness, abnormal movements or seizure like activity.  Skin:   No rashes, no itching.  :   No hematuria, no pyuria, no dysuria, no flank pain.  Extremities:  No swelling or joint pains.  ROS was otherwise negative    Review of Systems   Constitutional:  Positive for unexpected weight change (Weight Gain). Negative for appetite change and fatigue.   HENT:  Positive for trouble swallowing (occasionally with dry foods). Negative for sore throat.    Respiratory:  Positive for cough and wheezing. Negative for choking.    Cardiovascular:  Negative for chest pain, palpitations and leg swelling.   Gastrointestinal:  Positive for abdominal distention (Constant) and abdominal pain (Constant Epigastric Discomfort, Eating makes it feel better/Better since the last visit). Negative for anal bleeding, blood in stool, constipation, diarrhea, nausea, rectal pain and vomiting.   Skin:  Negative for color change.   Allergic/Immunologic: Positive for food allergies.   Neurological:  Positive for dizziness and headaches. Negative for light-headedness.   Hematological:  Bruises/bleeds easily.       PHYSICAL EXAMINATION: Vital signs reviewed per

## 2025-02-14 LAB
ALBUMIN (CALCULATED): 4.4 G/DL (ref 3.2–5.2)
ALBUMIN PERCENT: 61 % (ref 56–66)
ALPHA 1 PERCENT: 4 % (ref 3–5)
ALPHA 2 PERCENT: 10 % (ref 7–12)
ALPHA-1-GLOBULIN: 0.3 G/DL (ref 0.1–0.4)
ALPHA-2-GLOBULIN: 0.7 G/DL (ref 0.5–0.9)
ANTI SSA: 0.4 U/ML
ANTI SSB: <0.3 U/ML
BETA GLOBULIN: 1 G/DL (ref 0.7–1.4)
BETA PERCENT: 14 % (ref 8–13)
GAMMA GLOBULIN %: 12 % (ref 11–19)
GAMMA GLOBULIN: 0.8 G/DL (ref 0.5–1.5)
PATHOLOGIST: ABNORMAL
PROTEIN ELECTROPHORESIS, SERUM: ABNORMAL
TOTAL PROT. SUM,%: 101 % (ref 98–102)
TOTAL PROT. SUM: 7.2 G/DL (ref 6.3–8.2)
TOTAL PROTEIN: 7.2 G/DL (ref 6.6–8.7)

## 2025-02-15 LAB
VITAMIN B1 WHOLE BLOOD: 137 NMOL/L (ref 70–180)
VITAMIN B6: 92.2 NMOL/L (ref 20–125)

## 2025-04-26 RX ORDER — LANSOPRAZOLE 30 MG/1
30 CAPSULE, DELAYED RELEASE ORAL DAILY
Qty: 90 CAPSULE | Refills: 3 | Status: SHIPPED | OUTPATIENT
Start: 2025-04-26

## 2025-05-01 ENCOUNTER — TELEPHONE (OUTPATIENT)
Dept: PRIMARY CARE CLINIC | Age: 65
End: 2025-05-01

## 2025-05-01 NOTE — TELEPHONE ENCOUNTER
Writer was not able to lvm asking for a return call to schedule AWV with Yolande Cerrato PA-C after 11/19/2025

## 2025-05-13 ENCOUNTER — OFFICE VISIT (OUTPATIENT)
Dept: NEUROLOGY | Age: 65
End: 2025-05-13
Payer: MEDICARE

## 2025-05-13 VITALS
BODY MASS INDEX: 35.24 KG/M2 | DIASTOLIC BLOOD PRESSURE: 97 MMHG | SYSTOLIC BLOOD PRESSURE: 140 MMHG | HEIGHT: 64 IN | HEART RATE: 86 BPM | WEIGHT: 206.4 LBS

## 2025-05-13 DIAGNOSIS — M77.11 LATERAL EPICONDYLITIS OF RIGHT ELBOW: ICD-10-CM

## 2025-05-13 DIAGNOSIS — G57.12 MERALGIA PARESTHETICA OF LEFT SIDE: ICD-10-CM

## 2025-05-13 DIAGNOSIS — G43.109 MIGRAINE WITH AURA AND WITHOUT STATUS MIGRAINOSUS, NOT INTRACTABLE: Primary | ICD-10-CM

## 2025-05-13 PROCEDURE — G8427 DOCREV CUR MEDS BY ELIG CLIN: HCPCS | Performed by: PHYSICIAN ASSISTANT

## 2025-05-13 PROCEDURE — 1123F ACP DISCUSS/DSCN MKR DOCD: CPT | Performed by: PHYSICIAN ASSISTANT

## 2025-05-13 PROCEDURE — 1159F MED LIST DOCD IN RCRD: CPT | Performed by: PHYSICIAN ASSISTANT

## 2025-05-13 PROCEDURE — 3017F COLORECTAL CA SCREEN DOC REV: CPT | Performed by: PHYSICIAN ASSISTANT

## 2025-05-13 PROCEDURE — 1090F PRES/ABSN URINE INCON ASSESS: CPT | Performed by: PHYSICIAN ASSISTANT

## 2025-05-13 PROCEDURE — G8399 PT W/DXA RESULTS DOCUMENT: HCPCS | Performed by: PHYSICIAN ASSISTANT

## 2025-05-13 PROCEDURE — 1036F TOBACCO NON-USER: CPT | Performed by: PHYSICIAN ASSISTANT

## 2025-05-13 PROCEDURE — G8417 CALC BMI ABV UP PARAM F/U: HCPCS | Performed by: PHYSICIAN ASSISTANT

## 2025-05-13 PROCEDURE — 99214 OFFICE O/P EST MOD 30 MIN: CPT | Performed by: PHYSICIAN ASSISTANT

## 2025-05-13 RX ORDER — AMITRIPTYLINE HYDROCHLORIDE 10 MG/1
20 TABLET ORAL NIGHTLY
Qty: 60 TABLET | Refills: 3 | Status: SHIPPED | OUTPATIENT
Start: 2025-05-13

## 2025-05-13 RX ORDER — SUMATRIPTAN 50 MG/1
50 TABLET, FILM COATED ORAL
Qty: 12 TABLET | Refills: 3 | Status: SHIPPED | OUTPATIENT
Start: 2025-05-13

## 2025-05-13 RX ORDER — MONTELUKAST SODIUM 10 MG/1
10 TABLET ORAL NIGHTLY
Qty: 90 TABLET | Refills: 3 | Status: SHIPPED | OUTPATIENT
Start: 2025-05-13

## 2025-05-13 NOTE — PROGRESS NOTES
[address] [facility] [address]  Dept: [phone]    PATIENT NAME: Jayshree Espinal  PATIENT MRN: [id number]  PRIMARY CARE PHYSICIAN: Whitney Cerrato PA-C    HPI:      Jayshree Espinal is a 65 y.o. female who presents to clinic today regarding migraine.     At last appointment we continued amitriptyline to 20 mg for migraine; continue Pregablin 50 mg TID for neuropathic pain.    Left leg/ back symptoms are improved with PT and massage therapy.     She has had some return of headaches in the last month. She has just been taking 10 mg amitriptyline at night. They occur once weekly and can last an entire day.     Prior information:     Reports that starting in August 2024 she has developed right elbow/ forearm pain described as muscle aching. It seems worse in the evening and at night. Pain can wake her from sleep. There is associated weakness of the arm. Not provoked by certain activities. There is no shoulder pain. No radiation of pain from neck down shoulder/ arms. She has noticed that her arm is more painful when she has neck pain. She feels numbness if right thumb, index finger and itching sensation of palm. No numbness extending up her forearm.  No left hand numbness. She has had benefit from massage therapy for right forearm pain.     She has had neck pain which is trigger cervicogenic headaches which are now daily. Amitriptyline was previously helpful but not tolerated due to dry eye and worsening vision.    Reports right knee never seems to have healed adequately from right total knee > 1 year ago. There is left thigh pain with a small nodule of anterior thigh and then numbness of anterior thigh. There has been new dysesthesias intermittently of left anterior thigh. It has been provoked by walking and prolonged standing.    Past headache description:  Patient describes the quality of pain as throbbing and pressure throbbing which varies in intensity 6/10.Associated symptoms include

## 2025-07-03 RX ORDER — AMITRIPTYLINE HYDROCHLORIDE 10 MG/1
20 TABLET ORAL NIGHTLY
Qty: 180 TABLET | Refills: 1 | Status: SHIPPED | OUTPATIENT
Start: 2025-07-03

## 2025-07-03 NOTE — TELEPHONE ENCOUNTER
Patient requesting a pharmacy change for this script to mail order with 90 day supply.    Patient calling for refill of Elavil 10mg.      Medication active on med list yes      Date of last fill: 5/13/25 #60 R-3  verified on 7/3/2025   verified by VS LPN      Date of last appointment 5/13/25    Next Visit Date:  Visit date not found

## 2025-07-22 DIAGNOSIS — K21.9 GASTROESOPHAGEAL REFLUX DISEASE, UNSPECIFIED WHETHER ESOPHAGITIS PRESENT: ICD-10-CM

## 2025-07-22 DIAGNOSIS — K22.70 BARRETT'S ESOPHAGUS WITHOUT DYSPLASIA: Primary | ICD-10-CM

## 2025-07-22 RX ORDER — DEXLANSOPRAZOLE 30 MG/1
30 CAPSULE, DELAYED RELEASE ORAL
Qty: 180 CAPSULE | Refills: 0 | Status: SHIPPED | OUTPATIENT
Start: 2025-07-22 | End: 2025-10-20

## 2025-07-22 NOTE — TELEPHONE ENCOUNTER
Patient called today and left a VM at 231 pm requesting a refill with Dexilant and wanting the Rx to go to [facility]. Writer called patient back in regards to this. Patient noted she is going out of town to [place] and would like the Refill Rx to be sent to [facility]. Writer noted will send over electronically. Patient thanked writer. Please advise

## 2025-08-26 ENCOUNTER — TELEPHONE (OUTPATIENT)
Dept: GASTROENTEROLOGY | Age: 65
End: 2025-08-26

## (undated) DEVICE — GLOVE SURG SZ 7 CRM LTX FREE POLYISOPRENE POLYMER BEAD ANTI

## (undated) DEVICE — 23 GA VALVED TROCAR CANNULA ENTRY SYSTEM, 1 CT: Brand: ALCON

## (undated) DEVICE — BITEBLOCK 54FR W/ DENT RIM BLOX

## (undated) DEVICE — 6 ML SYRINGE LUER-LOCK TIP: Brand: MONOJECT

## (undated) DEVICE — PREP SOL PVP IODINE 4%  4 OZ/BTL

## (undated) DEVICE — KIT CLN UP LIN W/ STD SAHARA TBL SHT 40X60IN DRAW/LIFT SHT

## (undated) DEVICE — GLOVE ORANGE PI 7 1/2   MSG9075

## (undated) DEVICE — STANDARD HYPODERMIC NEEDLE,ALUMINUM HUB: Brand: MONOJECT

## (undated) DEVICE — CONSTELLATION VISION SYSTEM 5000 CPM ULTRAVIT PROBE STRAIGHT ENDOILLUMINATOR 23 GA TOTALPLUS VITRECTOMY PAK ENGAUGE RFID: Brand: CONSTELLATION, ULTRAVIT, TOTALPLUS, ENGAUGE

## (undated) DEVICE — REVOLUTION DSP 23G ILM FORCEPS: Brand: ALCON GRIESHABER REVOLUTION

## (undated) DEVICE — SOLUTION IRRIGATION BAL SALT SOLUTION 500 ML BTL 6/CA BSS +

## (undated) DEVICE — 1 EACH 40411 STERILE DISPOSABLE SUPER VIEW® LENS SET & 1 EACH 40100 STERILE MICROSCOPE DRAPE: Brand: SUPER VIEW® PACK

## (undated) DEVICE — FORCEPS BX L240CM WRK CHN 2.8MM STD CAP W/ NDL MIC MESH

## (undated) DEVICE — AGENT VISCOELASTIC 1ML 2% HYDROXYPROPYL METHCELL PRELD GLS

## (undated) DEVICE — 3M™ TEGADERM™ TRANSPARENT FILM DRESSING FRAME STYLE, 1624W, 2-3/8 IN X 2-3/4 IN (6 CM X 7 CM), 100/CT 4CT/CASE: Brand: 3M™ TEGADERM™

## (undated) DEVICE — CORD,CAUTERY,BIPOLAR,STERILE: Brand: MEDLINE

## (undated) DEVICE — MICROSURGICAL INSTRUMENT 23GA SOFT TIP CANNULA, DSP, 0.8MM: Brand: ALCON

## (undated) DEVICE — DEFENDO AIR WATER SUCTION AND BIOPSY VALVE KIT FOR  OLYMPUS: Brand: DEFENDO AIR/WATER/SUCTION AND BIOPSY VALVE

## (undated) DEVICE — NEEDLE HYPO 30GA L0.5IN BGE POLYPR HUB S STL REG BVL STR

## (undated) DEVICE — 25+® WIDE ANGLE ENDOILLUMINATOR: Brand: ENGAUGE

## (undated) DEVICE — 1 ML TUBERCULIN SYRINGE LUER-LOCK TIP: Brand: MONOJECT

## (undated) DEVICE — ENDO KIT W/SYRINGE: Brand: MEDLINE INDUSTRIES, INC.

## (undated) DEVICE — 23GA TANO DIAMOND DUSTED MEMBRANE SCRAPER (DDMS™): Brand: SYNERGETICS

## (undated) DEVICE — GOWN,AURORA,NONREINFORCED,LARGE: Brand: MEDLINE

## (undated) DEVICE — CONTAINER,SPECIMEN,OR STERILE,4OZ: Brand: MEDLINE

## (undated) DEVICE — RETINA PK

## (undated) DEVICE — HYPODERMIC SAFETY NEEDLE: Brand: MAGELLAN

## (undated) DEVICE — SYRINGE, LUER LOCK, 10ML: Brand: MEDLINE

## (undated) DEVICE — PEN: MARKING STD 100/CS: Brand: MEDICAL ACTION INDUSTRIES

## (undated) DEVICE — 3 ML SYRINGE LUER-LOCK TIP: Brand: MONOJECT

## (undated) DEVICE — SOLUTION IRRIG 1000ML PENTALYTE PLAS POUR BTL TIS U SOL

## (undated) DEVICE — GLOVE ORANGE PI 7   MSG9070

## (undated) DEVICE — NEEDLE FLTR 18GA L1.5IN MEM THK5UM BLNT DISP

## (undated) DEVICE — SOLUTION SURG PREP POV IOD 7.5% 4 OZ

## (undated) DEVICE — LUER-LOK 360°: Brand: CONNECTA, LUER-LOK